# Patient Record
Sex: FEMALE | Race: OTHER | Employment: OTHER | URBAN - METROPOLITAN AREA
[De-identification: names, ages, dates, MRNs, and addresses within clinical notes are randomized per-mention and may not be internally consistent; named-entity substitution may affect disease eponyms.]

---

## 2017-08-17 ENCOUNTER — APPOINTMENT (OUTPATIENT)
Dept: GENERAL RADIOLOGY | Facility: HOSPITAL | Age: 62
End: 2017-08-17
Attending: EMERGENCY MEDICINE
Payer: COMMERCIAL

## 2017-08-17 ENCOUNTER — HOSPITAL ENCOUNTER (OUTPATIENT)
Facility: HOSPITAL | Age: 62
Setting detail: OBSERVATION
Discharge: HOME | End: 2017-08-18
Attending: EMERGENCY MEDICINE | Admitting: HOSPITALIST
Payer: COMMERCIAL

## 2017-08-17 ENCOUNTER — APPOINTMENT (OUTPATIENT)
Dept: CT IMAGING | Facility: HOSPITAL | Age: 62
End: 2017-08-17
Attending: EMERGENCY MEDICINE
Payer: COMMERCIAL

## 2017-08-17 DIAGNOSIS — R42 DIZZINESS: ICD-10-CM

## 2017-08-17 DIAGNOSIS — R20.0 LEFT SIDED NUMBNESS: Primary | ICD-10-CM

## 2017-08-17 DIAGNOSIS — I63.311 CEREBROVASCULAR ACCIDENT (CVA) DUE TO THROMBOSIS OF RIGHT MIDDLE CEREBRAL ARTERY (HCC): ICD-10-CM

## 2017-08-17 LAB
ALBUMIN SERPL BCP-MCNC: 3.5 G/DL (ref 3.5–4.8)
ALBUMIN/GLOB SERPL: 1.1 {RATIO} (ref 1–2)
ALP SERPL-CCNC: 69 U/L (ref 32–100)
ALT SERPL-CCNC: 19 U/L (ref 14–54)
ANION GAP SERPL CALC-SCNC: 6 MMOL/L (ref 0–18)
ANION GAP SERPL CALC-SCNC: 8 MMOL/L (ref 0–18)
AST SERPL-CCNC: 21 U/L (ref 15–41)
BASOPHILS # BLD: 0.1 K/UL (ref 0–0.2)
BASOPHILS NFR BLD: 1 %
BILIRUB SERPL-MCNC: 0.6 MG/DL (ref 0.3–1.2)
BILIRUB UR QL: NEGATIVE
BUN SERPL-MCNC: 13 MG/DL (ref 8–20)
BUN SERPL-MCNC: 14 MG/DL (ref 8–20)
BUN/CREAT SERPL: 23.7 (ref 10–20)
BUN/CREAT SERPL: 25 (ref 10–20)
CALCIUM SERPL-MCNC: 9.4 MG/DL (ref 8.5–10.5)
CALCIUM SERPL-MCNC: 9.5 MG/DL (ref 8.5–10.5)
CHLORIDE SERPL-SCNC: 103 MMOL/L (ref 95–110)
CHLORIDE SERPL-SCNC: 106 MMOL/L (ref 95–110)
CHOLEST SERPL-MCNC: 178 MG/DL (ref 110–200)
CLARITY UR: CLEAR
CO2 SERPL-SCNC: 28 MMOL/L (ref 22–32)
CO2 SERPL-SCNC: 28 MMOL/L (ref 22–32)
COLOR UR: YELLOW
CREAT SERPL-MCNC: 0.52 MG/DL (ref 0.5–1.5)
CREAT SERPL-MCNC: 0.59 MG/DL (ref 0.5–1.5)
EOSINOPHIL # BLD: 0.3 K/UL (ref 0–0.7)
EOSINOPHIL NFR BLD: 5 %
ERYTHROCYTE [DISTWIDTH] IN BLOOD BY AUTOMATED COUNT: 13.4 % (ref 11–15)
ERYTHROCYTE [SEDIMENTATION RATE] IN BLOOD: 27 MM/HR (ref 0–30)
GLOBULIN PLAS-MCNC: 3.2 G/DL (ref 2.5–3.7)
GLUCOSE BLDC GLUCOMTR-MCNC: 159 MG/DL (ref 70–99)
GLUCOSE SERPL-MCNC: 117 MG/DL (ref 70–99)
GLUCOSE SERPL-MCNC: 119 MG/DL (ref 70–99)
GLUCOSE UR-MCNC: NEGATIVE MG/DL
HCT VFR BLD AUTO: 39.7 % (ref 35–48)
HDLC SERPL-MCNC: 56 MG/DL
HGB BLD-MCNC: 13.3 G/DL (ref 12–16)
HGB UR QL STRIP.AUTO: NEGATIVE
INR BLD: 0.9 (ref 0.9–1.2)
KETONES UR-MCNC: NEGATIVE MG/DL
LDLC SERPL CALC-MCNC: 103 MG/DL (ref 0–99)
LEUKOCYTE ESTERASE UR QL STRIP.AUTO: NEGATIVE
LYMPHOCYTES # BLD: 3 K/UL (ref 1–4)
LYMPHOCYTES NFR BLD: 45 %
MCH RBC QN AUTO: 29.6 PG (ref 27–32)
MCHC RBC AUTO-ENTMCNC: 33.5 G/DL (ref 32–37)
MCV RBC AUTO: 88.3 FL (ref 80–100)
MONOCYTES # BLD: 0.7 K/UL (ref 0–1)
MONOCYTES NFR BLD: 10 %
NEUTROPHILS # BLD AUTO: 2.6 K/UL (ref 1.8–7.7)
NEUTROPHILS NFR BLD: 39 %
NITRITE UR QL STRIP.AUTO: NEGATIVE
NONHDLC SERPL-MCNC: 122 MG/DL
OSMOLALITY UR CALC.SUM OF ELEC: 290 MOSM/KG (ref 275–295)
OSMOLALITY UR CALC.SUM OF ELEC: 291 MOSM/KG (ref 275–295)
PH UR: 8 [PH] (ref 5–8)
PLATELET # BLD AUTO: 239 K/UL (ref 140–400)
PMV BLD AUTO: 9.1 FL (ref 7.4–10.3)
POTASSIUM SERPL-SCNC: 3.7 MMOL/L (ref 3.3–5.1)
POTASSIUM SERPL-SCNC: 3.7 MMOL/L (ref 3.3–5.1)
PROT SERPL-MCNC: 6.7 G/DL (ref 5.9–8.4)
PROT UR-MCNC: NEGATIVE MG/DL
PROTHROMBIN TIME: 11.7 SECONDS (ref 11.8–14.5)
RBC # BLD AUTO: 4.5 M/UL (ref 3.7–5.4)
RBC #/AREA URNS AUTO: 1 /HPF
SODIUM SERPL-SCNC: 139 MMOL/L (ref 136–144)
SODIUM SERPL-SCNC: 140 MMOL/L (ref 136–144)
SP GR UR STRIP: 1.01 (ref 1–1.03)
TRIGL SERPL-MCNC: 95 MG/DL (ref 1–149)
UROBILINOGEN UR STRIP-ACNC: <2
VIT C UR-MCNC: NEGATIVE MG/DL
WBC # BLD AUTO: 6.7 K/UL (ref 4–11)
WBC #/AREA URNS AUTO: <1 /HPF

## 2017-08-17 PROCEDURE — 70450 CT HEAD/BRAIN W/O DYE: CPT | Performed by: EMERGENCY MEDICINE

## 2017-08-17 PROCEDURE — 71010 XR CHEST AP PORTABLE  (CPT=71010): CPT | Performed by: EMERGENCY MEDICINE

## 2017-08-17 PROCEDURE — 99255 IP/OBS CONSLTJ NEW/EST HI 80: CPT | Performed by: OTHER

## 2017-08-17 PROCEDURE — 99220 INITIAL OBSERVATION CARE,LEVL III: CPT | Performed by: HOSPITALIST

## 2017-08-17 RX ORDER — POLYETHYLENE GLYCOL 3350 17 G/17G
17 POWDER, FOR SOLUTION ORAL DAILY PRN
Status: DISCONTINUED | OUTPATIENT
Start: 2017-08-17 | End: 2017-08-18

## 2017-08-17 RX ORDER — DEXTROSE MONOHYDRATE 25 G/50ML
50 INJECTION, SOLUTION INTRAVENOUS AS NEEDED
Status: DISCONTINUED | OUTPATIENT
Start: 2017-08-17 | End: 2017-08-18

## 2017-08-17 RX ORDER — ONDANSETRON 2 MG/ML
4 INJECTION INTRAMUSCULAR; INTRAVENOUS EVERY 6 HOURS PRN
Status: DISCONTINUED | OUTPATIENT
Start: 2017-08-17 | End: 2017-08-18

## 2017-08-17 RX ORDER — SODIUM PHOSPHATE, DIBASIC AND SODIUM PHOSPHATE, MONOBASIC 7; 19 G/133ML; G/133ML
1 ENEMA RECTAL ONCE AS NEEDED
Status: DISCONTINUED | OUTPATIENT
Start: 2017-08-17 | End: 2017-08-17

## 2017-08-17 RX ORDER — DOCUSATE SODIUM 100 MG/1
100 CAPSULE, LIQUID FILLED ORAL 2 TIMES DAILY
Status: DISCONTINUED | OUTPATIENT
Start: 2017-08-17 | End: 2017-08-18

## 2017-08-17 RX ORDER — ACETAMINOPHEN 325 MG/1
650 TABLET ORAL EVERY 6 HOURS PRN
Status: DISCONTINUED | OUTPATIENT
Start: 2017-08-17 | End: 2017-08-18

## 2017-08-17 RX ORDER — ASPIRIN 81 MG/1
324 TABLET, CHEWABLE ORAL ONCE
Status: COMPLETED | OUTPATIENT
Start: 2017-08-17 | End: 2017-08-17

## 2017-08-17 RX ORDER — ACETAMINOPHEN 325 MG/1
650 TABLET ORAL EVERY 4 HOURS PRN
Status: DISCONTINUED | OUTPATIENT
Start: 2017-08-17 | End: 2017-08-18

## 2017-08-17 RX ORDER — SODIUM PHOSPHATE, DIBASIC AND SODIUM PHOSPHATE, MONOBASIC 7; 19 G/133ML; G/133ML
1 ENEMA RECTAL ONCE AS NEEDED
Status: DISCONTINUED | OUTPATIENT
Start: 2017-08-17 | End: 2017-08-18

## 2017-08-17 RX ORDER — ASPIRIN 300 MG
300 SUPPOSITORY, RECTAL RECTAL DAILY
Status: DISCONTINUED | OUTPATIENT
Start: 2017-08-17 | End: 2017-08-18

## 2017-08-17 RX ORDER — LOSARTAN POTASSIUM 50 MG/1
100 TABLET ORAL DAILY
COMMUNITY
End: 2021-02-05

## 2017-08-17 RX ORDER — SODIUM CHLORIDE 0.9 % (FLUSH) 0.9 %
3 SYRINGE (ML) INJECTION AS NEEDED
Status: DISCONTINUED | OUTPATIENT
Start: 2017-08-17 | End: 2017-08-18

## 2017-08-17 RX ORDER — POLYETHYLENE GLYCOL 3350 17 G/17G
17 POWDER, FOR SOLUTION ORAL DAILY PRN
Status: DISCONTINUED | OUTPATIENT
Start: 2017-08-17 | End: 2017-08-17

## 2017-08-17 RX ORDER — METOCLOPRAMIDE HYDROCHLORIDE 5 MG/ML
10 INJECTION INTRAMUSCULAR; INTRAVENOUS EVERY 8 HOURS PRN
Status: DISCONTINUED | OUTPATIENT
Start: 2017-08-17 | End: 2017-08-18

## 2017-08-17 RX ORDER — SENNOSIDES 8.6 MG
17.2 TABLET ORAL NIGHTLY
Status: DISCONTINUED | OUTPATIENT
Start: 2017-08-17 | End: 2017-08-18

## 2017-08-17 RX ORDER — DOCUSATE SODIUM 100 MG/1
100 CAPSULE, LIQUID FILLED ORAL 2 TIMES DAILY
Status: DISCONTINUED | OUTPATIENT
Start: 2017-08-17 | End: 2017-08-17

## 2017-08-17 RX ORDER — BISACODYL 10 MG
10 SUPPOSITORY, RECTAL RECTAL
Status: DISCONTINUED | OUTPATIENT
Start: 2017-08-17 | End: 2017-08-18

## 2017-08-17 RX ORDER — ASPIRIN 325 MG
325 TABLET ORAL DAILY
Status: DISCONTINUED | OUTPATIENT
Start: 2017-08-17 | End: 2017-08-18

## 2017-08-17 RX ORDER — BISACODYL 10 MG
10 SUPPOSITORY, RECTAL RECTAL
Status: DISCONTINUED | OUTPATIENT
Start: 2017-08-17 | End: 2017-08-17

## 2017-08-17 RX ORDER — ASPIRIN 81 MG/1
81 TABLET, CHEWABLE ORAL DAILY
Status: DISCONTINUED | OUTPATIENT
Start: 2017-08-18 | End: 2017-08-17

## 2017-08-17 RX ORDER — ACETAMINOPHEN 650 MG/1
650 SUPPOSITORY RECTAL EVERY 4 HOURS PRN
Status: DISCONTINUED | OUTPATIENT
Start: 2017-08-17 | End: 2017-08-18

## 2017-08-17 NOTE — ED INITIAL ASSESSMENT (HPI)
Pt complains of pain on entire left side. Ambulated with steady gait.  +Dizziness for 1 week, worse today. Complains specifically of left neck and head pain.

## 2017-08-17 NOTE — ED PROVIDER NOTES
Patient Seen in: [facility] AND [facility] Emergency Department    History   Patient presents with:  Dizziness (neurologic)    Stated Complaint: entire left side pain, dizzy,Confusion    HPI    Patient presents the emergency department complaining of left-sided side pain, dizzy,Confusion  Other systems are as noted in HPI. Constitutional and vital signs reviewed. All other systems reviewed and negative except as noted above. PSFH elements reviewed from today and agreed except as otherwise stated in HPI. Notable for the following:     Glucose 117 (*)     BUN/CREA Ratio 25.0 (*)     All other components within normal limits   LIPID PANEL - Abnormal; Notable for the following:     LDL Cholesterol 103 (*)     All other components within normal limits   PROTHR DIFFERENTIAL WITH PLATELET    Narrative: The following orders were created for panel order CBC WITH DIFFERENTIAL WITH PLATELET.   Procedure                               Abnormality         Status                     --------- 1. Nonspecific cerebral white matter signal abnormality most likely related to chronic microvascular ischemic changes. No restricted diffusion or abnormal enhancement. 2. No intracranial mass lesion mass effect or midline shift.            Radiology exams

## 2017-08-17 NOTE — CONSULTS
History, examination aided by translation daughter in the emergency room. 1 week history of left face left arm left leg numbness paresthesias weakness, right temporal headaches, low back pain. No radicular features.   Impression– subcortical stroke, rule

## 2017-08-17 NOTE — H&P
[facility]    PATIENT'S NAME: Yajaira Kwong PHYSICIAN: Chu Sloan MD   PATIENT ACCOUNT#:   [id number]    LOCATION:  [place] [address]  MEDICAL RECORD #:   [id number]       YOB: 1955  ADMISSION DATE:       08/17/2 with moist mucous membranes. Normal hard and soft palate. NECK:  Trachea midline. Full range of motion. Supple. No thyromegaly or lymphadenopathy. LUNGS:  Clear to auscultation bilaterally. Normal respiratory effort. No intercostal retractions.

## 2017-08-18 ENCOUNTER — APPOINTMENT (OUTPATIENT)
Dept: MRI IMAGING | Facility: HOSPITAL | Age: 62
End: 2017-08-18
Attending: HOSPITALIST
Payer: COMMERCIAL

## 2017-08-18 ENCOUNTER — APPOINTMENT (OUTPATIENT)
Dept: CV DIAGNOSTICS | Facility: HOSPITAL | Age: 62
End: 2017-08-18
Attending: HOSPITALIST
Payer: COMMERCIAL

## 2017-08-18 VITALS
HEART RATE: 65 BPM | BODY MASS INDEX: 35.27 KG/M2 | DIASTOLIC BLOOD PRESSURE: 56 MMHG | RESPIRATION RATE: 18 BRPM | TEMPERATURE: 99 F | HEIGHT: 61 IN | SYSTOLIC BLOOD PRESSURE: 109 MMHG | WEIGHT: 186.81 LBS | OXYGEN SATURATION: 96 %

## 2017-08-18 LAB
ANION GAP SERPL CALC-SCNC: 6 MMOL/L (ref 0–18)
BASOPHILS # BLD: 0.1 K/UL (ref 0–0.2)
BASOPHILS NFR BLD: 1 %
BUN SERPL-MCNC: 20 MG/DL (ref 8–20)
BUN/CREAT SERPL: 29.9 (ref 10–20)
CALCIUM SERPL-MCNC: 9.3 MG/DL (ref 8.5–10.5)
CHLORIDE SERPL-SCNC: 107 MMOL/L (ref 95–110)
CHOLEST SERPL-MCNC: 180 MG/DL (ref 110–200)
CO2 SERPL-SCNC: 26 MMOL/L (ref 22–32)
CREAT SERPL-MCNC: 0.67 MG/DL (ref 0.5–1.5)
EOSINOPHIL # BLD: 0.4 K/UL (ref 0–0.7)
EOSINOPHIL NFR BLD: 4 %
ERYTHROCYTE [DISTWIDTH] IN BLOOD BY AUTOMATED COUNT: 13.7 % (ref 11–15)
GLUCOSE BLDC GLUCOMTR-MCNC: 113 MG/DL (ref 70–99)
GLUCOSE BLDC GLUCOMTR-MCNC: 90 MG/DL (ref 70–99)
GLUCOSE SERPL-MCNC: 109 MG/DL (ref 70–99)
HBA1C MFR BLD: 6.4 % (ref 4–6)
HCT VFR BLD AUTO: 39.1 % (ref 35–48)
HDLC SERPL-MCNC: 55 MG/DL
HGB BLD-MCNC: 12.9 G/DL (ref 12–16)
LDLC SERPL CALC-MCNC: 99 MG/DL (ref 0–99)
LYMPHOCYTES # BLD: 3.8 K/UL (ref 1–4)
LYMPHOCYTES NFR BLD: 44 %
MCH RBC QN AUTO: 29.4 PG (ref 27–32)
MCHC RBC AUTO-ENTMCNC: 32.9 G/DL (ref 32–37)
MCV RBC AUTO: 89.3 FL (ref 80–100)
MONOCYTES # BLD: 0.7 K/UL (ref 0–1)
MONOCYTES NFR BLD: 8 %
NEUTROPHILS # BLD AUTO: 3.8 K/UL (ref 1.8–7.7)
NEUTROPHILS NFR BLD: 44 %
NONHDLC SERPL-MCNC: 125 MG/DL
OSMOLALITY UR CALC.SUM OF ELEC: 291 MOSM/KG (ref 275–295)
PLATELET # BLD AUTO: 243 K/UL (ref 140–400)
PMV BLD AUTO: 9.3 FL (ref 7.4–10.3)
POTASSIUM SERPL-SCNC: 4.1 MMOL/L (ref 3.3–5.1)
RBC # BLD AUTO: 4.38 M/UL (ref 3.7–5.4)
SODIUM SERPL-SCNC: 139 MMOL/L (ref 136–144)
TRIGL SERPL-MCNC: 130 MG/DL (ref 1–149)
WBC # BLD AUTO: 8.7 K/UL (ref 4–11)

## 2017-08-18 PROCEDURE — 93306 TTE W/DOPPLER COMPLETE: CPT | Performed by: HOSPITALIST

## 2017-08-18 PROCEDURE — 70544 MR ANGIOGRAPHY HEAD W/O DYE: CPT | Performed by: HOSPITALIST

## 2017-08-18 PROCEDURE — 99233 SBSQ HOSP IP/OBS HIGH 50: CPT | Performed by: OTHER

## 2017-08-18 PROCEDURE — 99217 OBSERVATION CARE DISCHARGE: CPT | Performed by: HOSPITALIST

## 2017-08-18 PROCEDURE — 70553 MRI BRAIN STEM W/O & W/DYE: CPT | Performed by: HOSPITALIST

## 2017-08-18 PROCEDURE — 70549 MR ANGIOGRAPH NECK W/O&W/DYE: CPT | Performed by: HOSPITALIST

## 2017-08-18 RX ORDER — HYDROCHLOROTHIAZIDE 12.5 MG/1
12.5 TABLET ORAL DAILY
COMMUNITY
End: 2018-03-08

## 2017-08-18 RX ORDER — ASPIRIN 325 MG
325 TABLET ORAL DAILY
Qty: 30 TABLET | Refills: 0 | Status: SHIPPED | COMMUNITY
Start: 2017-08-18 | End: 2021-02-05

## 2017-08-18 RX ORDER — ATORVASTATIN CALCIUM 10 MG/1
10 TABLET, FILM COATED ORAL EVERY 24 HOURS
Status: DISCONTINUED | OUTPATIENT
Start: 2017-08-18 | End: 2017-08-18

## 2017-08-18 RX ORDER — OMEPRAZOLE 20 MG/1
20 CAPSULE, DELAYED RELEASE ORAL
COMMUNITY
End: 2020-07-08

## 2017-08-18 RX ORDER — ATORVASTATIN CALCIUM 10 MG/1
10 TABLET, FILM COATED ORAL EVERY 24 HOURS
Qty: 30 TABLET | Refills: 0 | Status: SHIPPED | OUTPATIENT
Start: 2017-08-18 | End: 2021-10-29

## 2017-08-18 NOTE — PROGRESS NOTES
[facility]    Progress Note    Mariah Wilson Patient Status:  Inpatient    1955 MRN [id number]   Location [facility] 3W/SW Attending Leopoldo Bruns, MD   Hosp Day # 1 PCP Priscila Jorge MD       Subjective:   Epifanio Diallo packet 17 g 17 g Oral Daily PRN   magnesium hydroxide (MILK OF MAGNESIA) 400 MG/5ML suspension 30 mL 30 mL Oral Daily PRN   bisacodyl (DULCOLAX) rectal suppository 10 mg 10 mg Rectal Daily PRN   FLEET ENEMA (FLEET) 7-19 GM/118ML enema 133 mL 1 enema Rectal Ct Brain Or Head (57516)    Result Date: 8/17/2017  CONCLUSION:  1. No acute intracranial process. 2. There are mild microvascular white matter ischemic changes, likely related to long-standing hypertension and/or diabetes.  3. Atherosclerosis in the

## 2017-08-18 NOTE — CONSULTS
[facility]    PATIENT'S NAME: Jonah Núñez   ATTENDING PHYSICIAN: Gema Davis MD   CONSULTING PHYSICIAN: Brit Aparicio MD   PATIENT ACCOUNT#:   [id number]    LOCATION:  [address] [place] [address] #:   [id number]       DATE OF BIRTH: leg.  Deep tendon reflexes are symmetric without Babinski signs. No carotid bruits. There is a left upper extremity pronator drift. Slight decreased light touch on the left face, left arm, left leg. Temporal arteries are normal to palpation.     Raven Wilde

## 2017-08-18 NOTE — OCCUPATIONAL THERAPY NOTE
OCCUPATIONAL THERAPY EVALUATION - INPATIENT     Room Number: 325/325-A  Evaluation Date: 8/18/2017  Type of Evaluation: Initial  Presenting Problem:  (L face, arm, leg numbness & paresthesia x5 days)    Physician Order: IP Consult to Occupational Therapy Patient is a 59year old female admitted 8/17/2017 for L sided numbness and parasthesia x5 days. Pt reporting pain in L hand up to L elbow area, stating \"cramp. \" Pt has numbness and tingling throughout LUE.  Pt noted with weakness in LUE 3-/5, slightly le Equipment: Standard height toilet          Occupation/Status:  (Works in a [employer] [employer]  Hand Dominance: Right  Drives: Yes  Patient Regularly Uses: Glasses    Stairs in Home: 15 stairs to basement without rail  Use of Assistive Device(s): none    Prior Level 19  Approx Degree of Impairment: 42.8%  Standardized Score (AM-PAC Scale): 40.22  CMS Modifier (G-Code): CK    FUNCTIONAL TRANSFER ASSESSMENT  Supine to Sit : Not tested (Out of bed at this time)  Sit to Stand:  (CGA)    Toilet Transfer: CGA  Shower Transf

## 2017-08-18 NOTE — PHYSICAL THERAPY NOTE
PHYSICAL THERAPY EVALUATION - INPATIENT     Room Number: 325/325-A  Evaluation Date: 8/18/2017  Type of Evaluation: Initial  Physician Order: PT Eval and Treat    Presenting Problem: left sided numbness  Reason for Therapy: Mobility Dysfunction and Dis ischemic changes. No restricted diffusion or abnormal enhancement. 2. No intracranial mass lesion mass effect or midline shift. \"       DISCHARGE RECOMMENDATIONS  PT Discharge Recommendations: Sub-acute rehabilitation    PLAN  PT Treatment Plan: Bed mobili Standing: Fair      AM-PAC '6-Clicks' INPATIENT SHORT FORM - BASIC MOBILITY  How much difficulty does the patient currently have. ..  -   Turning over in bed (including adjusting bedclothes, sheets and blankets)?: A Little   -   Sitting down on and standing Patient to demonstrate independence with home activity/exercise instructions provided to patient in preparation for discharge   Goal #4   Current Status    Goal #5 .    Goal #5   Current Status    Goal #6    Goal #6  Current Status

## 2017-08-18 NOTE — DISCHARGE SUMMARY
[place] HOSPITAL[facility]  DISCHARGE SUMMARY     Carlos Meadows Patient Status:  Inpatient    1955 MRN [id number]   Location [facility] 3W/SW Attending Fannie Xie MD   Hosp Day # 1 PCP Delgado Florez MD     DATE OF ADMISSION: 2017  DA °F (37.1 °C)] 98.7 °F (37.1 °C)  Pulse:  [57-67] 65  Resp:  [16-18] 18  BP: ()/(38-58) 109/56  Gen: A+Ox3. No distress. HEENT: NCAT, neck supple, no carotid bruit. CV: RRR, S1S2, and intact distal pulses. No gallop, rub, murmur.   Pulm: [address] and [address]  Consultants     Provider Role Specialty    Torrance Schaumann, MD Consulting Physician  NEUROLOGY          FOLLOW UP:  Mary Mir MD  [address] [facility] [id number] [phone]    In 2 weeks  Post Discharge [facility] · Number of Possible Diagnoses and/or Management Options: moderate  · Amount and/or Complexity of Data to Be Reviewed: moderate  · Risk of Significant Complications, Morbidity, and/or Mortality: moderate    Overall Risk:   moderate    Patient seen within

## 2017-08-18 NOTE — PLAN OF CARE
Ok to discharge patient home. Discharge instructions explained to patient and daughter. Tele and IV discontinued. Prescription given. Patient sent home with family.

## 2017-08-18 NOTE — DISCHARGE PLANNING
8/18/17  Discharge planning   Met with pt, and dtrs at bedside. Pt is Salvadorean speaking, lives with dtr and was independent and employed prior to admission. Pt declined HHC, plans to return home at discharge and will not be homebound.    Dtrs available to

## 2017-08-18 NOTE — PAYOR COMM NOTE
REF# [id number]    [facility] #[id number]   Admission Info: Inpatient (Adm: 08/17/17)   Hospital Account: [de-identified]   Description: 59year old F Primary Service: Cardiac Telemetry Unit Info: [address] [place] [address] 3-W/SW   History and Physical     H&P signed by [employer] SOCIAL HISTORY:  She smokes a few cigarettes a day. No alcohol use. She lives with her family.   She is usually independent for basic activities of daily living.     REVIEW OF SYSTEMS:  Left facial and left upper and lower extremity paresthesia for the la d:                08/17/2017 17:45:38  t:                08/17/2017 18:00:23  [facility]             [id number]  FB/         Signed by Caren Pandey MD on 8/17/2017  6:25 PM      Consults signed by Taylor Oneal MD at 8/18/2017 12:56 PM     Author: REVIEW OF SYSTEMS:  No prior history of TIA, CVA, loss of consciousness, seizure, coronary artery disease, DVT, PE.   At the present time, she denies visual symptoms, chest pain, shortness of breath, bowel and bladder symptoms, peripheral vascular symptoms, PATIENT'S NAME: Bismark Madrigal PHYSICIAN: Delores Noe MD   CONSULTING PHYSICIAN: Jun Li MD   PATIENT ACCOUNT#:   [id number]    LOCATION:  [address] [place] [address] #:   [id number]       YOB: 1955  [facility] NEUROLOGIC:  Cranial nerves III through VI and IX through XII are normal.  There is subtle left central facial weakness. There is slight weakness in the left arm and left leg. Deep tendon reflexes are symmetric without Babinski signs. No carotid bruits. Carlos Meadows is a(n) 59year old female headache is resolved. She is only complaining of left hand to left shoulder pain. She denies cervical spine pain. She has left lower extremity muscle cramps.   She denies numbness paresthesias in the face arm or FLEET ENEMA (FLEET) 7-19 GM/118ML enema 133 mL 1 enema Rectal Once PRN   ondansetron HCl (ZOFRAN) injection 4 mg 4 mg Intravenous Q6H PRN   Or         Metoclopramide HCl (REGLAN) injection 10 mg 10 mg Intravenous Q8H PRN   aspirin 300 MG rectal suppository CONCLUSION:            1. No acute intracranial process. 2. There are mild microvascular white matter ischemic changes, likely related to long-standing hypertension and/or diabetes.  3. Atherosclerosis in the anterior and posterior circulations.           M

## 2017-08-19 ENCOUNTER — TELEPHONE (OUTPATIENT)
Dept: CARDIOLOGY UNIT | Facility: HOSPITAL | Age: 62
End: 2017-08-19

## 2017-08-22 NOTE — PAYOR COMM NOTE
--------------  DISCHARGE REVIEW    Payor: [employer]  Subscriber #:  [id number]  Authorization Number: [id number]    Admit date: N/A  Admit time:  N/A  Discharge Date: 8/18/2017  6:18 PM     Admitting Physician: Kedar Ovalle MD  Attending did not show an acute stroke. Neurology was on consult. They felt this might of been a TIA. No embolic source seen on imaging of the neck or heart.   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    Refills:  0     MetFORMIN HCl 500 MG Tabs  Commonly known as:  GLUCOPHAGE      Take 500 mg by mouth daily with breakfast.   Refills:  0     omeprazole 20 MG Cpdr  Commonly known as: living. ? Referrals as listed bleow in orders Assisted in scheduling required follow-up with community providers and services. Medication Reconciliation:  I am aware of an inpatient discharge within the last 30 days.   The discharge medication list has

## 2018-03-08 RX ORDER — DICLOFENAC 35 MG/1
CAPSULE ORAL
COMMUNITY
End: 2019-07-22 | Stop reason: ALTCHOICE

## 2018-03-12 NOTE — H&P
[facility] Patient Status:  Hospital Outpatient Surgery    1955 MRN [id number]   Location [place] [address] Attending Ambreen Rogers, *   Hosp Day # 0 PCP Rodrigo Sliva walks with a markedly antalgic gait on the right side. She's tender to palpation about the right knee. There's no warmth or erythema. She has full extension. She resists flexion beyond about 30 degrees. There is a patellofemoral crepitus noted.       Result

## 2018-03-13 ENCOUNTER — HOSPITAL ENCOUNTER (OUTPATIENT)
Facility: HOSPITAL | Age: 63
Setting detail: HOSPITAL OUTPATIENT SURGERY
Discharge: HOME | End: 2018-03-13
Attending: ORTHOPAEDIC SURGERY | Admitting: ORTHOPAEDIC SURGERY
Payer: COMMERCIAL

## 2018-03-13 ENCOUNTER — SURGERY (OUTPATIENT)
Age: 63
End: 2018-03-13

## 2018-03-13 ENCOUNTER — ANESTHESIA (OUTPATIENT)
Dept: SURGERY | Facility: HOSPITAL | Age: 63
End: 2018-03-13
Payer: COMMERCIAL

## 2018-03-13 ENCOUNTER — ANESTHESIA EVENT (OUTPATIENT)
Dept: SURGERY | Facility: HOSPITAL | Age: 63
End: 2018-03-13
Payer: COMMERCIAL

## 2018-03-13 VITALS
WEIGHT: 184 LBS | RESPIRATION RATE: 16 BRPM | TEMPERATURE: 99 F | BODY MASS INDEX: 37.09 KG/M2 | HEIGHT: 59 IN | OXYGEN SATURATION: 97 % | HEART RATE: 64 BPM | SYSTOLIC BLOOD PRESSURE: 136 MMHG | DIASTOLIC BLOOD PRESSURE: 51 MMHG

## 2018-03-13 DIAGNOSIS — S83.241A ACUTE MEDIAL MENISCUS TEAR OF RIGHT KNEE: Primary | ICD-10-CM

## 2018-03-13 DIAGNOSIS — M23.8X1 CHONDRAL DEFECT OF CONDYLE OF RIGHT FEMUR: ICD-10-CM

## 2018-03-13 LAB
GLUCOSE BLDC GLUCOMTR-MCNC: 133 MG/DL (ref 70–99)
GLUCOSE BLDC GLUCOMTR-MCNC: 134 MG/DL (ref 70–99)

## 2018-03-13 PROCEDURE — 82962 GLUCOSE BLOOD TEST: CPT

## 2018-03-13 PROCEDURE — 0SBC4ZZ EXCISION OF RIGHT KNEE JOINT, PERCUTANEOUS ENDOSCOPIC APPROACH: ICD-10-PCS | Performed by: ORTHOPAEDIC SURGERY

## 2018-03-13 RX ORDER — MORPHINE SULFATE 2 MG/ML
2 INJECTION, SOLUTION INTRAMUSCULAR; INTRAVENOUS EVERY 10 MIN PRN
Status: DISCONTINUED | OUTPATIENT
Start: 2018-03-13 | End: 2018-03-13

## 2018-03-13 RX ORDER — HYDROCODONE BITARTRATE AND ACETAMINOPHEN 5; 325 MG/1; MG/1
2 TABLET ORAL AS NEEDED
Status: DISCONTINUED | OUTPATIENT
Start: 2018-03-13 | End: 2018-03-13

## 2018-03-13 RX ORDER — MIDAZOLAM HYDROCHLORIDE 1 MG/ML
INJECTION INTRAMUSCULAR; INTRAVENOUS AS NEEDED
Status: DISCONTINUED | OUTPATIENT
Start: 2018-03-13 | End: 2018-03-13 | Stop reason: SURG

## 2018-03-13 RX ORDER — FAMOTIDINE 20 MG/1
20 TABLET ORAL ONCE
Status: COMPLETED | OUTPATIENT
Start: 2018-03-13 | End: 2018-03-13

## 2018-03-13 RX ORDER — SODIUM CHLORIDE, SODIUM LACTATE, POTASSIUM CHLORIDE, CALCIUM CHLORIDE 600; 310; 30; 20 MG/100ML; MG/100ML; MG/100ML; MG/100ML
INJECTION, SOLUTION INTRAVENOUS CONTINUOUS
Status: DISCONTINUED | OUTPATIENT
Start: 2018-03-13 | End: 2018-03-13

## 2018-03-13 RX ORDER — LIDOCAINE HYDROCHLORIDE 10 MG/ML
INJECTION, SOLUTION EPIDURAL; INFILTRATION; INTRACAUDAL; PERINEURAL AS NEEDED
Status: DISCONTINUED | OUTPATIENT
Start: 2018-03-13 | End: 2018-03-13 | Stop reason: SURG

## 2018-03-13 RX ORDER — HYDROCODONE BITARTRATE AND ACETAMINOPHEN 5; 325 MG/1; MG/1
1 TABLET ORAL AS NEEDED
Status: DISCONTINUED | OUTPATIENT
Start: 2018-03-13 | End: 2018-03-13

## 2018-03-13 RX ORDER — CEFAZOLIN SODIUM/WATER 2 G/20 ML
2 SYRINGE (ML) INTRAVENOUS ONCE
Status: COMPLETED | OUTPATIENT
Start: 2018-03-13 | End: 2018-03-13

## 2018-03-13 RX ORDER — TRAMADOL HYDROCHLORIDE 50 MG/1
50 TABLET ORAL EVERY 6 HOURS PRN
Status: DISCONTINUED | OUTPATIENT
Start: 2018-03-13 | End: 2018-03-13

## 2018-03-13 RX ORDER — TRAMADOL HYDROCHLORIDE 50 MG/1
50 TABLET ORAL EVERY 6 HOURS PRN
Qty: 40 TABLET | Refills: 0 | Status: SHIPPED | OUTPATIENT
Start: 2018-03-13 | End: 2019-07-22 | Stop reason: ALTCHOICE

## 2018-03-13 RX ORDER — ONDANSETRON 2 MG/ML
INJECTION INTRAMUSCULAR; INTRAVENOUS AS NEEDED
Status: DISCONTINUED | OUTPATIENT
Start: 2018-03-13 | End: 2018-03-13 | Stop reason: SURG

## 2018-03-13 RX ORDER — HALOPERIDOL 5 MG/ML
0.25 INJECTION INTRAMUSCULAR ONCE AS NEEDED
Status: DISCONTINUED | OUTPATIENT
Start: 2018-03-13 | End: 2018-03-13

## 2018-03-13 RX ORDER — MORPHINE SULFATE 4 MG/ML
4 INJECTION, SOLUTION INTRAMUSCULAR; INTRAVENOUS EVERY 10 MIN PRN
Status: DISCONTINUED | OUTPATIENT
Start: 2018-03-13 | End: 2018-03-13

## 2018-03-13 RX ORDER — NALOXONE HYDROCHLORIDE 0.4 MG/ML
80 INJECTION, SOLUTION INTRAMUSCULAR; INTRAVENOUS; SUBCUTANEOUS AS NEEDED
Status: DISCONTINUED | OUTPATIENT
Start: 2018-03-13 | End: 2018-03-13

## 2018-03-13 RX ORDER — DEXTROSE MONOHYDRATE 25 G/50ML
50 INJECTION, SOLUTION INTRAVENOUS
Status: DISCONTINUED | OUTPATIENT
Start: 2018-03-13 | End: 2018-03-13

## 2018-03-13 RX ORDER — METOCLOPRAMIDE 10 MG/1
10 TABLET ORAL ONCE
Status: COMPLETED | OUTPATIENT
Start: 2018-03-13 | End: 2018-03-13

## 2018-03-13 RX ORDER — MORPHINE SULFATE 10 MG/ML
6 INJECTION, SOLUTION INTRAMUSCULAR; INTRAVENOUS EVERY 10 MIN PRN
Status: DISCONTINUED | OUTPATIENT
Start: 2018-03-13 | End: 2018-03-13

## 2018-03-13 RX ORDER — ACETAMINOPHEN 500 MG
1000 TABLET ORAL ONCE
Status: COMPLETED | OUTPATIENT
Start: 2018-03-13 | End: 2018-03-13

## 2018-03-13 RX ORDER — ONDANSETRON 2 MG/ML
4 INJECTION INTRAMUSCULAR; INTRAVENOUS ONCE AS NEEDED
Status: COMPLETED | OUTPATIENT
Start: 2018-03-13 | End: 2018-03-13

## 2018-03-13 RX ADMIN — SODIUM CHLORIDE, SODIUM LACTATE, POTASSIUM CHLORIDE, CALCIUM CHLORIDE: 600; 310; 30; 20 INJECTION, SOLUTION INTRAVENOUS at 07:33:00

## 2018-03-13 RX ADMIN — MIDAZOLAM HYDROCHLORIDE 2 MG: 1 INJECTION INTRAMUSCULAR; INTRAVENOUS at 07:33:00

## 2018-03-13 RX ADMIN — ONDANSETRON 4 MG: 2 INJECTION INTRAMUSCULAR; INTRAVENOUS at 08:15:00

## 2018-03-13 RX ADMIN — SODIUM CHLORIDE, SODIUM LACTATE, POTASSIUM CHLORIDE, CALCIUM CHLORIDE: 600; 310; 30; 20 INJECTION, SOLUTION INTRAVENOUS at 08:29:00

## 2018-03-13 RX ADMIN — CEFAZOLIN SODIUM/WATER 2 G: 2 G/20 ML SYRINGE (ML) INTRAVENOUS at 07:44:00

## 2018-03-13 RX ADMIN — LIDOCAINE HYDROCHLORIDE 50 MG: 10 INJECTION, SOLUTION EPIDURAL; INFILTRATION; INTRACAUDAL; PERINEURAL at 07:40:00

## 2018-03-13 NOTE — BRIEF OP NOTE
Pre-Operative Diagnosis: medial meniscus tear right knee     Post-Operative Diagnosis: medial meniscus tear right knee, chondral fx lateral femoral condyle     Procedure Performed:   Procedure(s):  arthroscopy right knee; partial medial meniscectomy; ch

## 2018-03-13 NOTE — OPERATIVE REPORT
[facility]    PATIENT'S NAME: Cary Grossman   ATTENDING PHYSICIAN: Robles Carrera MD   OPERATING PHYSICIAN: Robles Carrera MD   PATIENT ACCOUNT#:   [id number]    LOCATION:  [facility] [address] PACU [address] [place] [address]  MEDICAL RECORD #:   [id number] demonstrated moderate degenerative change in a 1.5 cm area of the distal aspect of the medial femoral condyle with no exposed bone. There was a flap tear of the posterior horn of the medial meniscus just lateral to the root.   The flap was debrided with ba

## 2018-03-13 NOTE — ANESTHESIA PREPROCEDURE EVALUATION
Anesthesia PreOp Note    HPI:     Belem Riserae is a 57year old female who presents for preoperative consultation requested by: Saranya Montalvo MD    Date of Surgery: 3/13/2018    Procedure(s):  KNEE ARTHROSCOPY  Indication: medial meniscus tear Diabetes Type 2   • Other [OTHER] Mother    • Hypertension Other      Family h/o   • Other [OTHER] Other      Family h   • Other [OTHER] Sister      Gallbladder disease       Social History  Social History   Marital status:    Spouse name: Jacklyn Mckoy answered to the best of my ability. The patient desires the anesthetic management as planned.   Deyvi Agarwal  3/13/2018 6:53 AM

## 2018-03-13 NOTE — INTERVAL H&P NOTE
Pre-op Diagnosis: medial meniscus tear right knee    The above referenced H&P was reviewed by Lennox Nissen, MD on 3/13/2018, the patient was examined and no significant changes have occurred in the patient's condition since the H&P was performed.

## 2018-03-13 NOTE — ANESTHESIA POSTPROCEDURE EVALUATION
Patient: Lori Villaseñor    Procedure Summary     Date:  03/13/18 Room / Location:  [address] [place] [address] MAIN OR 11 / [address] [place] [address] MAIN OR    Anesthesia Start:  6286 Anesthesia Stop:  0830    Procedure:  KNEE ARTHROSCOPY (Right Knee) Diagnosis:  (medial meniscus tear right knee)

## 2019-07-22 PROBLEM — E11.9 TYPE 2 DIABETES MELLITUS WITHOUT COMPLICATION, WITHOUT LONG-TERM CURRENT USE OF INSULIN (HCC): Status: ACTIVE | Noted: 2019-07-22

## 2019-07-22 NOTE — PATIENT INSTRUCTIONS
Ataque de pánico  Un ataque de pánico es trena reacción de miedo intenso que surge sin motivo aparente. A menudo, la persona siente miedo de que está a punto de ocurrirle algo terrible o de que va a morir.  El ataque puede durar desde unos minutos a varias ? Cambios importantes en la elba personal, tanto buenos (por ejemplo el nacimiento de un bebé o un ascenso en el [place]) sigifredo malos (pérdida de [place] o pérdida de un ser [place]). ?  Sentir que tiene demasiadas responsabilidades y no puede ocuparse de t · Tiene pensamientos suicidas, un plan suicida y los medios para llevar a cabo dicho plan  · Tiene pensamientos graves acerca de atacar a otra persona  · Tiene dificultades para respirar  · Se siente muy confundido  · Se siente adormecido o tiene dificulta

## 2019-07-23 NOTE — PROGRESS NOTES
CC:  Anxiety (NP presents to clinic for anxiety attacks x 1 wk. )      Hx of CC:  GETTING PANIC ATTACKS X A WEEK--ESPECIALLY WITH DRIVING. NO SPECIFIC TRIGGERS.   MOOD \"SO-SO\"  ACCOMPANIED BY DAUGHTER WHO'S AN RN    PMHX:  DIABETES, OLD CVA  SOCIAL:  WID

## 2020-05-05 ENCOUNTER — PATIENT OUTREACH (OUTPATIENT)
Dept: INTERNAL MEDICINE CLINIC | Facility: CLINIC | Age: 65
End: 2020-05-05

## 2020-05-05 NOTE — PROGRESS NOTES
Mailed outreach letters to patient to remind her that she is due for DM testing and colorectal testing.   FIT test mailed out

## 2020-06-20 ENCOUNTER — HOSPITAL ENCOUNTER (OUTPATIENT)
Age: 65
Discharge: HOME | End: 2020-06-20
Attending: EMERGENCY MEDICINE
Payer: COMMERCIAL

## 2020-06-20 VITALS
RESPIRATION RATE: 16 BRPM | TEMPERATURE: 97 F | HEART RATE: 70 BPM | SYSTOLIC BLOOD PRESSURE: 144 MMHG | DIASTOLIC BLOOD PRESSURE: 68 MMHG | OXYGEN SATURATION: 98 %

## 2020-06-20 DIAGNOSIS — E11.42 DIABETIC POLYNEUROPATHY ASSOCIATED WITH TYPE 2 DIABETES MELLITUS (HCC): Primary | ICD-10-CM

## 2020-06-20 PROCEDURE — 99213 OFFICE O/P EST LOW 20 MIN: CPT

## 2020-06-20 PROCEDURE — 99214 OFFICE O/P EST MOD 30 MIN: CPT

## 2020-06-20 RX ORDER — GABAPENTIN 300 MG/1
300 CAPSULE ORAL 3 TIMES DAILY
Qty: 30 CAPSULE | Refills: 0 | Status: SHIPPED | OUTPATIENT
Start: 2020-06-20 | End: 2021-02-05

## 2020-06-20 NOTE — ED PROVIDER NOTES
Patient Seen in: [facility] In [address] [place] [address]    History   Patient presents with:  Swelling Edema    Stated Complaint: FOOT SWELLING/PAIN     HPI    Patient complains of bilateral foot pain which has numbness and tingling for the past 1 w Hypertension Other         Family h/o   • Other (Other) Other         Family h   • Other (Other) Sister         Gallbladder disease     Family history reviewed with patient/caregiver and is not pertinent to presenting problem.    Social History    Tobacco U diagnosis)    Disposition:  Discharge  6/20/2020  1:08 pm    Follow-up:  Angela Valenzuela MD  [address] [place] [address]  [phone]    Schedule an appointment as soon as possible for a visit in 1 week  If symptoms worsen          Medications

## 2020-07-03 ENCOUNTER — HOSPITAL ENCOUNTER (OUTPATIENT)
Dept: MAMMOGRAPHY | Facility: HOSPITAL | Age: 65
Discharge: HOME | End: 2020-07-03
Attending: INTERNAL MEDICINE
Payer: COMMERCIAL

## 2020-07-03 DIAGNOSIS — Z12.31 ENCOUNTER FOR SCREENING MAMMOGRAM FOR MALIGNANT NEOPLASM OF BREAST: ICD-10-CM

## 2020-07-03 PROCEDURE — 77067 SCR MAMMO BI INCL CAD: CPT | Performed by: INTERNAL MEDICINE

## 2020-07-03 PROCEDURE — 77063 BREAST TOMOSYNTHESIS BI: CPT | Performed by: INTERNAL MEDICINE

## 2020-07-07 NOTE — H&P
[address] [facility] [address] Gastroenterology                                                                                                  Clinic History and Physical     Pa None    Surgical Hx:  - cholecystectomy  - see complete list below   - No known issues with sedation.  - No known history of sleep apnea.     Pertinent Family Hx:  - No family hx of esophageal or gastric cancer  - No family hx of CRC   - No family history o large distal common bile duct filling defect,   with a large round meniscus seen in the distal common bile duct on initial cholangiograms, and multiple smaller filling defects including a second  large mobile defect up in the mid and proximal common hepati through the duodenal wall. A 12-15 mm esophageal dilation balloon was therefore advanced down the working channel of the duodenoscope and used to cannulate the common bile duct by free cannulation.    The balloon was positioned so about 4-5 cm of its dista common hepatic duct, after previous sphincterotomy and stone extraction procedure in 2006. Stones and debris cleared as above using balloon and basket sweeps and ultimately enlargement of the sphincterotomy by balloon dilation as above.      RECOMMENDATION Medication Sig Dispense Refill   • atorvastatin 10 MG Oral Tab      • ibuprofen 600 MG Oral Tab      • losartan 100 MG Oral Tab      • Losartan Potassium-HCTZ 100-12.5 MG Oral Tab      • naproxen 125 MG/5ML Oral Suspension Take by mouth 2 (two) times ajit jaundice   ALLERGIC/IMMUNOLOGIC:  negative for hay fever  ENDOCRINE:  negative for cold intolerance and heat intolerance  MUSCULOSKELETAL:  negative for joint effusion/severe erythema +joint pains   BEHAVIOR/PSYCH:  negative for psychotic behavior    PHYSI high dose PPI as Prilosec helping somewhat and evaluation with EGD and ultrasound of the abdomen.     #constipation  #no prior colonoscopy  #screening colonoscopy    Conservative management for constipation today including increase activity, water and Glenny endoscopy/enteroscopy with possible intervention [i.e. polypectomy, ablation, stent placement, etc.] as indicated.     Colonoscopy consent: I have discussed the risks, benefits, and alternatives to colonoscopy with the patient [who demonstrated understandin

## 2020-07-08 ENCOUNTER — OFFICE VISIT (OUTPATIENT)
Dept: GASTROENTEROLOGY | Facility: CLINIC | Age: 65
End: 2020-07-08
Payer: COMMERCIAL

## 2020-07-08 ENCOUNTER — TELEPHONE (OUTPATIENT)
Dept: GASTROENTEROLOGY | Facility: CLINIC | Age: 65
End: 2020-07-08

## 2020-07-08 VITALS
DIASTOLIC BLOOD PRESSURE: 76 MMHG | HEIGHT: 60 IN | WEIGHT: 184 LBS | HEART RATE: 60 BPM | SYSTOLIC BLOOD PRESSURE: 120 MMHG | BODY MASS INDEX: 36.12 KG/M2

## 2020-07-08 DIAGNOSIS — R10.11 RUQ ABDOMINAL PAIN: ICD-10-CM

## 2020-07-08 DIAGNOSIS — K59.00 CONSTIPATION, UNSPECIFIED CONSTIPATION TYPE: ICD-10-CM

## 2020-07-08 DIAGNOSIS — Z12.11 ENCOUNTER FOR SCREENING COLONOSCOPY: ICD-10-CM

## 2020-07-08 DIAGNOSIS — R12 HEARTBURN: Primary | ICD-10-CM

## 2020-07-08 DIAGNOSIS — Z12.11 SCREEN FOR COLON CANCER: ICD-10-CM

## 2020-07-08 DIAGNOSIS — R10.13 EPIGASTRIC ABDOMINAL PAIN: ICD-10-CM

## 2020-07-08 PROCEDURE — S0285 CNSLT BEFORE SCREEN COLONOSC: HCPCS | Performed by: PHYSICIAN ASSISTANT

## 2020-07-08 RX ORDER — OMEPRAZOLE 40 MG/1
40 CAPSULE, DELAYED RELEASE ORAL DAILY
Qty: 90 CAPSULE | Refills: 0 | Status: SHIPPED | OUTPATIENT
Start: 2020-07-08 | End: 2022-01-18

## 2020-07-08 RX ORDER — NAPROXEN 500 MG/1
TABLET ORAL 2 TIMES DAILY
COMMUNITY
Start: 2020-06-30 | End: 2021-02-05

## 2020-07-08 RX ORDER — LOSARTAN POTASSIUM AND HYDROCHLOROTHIAZIDE 12.5; 1 MG/1; MG/1
TABLET ORAL
COMMUNITY
Start: 2020-04-16 | End: 2021-12-15

## 2020-07-08 RX ORDER — METFORMIN HYDROCHLORIDE 500 MG/1
TABLET, EXTENDED RELEASE ORAL
COMMUNITY
Start: 2020-01-22

## 2020-07-08 RX ORDER — IBUPROFEN 600 MG/1
TABLET ORAL
COMMUNITY
Start: 2020-04-21 | End: 2021-02-05

## 2020-07-08 RX ORDER — ATORVASTATIN CALCIUM 10 MG/1
TABLET, FILM COATED ORAL
COMMUNITY
Start: 2020-05-19 | End: 2021-02-05

## 2020-07-08 RX ORDER — POLYETHYLENE GLYCOL 3350, SODIUM CHLORIDE, SODIUM BICARBONATE, POTASSIUM CHLORIDE 420; 11.2; 5.72; 1.48 G/4L; G/4L; G/4L; G/4L
POWDER, FOR SOLUTION ORAL
Qty: 1 BOTTLE | Refills: 0 | Status: SHIPPED | OUTPATIENT
Start: 2020-07-08 | End: 2021-02-05

## 2020-07-08 RX ORDER — NAPROXEN 125 MG/5ML
SUSPENSION ORAL 2 TIMES DAILY
COMMUNITY
End: 2021-02-05

## 2020-07-08 RX ORDER — LOSARTAN POTASSIUM 100 MG/1
TABLET ORAL
COMMUNITY
Start: 2020-01-23 | End: 2021-02-05

## 2020-07-08 NOTE — PATIENT INSTRUCTIONS
Recommend:  -Schedule colonoscopy and upper endoscopy w/ MAC sedation (Dx: screening colonoscopy, constipation AND heartburn, epigastric abd pain) with Dr. Austin Gutierres @ [facility] or [facility]  -Prep: Trilyte split dose preparation   -Medication Changes:  + HOLD Metformin it leaves marks/indentations in the skin, it is likely too tight)  - NSAIDs (non-steroidal anti-inflammatory medications) such as Ibuprofen, Motrin, Aleve, Aspirin, Naproxen, Meloxicam, et Deb Pavy   - peppermint, chocolate     Try:  - Raising the head of th

## 2020-07-08 NOTE — TELEPHONE ENCOUNTER
Scheduled for:  COLON [address]366; EGD [address]  Provider Name:  Dr Lolis Soni  Date:  8/13/2020  Location:  [facility]  Sedation:  MAC  Time:  9:15AM (pt is aware that [facility] will call the day before to confirm arrival time)    Prep:  Trilyte  Meds/Allergies Reconciled?:  Physi

## 2020-07-27 ENCOUNTER — HOSPITAL ENCOUNTER (OUTPATIENT)
Dept: ULTRASOUND IMAGING | Facility: HOSPITAL | Age: 65
Discharge: HOME | End: 2020-07-27
Payer: COMMERCIAL

## 2020-07-27 ENCOUNTER — LAB ENCOUNTER (OUTPATIENT)
Dept: LAB | Facility: HOSPITAL | Age: 65
End: 2020-07-27
Payer: COMMERCIAL

## 2020-07-27 DIAGNOSIS — E11.9 DM (DIABETES MELLITUS) (HCC): ICD-10-CM

## 2020-07-27 DIAGNOSIS — R10.11 RUQ ABDOMINAL PAIN: ICD-10-CM

## 2020-07-27 DIAGNOSIS — M79.671 RIGHT FOOT PAIN: ICD-10-CM

## 2020-07-27 DIAGNOSIS — R10.13 EPIGASTRIC ABDOMINAL PAIN: ICD-10-CM

## 2020-07-27 DIAGNOSIS — R12 HEARTBURN: ICD-10-CM

## 2020-07-27 DIAGNOSIS — K59.00 CONSTIPATION, UNSPECIFIED CONSTIPATION TYPE: ICD-10-CM

## 2020-07-27 DIAGNOSIS — I73.9 PVD (PERIPHERAL VASCULAR DISEASE) WITH CLAUDICATION (HCC): ICD-10-CM

## 2020-07-27 DIAGNOSIS — M79.672 LEFT FOOT PAIN: ICD-10-CM

## 2020-07-27 DIAGNOSIS — E11.40 DIABETIC NEUROPATHY (HCC): ICD-10-CM

## 2020-07-27 LAB
ALBUMIN SERPL-MCNC: 3.3 G/DL (ref 3.4–5)
ALBUMIN/GLOB SERPL: 0.8 {RATIO} (ref 1–2)
ALP LIVER SERPL-CCNC: 100 U/L (ref 55–142)
ALT SERPL-CCNC: 19 U/L (ref 13–56)
ANION GAP SERPL CALC-SCNC: 1 MMOL/L (ref 0–18)
AST SERPL-CCNC: 15 U/L (ref 15–37)
BASOPHILS # BLD AUTO: 0.05 X10(3) UL (ref 0–0.2)
BASOPHILS NFR BLD AUTO: 0.7 %
BILIRUB SERPL-MCNC: 0.4 MG/DL (ref 0.1–2)
BUN BLD-MCNC: 23 MG/DL (ref 7–18)
BUN/CREAT SERPL: 33.8 (ref 10–20)
CALCIUM BLD-MCNC: 8.9 MG/DL (ref 8.5–10.1)
CHLORIDE SERPL-SCNC: 109 MMOL/L (ref 98–112)
CO2 SERPL-SCNC: 32 MMOL/L (ref 21–32)
CREAT BLD-MCNC: 0.68 MG/DL (ref 0.55–1.02)
DEPRECATED RDW RBC AUTO: 42.9 FL (ref 35.1–46.3)
EOSINOPHIL # BLD AUTO: 0.2 X10(3) UL (ref 0–0.7)
EOSINOPHIL NFR BLD AUTO: 2.8 %
ERYTHROCYTE [DISTWIDTH] IN BLOOD BY AUTOMATED COUNT: 12.9 % (ref 11–15)
GLOBULIN PLAS-MCNC: 4 G/DL (ref 2.8–4.4)
GLUCOSE BLD-MCNC: 121 MG/DL (ref 70–99)
HCT VFR BLD AUTO: 35.7 % (ref 35–48)
HGB BLD-MCNC: 11.4 G/DL (ref 12–16)
IMM GRANULOCYTES # BLD AUTO: 0.02 X10(3) UL (ref 0–1)
IMM GRANULOCYTES NFR BLD: 0.3 %
LYMPHOCYTES # BLD AUTO: 2.5 X10(3) UL (ref 1–4)
LYMPHOCYTES NFR BLD AUTO: 35.2 %
M PROTEIN MFR SERPL ELPH: 7.3 G/DL (ref 6.4–8.2)
MCH RBC QN AUTO: 29.3 PG (ref 26–34)
MCHC RBC AUTO-ENTMCNC: 31.9 G/DL (ref 31–37)
MCV RBC AUTO: 91.8 FL (ref 80–100)
MONOCYTES # BLD AUTO: 0.58 X10(3) UL (ref 0.1–1)
MONOCYTES NFR BLD AUTO: 8.2 %
NEUTROPHILS # BLD AUTO: 3.75 X10 (3) UL (ref 1.5–7.7)
NEUTROPHILS # BLD AUTO: 3.75 X10(3) UL (ref 1.5–7.7)
NEUTROPHILS NFR BLD AUTO: 52.8 %
OSMOLALITY SERPL CALC.SUM OF ELEC: 299 MOSM/KG (ref 275–295)
PATIENT FASTING Y/N/NP: NO
PLATELET # BLD AUTO: 208 10(3)UL (ref 150–450)
POTASSIUM SERPL-SCNC: 3.7 MMOL/L (ref 3.5–5.1)
RBC # BLD AUTO: 3.89 X10(6)UL (ref 3.8–5.3)
SODIUM SERPL-SCNC: 142 MMOL/L (ref 136–145)
TSI SER-ACNC: 0.7 MIU/ML (ref 0.36–3.74)
WBC # BLD AUTO: 7.1 X10(3) UL (ref 4–11)

## 2020-07-27 PROCEDURE — 36415 COLL VENOUS BLD VENIPUNCTURE: CPT

## 2020-07-27 PROCEDURE — 80053 COMPREHEN METABOLIC PANEL: CPT

## 2020-07-27 PROCEDURE — 93970 EXTREMITY STUDY: CPT | Performed by: PODIATRIST

## 2020-07-27 PROCEDURE — 93970 EXTREMITY STUDY: CPT

## 2020-07-27 PROCEDURE — 84443 ASSAY THYROID STIM HORMONE: CPT

## 2020-07-27 PROCEDURE — 85025 COMPLETE CBC W/AUTO DIFF WBC: CPT

## 2020-08-11 ENCOUNTER — LAB REQUISITION (OUTPATIENT)
Dept: LAB | Facility: HOSPITAL | Age: 65
End: 2020-08-11
Payer: COMMERCIAL

## 2020-08-11 ENCOUNTER — PATIENT MESSAGE (OUTPATIENT)
Dept: GASTROENTEROLOGY | Facility: CLINIC | Age: 65
End: 2020-08-11

## 2020-08-11 DIAGNOSIS — Z20.828 CONTACT WITH AND (SUSPECTED) EXPOSURE TO OTHER VIRAL COMMUNICABLE DISEASES: ICD-10-CM

## 2020-08-12 ENCOUNTER — PATIENT MESSAGE (OUTPATIENT)
Dept: GASTROENTEROLOGY | Facility: CLINIC | Age: 65
End: 2020-08-12

## 2020-08-12 LAB — SARS-COV-2 RNA RESP QL NAA+PROBE: NOT DETECTED

## 2020-08-12 NOTE — TELEPHONE ENCOUNTER
This patient is scheduled for 8/13/2020    I sent a referral to [facility] Prior Auth department, please contact this patients  insurance to check for a PA.  Thank you    You may close this TE when done :)

## 2020-08-12 NOTE — TELEPHONE ENCOUNTER
From: Elsy [place]  To: Newton Moser PA-C  Sent: 8/11/2020 5:18 PM CDT  Subject: Other    Good Evening Jessy,    I'm writing this message on behalf of my mother since she can't write in [place].  She has scheduled her Endoscopy & Colonoscopy per your olivia

## 2020-08-12 NOTE — TELEPHONE ENCOUNTER
From: Prachi Pillai  To: Shirin Lazaro PA-C  Sent: 8/12/2020 10:31 AM CDT  Subject: Other    Hello,    I've received the letter for my job but it is dated starting today 8/12.  My mother did her COVID testing yesterday 8/11 so she has been home since yester

## 2020-08-12 NOTE — TELEPHONE ENCOUNTER
Called [facility] at [phone] and spoke with Mitzy. Case # [id number]. Spoke with Hector Ayala in Nurse Review who approved the case.   Auth # [id number] is valid from 8/13/20 to 11/13/20

## 2020-08-13 ENCOUNTER — LAB REQUISITION (OUTPATIENT)
Dept: LAB | Facility: HOSPITAL | Age: 65
End: 2020-08-13
Payer: COMMERCIAL

## 2020-08-13 ENCOUNTER — SURGERY CENTER DOCUMENTATION (OUTPATIENT)
Dept: SURGERY | Age: 65
End: 2020-08-13

## 2020-08-13 DIAGNOSIS — Z12.11 ENCOUNTER FOR SCREENING FOR MALIGNANT NEOPLASM OF COLON: ICD-10-CM

## 2020-08-13 PROCEDURE — 88312 SPECIAL STAINS GROUP 1: CPT | Performed by: INTERNAL MEDICINE

## 2020-08-13 PROCEDURE — 88305 TISSUE EXAM BY PATHOLOGIST: CPT | Performed by: INTERNAL MEDICINE

## 2020-08-13 NOTE — PROCEDURES
[place] [facility]      EGD AND COLONOSCOPY PROCEDURE REPORTS:    DATE OF PROCEDURE:  8/13/2020    PCP: Raine Gerardo DO     PREOPERATIVE DIAGNOSIS:  GERD symptoms, epigastric abdominal pain, dyspepsia, screening colonoscopy exam     POSTOPER ascending colon. Multiple trips made up and down the ascending colon and out of the cecum. Cecum was confirmed by landmarks, including appendiceal orifice, cecal trifold, ileocecal valve. Retroflexion was performed in the rectum.     The quality of the p

## 2020-08-24 ENCOUNTER — VIRTUAL PHONE E/M (OUTPATIENT)
Dept: INTERNAL MEDICINE CLINIC | Facility: CLINIC | Age: 65
End: 2020-08-24
Payer: COMMERCIAL

## 2020-08-24 DIAGNOSIS — G43.709 CHRONIC MIGRAINE WITHOUT AURA WITHOUT STATUS MIGRAINOSUS, NOT INTRACTABLE: Primary | ICD-10-CM

## 2020-08-24 DIAGNOSIS — A08.4 GASTROENTERITIS AND COLITIS, VIRAL: ICD-10-CM

## 2020-08-24 PROCEDURE — 99213 OFFICE O/P EST LOW 20 MIN: CPT | Performed by: FAMILY MEDICINE

## 2020-08-24 RX ORDER — BUTALBITAL, ASPIRIN, AND CAFFEINE 50; 325; 40 MG/1; MG/1; MG/1
1 CAPSULE ORAL EVERY 6 HOURS PRN
Qty: 20 CAPSULE | Refills: 1 | Status: SHIPPED | OUTPATIENT
Start: 2020-08-24 | End: 2021-02-05

## 2020-08-24 NOTE — PROGRESS NOTES
Virtual Telephone Check-In    Cristhian Ramirez verbally {consents to/declines (Can be done by front staff):3423} a Virtual/Telephone Check-In visit on 08/24/20.   Patient has been referred to the [facility] website at www.[facility].[email or web address]/consents to review the yearly

## 2020-08-25 NOTE — PROGRESS NOTES
Virtual Telephone Check-In    Dahlia Varela verbally consents to a Virtual/Telephone Check-In visit on 08/24/20. Patient has been referred to the [facility] website at [email or web address]/[email or web address] to review the yearly Consent to Treat document.     Patient underst

## 2020-08-27 ENCOUNTER — TELEPHONE (OUTPATIENT)
Dept: INTERNAL MEDICINE CLINIC | Facility: CLINIC | Age: 65
End: 2020-08-27

## 2020-08-27 DIAGNOSIS — G43.709 CHRONIC MIGRAINE WITHOUT AURA WITHOUT STATUS MIGRAINOSUS, NOT INTRACTABLE: Primary | ICD-10-CM

## 2020-08-27 RX ORDER — ATENOLOL 50 MG/1
50 TABLET ORAL DAILY
Qty: 90 TABLET | Refills: 0 | Status: SHIPPED | OUTPATIENT
Start: 2020-08-27 | End: 2020-10-05

## 2020-08-27 NOTE — TELEPHONE ENCOUNTER
Spoke with patient, migraines better but not fully resolved. Residual nausea could be migranous in nature    Sent rx for atenolol 50 mg q am for migraine prevention.

## 2020-08-27 NOTE — TELEPHONE ENCOUNTER
Patient wants to know if medication that was prescribed to her its causing her to have nauseas/vomiting, please call back.

## 2020-08-28 ENCOUNTER — TELEPHONE (OUTPATIENT)
Dept: GASTROENTEROLOGY | Facility: CLINIC | Age: 65
End: 2020-08-28

## 2020-08-28 NOTE — TELEPHONE ENCOUNTER
Results letter mailed out to patient per   Recall for Colon in 7 yrs ,8/13/2027  Per   Colon done 8/13/2020   Updated  Kirill Sanders MD  P [facility] Gi Clinical Staff             GI RNs - 1.  Please print and mail this letter to bria

## 2020-09-19 ENCOUNTER — HOSPITAL ENCOUNTER (OUTPATIENT)
Dept: ULTRASOUND IMAGING | Facility: HOSPITAL | Age: 65
Discharge: HOME | End: 2020-09-19
Attending: PHYSICIAN ASSISTANT
Payer: COMMERCIAL

## 2020-09-19 ENCOUNTER — PATIENT MESSAGE (OUTPATIENT)
Dept: GASTROENTEROLOGY | Facility: CLINIC | Age: 65
End: 2020-09-19

## 2020-09-19 DIAGNOSIS — R10.11 RUQ ABDOMINAL PAIN: ICD-10-CM

## 2020-09-19 DIAGNOSIS — R10.13 EPIGASTRIC ABDOMINAL PAIN: ICD-10-CM

## 2020-09-19 DIAGNOSIS — R12 HEARTBURN: ICD-10-CM

## 2020-09-19 PROCEDURE — 76705 ECHO EXAM OF ABDOMEN: CPT | Performed by: PHYSICIAN ASSISTANT

## 2020-09-19 NOTE — TELEPHONE ENCOUNTER
GI RNs and schedulers–    Ultrasound today (Saturday) shows choledocholithiasis in a patient with history of severe previous choledocholithiasis. Likely chronic problem.     \"BILE DUCTS:    Dilated common bile duct measuring up to 1.59 cm with evidence of

## 2020-09-21 ENCOUNTER — TELEPHONE (OUTPATIENT)
Dept: GASTROENTEROLOGY | Facility: CLINIC | Age: 65
End: 2020-09-21

## 2020-09-21 DIAGNOSIS — R10.9 ABDOMINAL PAIN, UNSPECIFIED ABDOMINAL LOCATION: Primary | ICD-10-CM

## 2020-09-21 DIAGNOSIS — K80.50 CHOLEDOCHOLITHIASIS: ICD-10-CM

## 2020-09-21 NOTE — TELEPHONE ENCOUNTER
Patient requested me to call Onelia  - [phone] to review results. I called her daughter at her request and left a generic VM. Late entry result note/TE from 9/19 - I was called by the radiologist Lo Juarez on Saturday regarding abnormal US result.  I advised

## 2020-09-21 NOTE — TELEPHONE ENCOUNTER
This was sent as a mychart message (low priority) rather than high priority phone encounter. GI schedulers do not see Mychart messages. Forwarded high priority to GI schedulers for tomorrow. Please see Dr Remigio Dejesus Pl orders below .  I left message on voicemail t She just had EGD and colonoscopy examinations with me 8/13/2020  Previous ERCP by myself 6/20/2012, extreme choledocholithiasis and breathless verbose dictation.     ====     Schedule ERCP (stomach endoscopy) exam at [facility]     BMI Readings from Last 1 Encoun

## 2020-09-22 NOTE — TELEPHONE ENCOUNTER
Dr. Julio Plata - unable to get pt in this week due to Covid testing & location for ERCP to be done. Can I add this pt on 9/29 at 12:30 pm?     Please advise & I will call the pt's daughter to get her scheduled. Routed HP! Thank you!

## 2020-09-22 NOTE — TELEPHONE ENCOUNTER
I spoke with patient daughter Angelica Carpio [phone]    Scheduled for:  ERCP w/fluroscopy [phone]  Provider Name:  Dr. Heather Ye  Date:  9/29/20  Location:    [facility]  Sedation:  MAC  Time:  2567 (pt is aware to arrive at 1130)   Prep:   Nothing to eat after midnight

## 2020-09-22 NOTE — TELEPHONE ENCOUNTER
Yes please for scheduling on 9/29. Please advise Ms. Pritchett that if the pain becomes severe or FEVER she should come into the ED for admission. She has choledocholithiasis, probably multiple large gallstones in her bile duct.

## 2020-09-23 ENCOUNTER — TELEPHONE (OUTPATIENT)
Dept: INTERNAL MEDICINE CLINIC | Facility: CLINIC | Age: 65
End: 2020-09-23

## 2020-09-23 NOTE — TELEPHONE ENCOUNTER
I spoke to the pt's daughter Marilyn Liu and she states she would bring her mother to the ER if her symptoms worsened but she is actually doing ok.  She voices understanding

## 2020-09-23 NOTE — TELEPHONE ENCOUNTER
Patient's daughter called, as her mother is having a total knee replacement on 10/19. The surgeon ordered all of her pre op tests. Which is having done this week.     She's looking for a day and time to come into see Dr. Sy Goodman in the upcoming weeks for h

## 2020-09-24 NOTE — TELEPHONE ENCOUNTER
Called the patient's daughter back. She'll call the office back after she's able to look at their calendar for the upcoming pre op appointment her mother needs.

## 2020-09-27 ENCOUNTER — APPOINTMENT (OUTPATIENT)
Dept: LAB | Facility: HOSPITAL | Age: 65
End: 2020-09-27
Attending: INTERNAL MEDICINE
Payer: COMMERCIAL

## 2020-09-27 DIAGNOSIS — Z01.818 PRE-OP TESTING: ICD-10-CM

## 2020-09-29 ENCOUNTER — APPOINTMENT (OUTPATIENT)
Dept: GENERAL RADIOLOGY | Facility: HOSPITAL | Age: 65
End: 2020-09-29
Attending: INTERNAL MEDICINE
Payer: COMMERCIAL

## 2020-09-29 ENCOUNTER — ANESTHESIA (OUTPATIENT)
Dept: ENDOSCOPY | Facility: HOSPITAL | Age: 65
End: 2020-09-29
Payer: COMMERCIAL

## 2020-09-29 ENCOUNTER — ANESTHESIA EVENT (OUTPATIENT)
Dept: ENDOSCOPY | Facility: HOSPITAL | Age: 65
End: 2020-09-29
Payer: COMMERCIAL

## 2020-09-29 ENCOUNTER — HOSPITAL ENCOUNTER (OUTPATIENT)
Facility: HOSPITAL | Age: 65
Setting detail: HOSPITAL OUTPATIENT SURGERY
Discharge: HOME | End: 2020-09-29
Attending: INTERNAL MEDICINE | Admitting: INTERNAL MEDICINE
Payer: COMMERCIAL

## 2020-09-29 VITALS
BODY MASS INDEX: 35.34 KG/M2 | SYSTOLIC BLOOD PRESSURE: 147 MMHG | HEART RATE: 54 BPM | RESPIRATION RATE: 14 BRPM | OXYGEN SATURATION: 98 % | WEIGHT: 180 LBS | TEMPERATURE: 98 F | DIASTOLIC BLOOD PRESSURE: 48 MMHG | HEIGHT: 60 IN

## 2020-09-29 DIAGNOSIS — Z01.818 PRE-OP TESTING: Primary | ICD-10-CM

## 2020-09-29 DIAGNOSIS — R10.9 ABDOMINAL PAIN, UNSPECIFIED ABDOMINAL LOCATION: ICD-10-CM

## 2020-09-29 DIAGNOSIS — K80.50 CHOLEDOCHOLITHIASIS: ICD-10-CM

## 2020-09-29 PROCEDURE — 43264 ERCP REMOVE DUCT CALCULI: CPT | Performed by: INTERNAL MEDICINE

## 2020-09-29 PROCEDURE — 0FC98ZZ EXTIRPATION OF MATTER FROM COMMON BILE DUCT, VIA NATURAL OR ARTIFICIAL OPENING ENDOSCOPIC: ICD-10-PCS | Performed by: INTERNAL MEDICINE

## 2020-09-29 PROCEDURE — 74328 X-RAY BILE DUCT ENDOSCOPY: CPT | Performed by: INTERNAL MEDICINE

## 2020-09-29 RX ORDER — PROCHLORPERAZINE EDISYLATE 5 MG/ML
5 INJECTION INTRAMUSCULAR; INTRAVENOUS ONCE AS NEEDED
Status: DISCONTINUED | OUTPATIENT
Start: 2020-09-29 | End: 2020-09-29 | Stop reason: HOSPADM

## 2020-09-29 RX ORDER — HALOPERIDOL 5 MG/ML
0.25 INJECTION INTRAMUSCULAR ONCE AS NEEDED
Status: DISCONTINUED | OUTPATIENT
Start: 2020-09-29 | End: 2020-09-29 | Stop reason: HOSPADM

## 2020-09-29 RX ORDER — METOPROLOL TARTRATE 5 MG/5ML
2.5 INJECTION INTRAVENOUS ONCE
Status: DISCONTINUED | OUTPATIENT
Start: 2020-09-29 | End: 2020-09-29 | Stop reason: HOSPADM

## 2020-09-29 RX ORDER — DEXTROSE MONOHYDRATE 25 G/50ML
50 INJECTION, SOLUTION INTRAVENOUS
Status: DISCONTINUED | OUTPATIENT
Start: 2020-09-29 | End: 2020-09-29 | Stop reason: HOSPADM

## 2020-09-29 RX ORDER — SODIUM CHLORIDE, SODIUM LACTATE, POTASSIUM CHLORIDE, CALCIUM CHLORIDE 600; 310; 30; 20 MG/100ML; MG/100ML; MG/100ML; MG/100ML
INJECTION, SOLUTION INTRAVENOUS CONTINUOUS
Status: DISCONTINUED | OUTPATIENT
Start: 2020-09-29 | End: 2020-09-29 | Stop reason: HOSPADM

## 2020-09-29 RX ORDER — NALOXONE HYDROCHLORIDE 0.4 MG/ML
80 INJECTION, SOLUTION INTRAMUSCULAR; INTRAVENOUS; SUBCUTANEOUS AS NEEDED
Status: DISCONTINUED | OUTPATIENT
Start: 2020-09-29 | End: 2020-09-29 | Stop reason: HOSPADM

## 2020-09-29 RX ORDER — ONDANSETRON 2 MG/ML
4 INJECTION INTRAMUSCULAR; INTRAVENOUS ONCE AS NEEDED
Status: COMPLETED | OUTPATIENT
Start: 2020-09-29 | End: 2020-09-29

## 2020-09-29 RX ORDER — LIDOCAINE HYDROCHLORIDE 10 MG/ML
INJECTION, SOLUTION EPIDURAL; INFILTRATION; INTRACAUDAL; PERINEURAL AS NEEDED
Status: DISCONTINUED | OUTPATIENT
Start: 2020-09-29 | End: 2020-09-29 | Stop reason: SURG

## 2020-09-29 RX ORDER — SODIUM CHLORIDE, SODIUM LACTATE, POTASSIUM CHLORIDE, CALCIUM CHLORIDE 600; 310; 30; 20 MG/100ML; MG/100ML; MG/100ML; MG/100ML
INJECTION, SOLUTION INTRAVENOUS CONTINUOUS
Status: DISCONTINUED | OUTPATIENT
Start: 2020-09-29 | End: 2020-09-29

## 2020-09-29 RX ORDER — ONDANSETRON 2 MG/ML
INJECTION INTRAMUSCULAR; INTRAVENOUS AS NEEDED
Status: DISCONTINUED | OUTPATIENT
Start: 2020-09-29 | End: 2020-09-29 | Stop reason: SURG

## 2020-09-29 RX ADMIN — ONDANSETRON 4 MG: 2 INJECTION INTRAMUSCULAR; INTRAVENOUS at 13:53:00

## 2020-09-29 RX ADMIN — LIDOCAINE HYDROCHLORIDE 50 MG: 10 INJECTION, SOLUTION EPIDURAL; INFILTRATION; INTRACAUDAL; PERINEURAL at 13:31:00

## 2020-09-29 NOTE — H&P
History & Physical Examination    Patient Name: Celso Haney  MRN: [id number]  CSN: [id number]  YOB: 1955    Diagnosis: Epigastric abdominal pain, choledocholithiasis    Present Illness: Ayana 78-year-old woman with history hypertension dy tablet (10 mg total) by mouth daily. , Disp: 30 tablet, Rfl: 0, 9/28/2020    •  atenolol 50 MG Oral Tab, Take 1 tablet (50 mg total) by mouth daily. , Disp: 90 tablet, Rfl: 0    •  Butalbital-APAP-Caffeine -40 MG Oral Cap, Take 1 capsule by mouth every 300 [facility] MAIN OR   • TONSILLECTOMY  1992     Family History   Problem Relation Age of Onset   • Prostate Cancer Father    • Diabetes Mother         Diabetes Type 2   • Other (Other) Mother    • Hypertension Other         Family h/o   • Other (Other) Other

## 2020-09-29 NOTE — ANESTHESIA POSTPROCEDURE EVALUATION
Patient: Prachi Pillai    Procedure Summary     Date: 09/29/20 Room / Location: [address] [place] [address] ENDOSCOPY 01 / [address] [place] [address] ENDOSCOPY    Anesthesia Start: 2517 Anesthesia Stop: 1333    Procedure: ENDOSCOPIC RETROGRADE CHOLANGIOPANCREATOGRAPHY (ERCP) (N/A ) Diagnosis:       A

## 2020-09-29 NOTE — ANESTHESIA PROCEDURE NOTES
Airway  Date/Time: 9/29/2020 1:33 PM  Urgency: elective    Airway not difficult    General Information and Staff    Patient location during procedure: endo  Resident/CRNA: Amairani Charles CRNA  Performed: CRNA     Indications and Patient Condition  Ind

## 2020-09-29 NOTE — ANESTHESIA PREPROCEDURE EVALUATION
Anesthesia PreOp Note    HPI:     Darcy Juarez is a 77year old female who presents for preoperative consultation requested by: Horacio Lora MD    Date of Surgery: 9/29/2020    Procedure(s):  ENDOSCOPIC RETROGRADE CHOLANGIOPANCREATOGRAPHY ( 9/28/2020    •  aspirin 325 MG Oral Tab, Take 1 tablet (325 mg total) by mouth daily. , Disp: 30 tablet, Rfl: 0, 9/27/2020    •  atorvastatin 10 MG Oral Tab, Take 1 tablet (10 mg total) by mouth daily. , Disp: 30 tablet, Rfl: 0, 9/28/2020    •  atenolol 50 M Father    • Diabetes Mother         Diabetes Type 2   • Other (Other) Mother    • Hypertension Other         Family h/o   • Other (Other) Other         Family h   • Other (Other) Sister         Gallbladder disease     Social History    Socioeconomic Histor Concern: Not Asked        Special Diet: Not Asked        Back Care: Not Asked        Exercise: Not Asked        Bike Helmet: Not Asked        Seat Belt: Not Asked        Self-Exams: Not Asked    Social History Narrative      Not on file      Available pre- management. All of the patient's questions were answered to the best of my ability. The patient desires the anesthetic management as planned.   Jeremias Ross  9/29/2020 12:32 PM

## 2020-09-29 NOTE — OPERATIVE REPORT
ERCP PROCEDURE REPORT    DATE OF PROCEDURE:  9/29/2020    PCP: Antoine Elliott DO     PREOPERATIVE DIAGNOSIS: Epigastric abdominal pain, choledocholithiasis     POSTOPERATIVE DIAGNOSIS: Choledocholithiasis, abnormal dilated bile duct     SURGEON:  Jared Johnson mobile irregular filling defect at the very proximal end of common hepatic duct where it narrowed into the intrahepatic convergence. Cholangiograms saved.   · The extrahepatic biliary tree was dilated to approximately 18 mm diameter distal CHD proximal CBD stones and sludge during that exam.

## 2020-10-02 DIAGNOSIS — G43.709 CHRONIC MIGRAINE WITHOUT AURA WITHOUT STATUS MIGRAINOSUS, NOT INTRACTABLE: ICD-10-CM

## 2020-10-05 ENCOUNTER — OFFICE VISIT (OUTPATIENT)
Dept: INTERNAL MEDICINE CLINIC | Facility: CLINIC | Age: 65
End: 2020-10-05
Payer: COMMERCIAL

## 2020-10-05 VITALS
DIASTOLIC BLOOD PRESSURE: 74 MMHG | HEART RATE: 82 BPM | OXYGEN SATURATION: 99 % | HEIGHT: 60 IN | WEIGHT: 180 LBS | SYSTOLIC BLOOD PRESSURE: 120 MMHG | BODY MASS INDEX: 35.34 KG/M2 | RESPIRATION RATE: 17 BRPM

## 2020-10-05 DIAGNOSIS — M17.11 PRIMARY OSTEOARTHRITIS OF RIGHT KNEE: ICD-10-CM

## 2020-10-05 DIAGNOSIS — G43.109 MIGRAINE WITH AURA AND WITHOUT STATUS MIGRAINOSUS, NOT INTRACTABLE: ICD-10-CM

## 2020-10-05 DIAGNOSIS — Z01.818 PREOP EXAMINATION: Primary | ICD-10-CM

## 2020-10-05 DIAGNOSIS — Z23 NEED FOR INFLUENZA VACCINATION: ICD-10-CM

## 2020-10-05 DIAGNOSIS — R51.9 FREQUENT HEADACHES: ICD-10-CM

## 2020-10-05 PROCEDURE — 3074F SYST BP LT 130 MM HG: CPT | Performed by: FAMILY MEDICINE

## 2020-10-05 PROCEDURE — 90662 IIV NO PRSV INCREASED AG IM: CPT | Performed by: FAMILY MEDICINE

## 2020-10-05 PROCEDURE — 3008F BODY MASS INDEX DOCD: CPT | Performed by: FAMILY MEDICINE

## 2020-10-05 PROCEDURE — 99213 OFFICE O/P EST LOW 20 MIN: CPT | Performed by: FAMILY MEDICINE

## 2020-10-05 PROCEDURE — 3078F DIAST BP <80 MM HG: CPT | Performed by: FAMILY MEDICINE

## 2020-10-05 PROCEDURE — 90471 IMMUNIZATION ADMIN: CPT | Performed by: FAMILY MEDICINE

## 2020-10-05 RX ORDER — ATENOLOL 50 MG/1
TABLET ORAL
Qty: 90 TABLET | Refills: 0 | Status: SHIPPED | OUTPATIENT
Start: 2020-10-05 | End: 2021-02-05

## 2020-10-06 NOTE — PROGRESS NOTES
Laura Chowdhury is a 77year old female. Patient presents with:  Pre-Op Exam: Pt here for pre-op-->RIGHT TOTAL KNEE ARTHROPLASTY 10/19/20 w/ Dr. Starr Escoto. HPI:   PREOP EXAM      PRE-OP Physical  What is the full name of procedure/ surgery?   Right tka  D ibuprofen 600 MG Oral Tab      • losartan 100 MG Oral Tab      • Losartan Potassium-HCTZ 100-12.5 MG Oral Tab      • naproxen 125 MG/5ML Oral Suspension Take by mouth 2 (two) times daily. • naproxen 500 MG Oral Tab Take by mouth 2 (two) times daily. Readings from Last 6 Encounters:  10/05/20 : 180 lb (81.6 kg)  09/29/20 : 180 lb (81.6 kg)  07/08/20 : 184 lb (83.5 kg)  07/22/19 : 183 lb (83 kg)  03/08/18 : 184 lb (83.5 kg)  08/18/17 : 186 lb 12.8 oz (84.7 kg)      REVIEW OF SYSTEMS:   GENERAL HEALTH: f

## 2021-01-25 ENCOUNTER — LAB ENCOUNTER (OUTPATIENT)
Dept: LAB | Facility: HOSPITAL | Age: 66
End: 2021-01-25
Attending: NEUROLOGICAL SURGERY
Payer: OTHER MISCELLANEOUS

## 2021-01-25 ENCOUNTER — HOSPITAL ENCOUNTER (OUTPATIENT)
Dept: GENERAL RADIOLOGY | Facility: HOSPITAL | Age: 66
Discharge: HOME | End: 2021-01-25
Attending: NEUROLOGICAL SURGERY
Payer: OTHER MISCELLANEOUS

## 2021-01-25 DIAGNOSIS — Z01.811 PRE-OP CHEST EXAM: ICD-10-CM

## 2021-01-25 DIAGNOSIS — Z01.810 PRE-OPERATIVE CARDIOVASCULAR EXAMINATION: Primary | ICD-10-CM

## 2021-01-25 DIAGNOSIS — Z01.812 PRE-OPERATIVE LABORATORY EXAMINATION: Primary | ICD-10-CM

## 2021-01-25 LAB
ANION GAP SERPL CALC-SCNC: 1 MMOL/L (ref 0–18)
BACTERIA UR QL AUTO: NEGATIVE /HPF
BASOPHILS # BLD AUTO: 0.06 X10(3) UL (ref 0–0.2)
BASOPHILS NFR BLD AUTO: 0.9 %
BILIRUB UR QL: NEGATIVE
BUN BLD-MCNC: 17 MG/DL (ref 7–18)
BUN/CREAT SERPL: 25.4 (ref 10–20)
CALCIUM BLD-MCNC: 9.4 MG/DL (ref 8.5–10.1)
CHLORIDE SERPL-SCNC: 109 MMOL/L (ref 98–112)
CLARITY UR: CLEAR
CO2 SERPL-SCNC: 31 MMOL/L (ref 21–32)
COLOR UR: YELLOW
CREAT BLD-MCNC: 0.67 MG/DL
DEPRECATED RDW RBC AUTO: 41.3 FL (ref 35.1–46.3)
EOSINOPHIL # BLD AUTO: 0.26 X10(3) UL (ref 0–0.7)
EOSINOPHIL NFR BLD AUTO: 3.8 %
ERYTHROCYTE [DISTWIDTH] IN BLOOD BY AUTOMATED COUNT: 12.7 % (ref 11–15)
GLUCOSE BLD-MCNC: 128 MG/DL (ref 70–99)
GLUCOSE UR-MCNC: NEGATIVE MG/DL
HCT VFR BLD AUTO: 37.9 %
HGB BLD-MCNC: 12.2 G/DL
IMM GRANULOCYTES # BLD AUTO: 0.02 X10(3) UL (ref 0–1)
IMM GRANULOCYTES NFR BLD: 0.3 %
INR BLD: 0.92 (ref 0.9–1.2)
KETONES UR-MCNC: NEGATIVE MG/DL
LEUKOCYTE ESTERASE UR QL STRIP.AUTO: NEGATIVE
LYMPHOCYTES # BLD AUTO: 2.33 X10(3) UL (ref 1–4)
LYMPHOCYTES NFR BLD AUTO: 34.1 %
MCH RBC QN AUTO: 28.8 PG (ref 26–34)
MCHC RBC AUTO-ENTMCNC: 32.2 G/DL (ref 31–37)
MCV RBC AUTO: 89.4 FL
MONOCYTES # BLD AUTO: 0.53 X10(3) UL (ref 0.1–1)
MONOCYTES NFR BLD AUTO: 7.7 %
MRSA DNA SPEC QL NAA+PROBE: NEGATIVE
NEUTROPHILS # BLD AUTO: 3.64 X10 (3) UL (ref 1.5–7.7)
NEUTROPHILS # BLD AUTO: 3.64 X10(3) UL (ref 1.5–7.7)
NEUTROPHILS NFR BLD AUTO: 53.2 %
NITRITE UR QL STRIP.AUTO: NEGATIVE
OSMOLALITY SERPL CALC.SUM OF ELEC: 295 MOSM/KG (ref 275–295)
PATIENT FASTING Y/N/NP: YES
PH UR: 6 [PH] (ref 5–8)
PLATELET # BLD AUTO: 241 10(3)UL (ref 150–450)
POTASSIUM SERPL-SCNC: 4 MMOL/L (ref 3.5–5.1)
PROT UR-MCNC: NEGATIVE MG/DL
PROTHROMBIN TIME: 12.2 SECONDS (ref 11.8–14.5)
RBC # BLD AUTO: 4.24 X10(6)UL
RBC #/AREA URNS AUTO: 6 /HPF
SODIUM SERPL-SCNC: 141 MMOL/L (ref 136–145)
SP GR UR STRIP: 1.02 (ref 1–1.03)
UROBILINOGEN UR STRIP-ACNC: <2
WBC # BLD AUTO: 6.8 X10(3) UL (ref 4–11)
WBC #/AREA URNS AUTO: 1 /HPF

## 2021-01-25 PROCEDURE — 85025 COMPLETE CBC W/AUTO DIFF WBC: CPT

## 2021-01-25 PROCEDURE — 93005 ELECTROCARDIOGRAM TRACING: CPT

## 2021-01-25 PROCEDURE — 81001 URINALYSIS AUTO W/SCOPE: CPT

## 2021-01-25 PROCEDURE — 36415 COLL VENOUS BLD VENIPUNCTURE: CPT

## 2021-01-25 PROCEDURE — 87641 MR-STAPH DNA AMP PROBE: CPT

## 2021-01-25 PROCEDURE — 93010 ELECTROCARDIOGRAM REPORT: CPT | Performed by: NEUROLOGICAL SURGERY

## 2021-01-25 PROCEDURE — 80048 BASIC METABOLIC PNL TOTAL CA: CPT

## 2021-01-25 PROCEDURE — 87086 URINE CULTURE/COLONY COUNT: CPT

## 2021-01-25 PROCEDURE — 85610 PROTHROMBIN TIME: CPT

## 2021-01-25 PROCEDURE — 71046 X-RAY EXAM CHEST 2 VIEWS: CPT | Performed by: NEUROLOGICAL SURGERY

## 2021-01-29 DIAGNOSIS — Z23 NEED FOR VACCINATION: ICD-10-CM

## 2021-02-05 RX ORDER — ASPIRIN 81 MG/1
81 TABLET ORAL DAILY
Status: ON HOLD | COMMUNITY
End: 2021-02-12

## 2021-02-05 RX ORDER — IBUPROFEN 800 MG/1
800 TABLET ORAL EVERY 6 HOURS PRN
Status: ON HOLD | COMMUNITY
End: 2021-02-12

## 2021-02-07 ENCOUNTER — LAB ENCOUNTER (OUTPATIENT)
Dept: LAB | Facility: HOSPITAL | Age: 66
End: 2021-02-07
Attending: NEUROLOGICAL SURGERY
Payer: OTHER MISCELLANEOUS

## 2021-02-07 DIAGNOSIS — Z01.818 PREOP TESTING: ICD-10-CM

## 2021-02-08 LAB — SARS-COV-2 RNA RESP QL NAA+PROBE: NOT DETECTED

## 2021-02-09 ENCOUNTER — ANESTHESIA (OUTPATIENT)
Dept: SURGERY | Facility: HOSPITAL | Age: 66
DRG: 473 | End: 2021-02-09
Payer: OTHER MISCELLANEOUS

## 2021-02-09 ENCOUNTER — HOSPITAL ENCOUNTER (INPATIENT)
Facility: HOSPITAL | Age: 66
LOS: 3 days | Discharge: HOME | DRG: 473 | End: 2021-02-12
Attending: NEUROLOGICAL SURGERY | Admitting: NEUROLOGICAL SURGERY
Payer: OTHER MISCELLANEOUS

## 2021-02-09 ENCOUNTER — ANESTHESIA EVENT (OUTPATIENT)
Dept: SURGERY | Facility: HOSPITAL | Age: 66
DRG: 473 | End: 2021-02-09
Payer: OTHER MISCELLANEOUS

## 2021-02-09 ENCOUNTER — APPOINTMENT (OUTPATIENT)
Dept: GENERAL RADIOLOGY | Facility: HOSPITAL | Age: 66
DRG: 473 | End: 2021-02-09
Attending: NEUROLOGICAL SURGERY
Payer: OTHER MISCELLANEOUS

## 2021-02-09 DIAGNOSIS — Z01.818 PREOP TESTING: Primary | ICD-10-CM

## 2021-02-09 PROBLEM — M48.02 CERVICAL SPINAL STENOSIS: Status: ACTIVE | Noted: 2021-02-09

## 2021-02-09 PROBLEM — E78.5 HYPERLIPIDEMIA: Chronic | Status: ACTIVE | Noted: 2021-02-09

## 2021-02-09 PROBLEM — M47.22 CERVICAL SPONDYLOSIS WITH RADICULOPATHY: Status: ACTIVE | Noted: 2021-02-09

## 2021-02-09 PROBLEM — I10 ESSENTIAL HYPERTENSION: Chronic | Status: ACTIVE | Noted: 2021-02-09

## 2021-02-09 LAB
GLUCOSE BLDC GLUCOMTR-MCNC: 109 MG/DL (ref 70–99)
GLUCOSE BLDC GLUCOMTR-MCNC: 138 MG/DL (ref 70–99)
GLUCOSE BLDC GLUCOMTR-MCNC: 152 MG/DL (ref 70–99)

## 2021-02-09 PROCEDURE — 0RB30ZZ EXCISION OF CERVICAL VERTEBRAL DISC, OPEN APPROACH: ICD-10-PCS | Performed by: NEUROLOGICAL SURGERY

## 2021-02-09 PROCEDURE — 4A11X4G MONITORING OF PERIPHERAL NERVOUS ELECTRICAL ACTIVITY, INTRAOPERATIVE, EXTERNAL APPROACH: ICD-10-PCS | Performed by: NEUROLOGICAL SURGERY

## 2021-02-09 PROCEDURE — 01N10ZZ RELEASE CERVICAL NERVE, OPEN APPROACH: ICD-10-PCS | Performed by: NEUROLOGICAL SURGERY

## 2021-02-09 PROCEDURE — 76000 FLUOROSCOPY <1 HR PHYS/QHP: CPT | Performed by: NEUROLOGICAL SURGERY

## 2021-02-09 PROCEDURE — 99232 SBSQ HOSP IP/OBS MODERATE 35: CPT | Performed by: HOSPITALIST

## 2021-02-09 PROCEDURE — 0RG20A0 FUSION OF 2 OR MORE CERVICAL VERTEBRAL JOINTS WITH INTERBODY FUSION DEVICE, ANTERIOR APPROACH, ANTERIOR COLUMN, OPEN APPROACH: ICD-10-PCS | Performed by: NEUROLOGICAL SURGERY

## 2021-02-09 DEVICE — OSTEOCEL PRO SMALL: Type: IMPLANTABLE DEVICE | Site: SPINE CERVICAL | Status: FUNCTIONAL

## 2021-02-09 DEVICE — PLATE 2 LEVEL 38MM 7740238: Type: IMPLANTABLE DEVICE | Status: FUNCTIONAL

## 2021-02-09 DEVICE — IMPLANTABLE DEVICE: Type: IMPLANTABLE DEVICE | Status: FUNCTIONAL

## 2021-02-09 RX ORDER — MORPHINE SULFATE 10 MG/ML
6 INJECTION, SOLUTION INTRAMUSCULAR; INTRAVENOUS EVERY 10 MIN PRN
Status: DISCONTINUED | OUTPATIENT
Start: 2021-02-09 | End: 2021-02-09 | Stop reason: HOSPADM

## 2021-02-09 RX ORDER — BISACODYL 10 MG
10 SUPPOSITORY, RECTAL RECTAL
Status: DISCONTINUED | OUTPATIENT
Start: 2021-02-09 | End: 2021-02-12

## 2021-02-09 RX ORDER — HYDROCODONE BITARTRATE AND ACETAMINOPHEN 5; 325 MG/1; MG/1
1 TABLET ORAL AS NEEDED
Status: DISCONTINUED | OUTPATIENT
Start: 2021-02-09 | End: 2021-02-09 | Stop reason: HOSPADM

## 2021-02-09 RX ORDER — ONDANSETRON 2 MG/ML
4 INJECTION INTRAMUSCULAR; INTRAVENOUS EVERY 4 HOURS PRN
Status: DISPENSED | OUTPATIENT
Start: 2021-02-09 | End: 2021-02-10

## 2021-02-09 RX ORDER — HYDROMORPHONE HYDROCHLORIDE 1 MG/ML
0.2 INJECTION, SOLUTION INTRAMUSCULAR; INTRAVENOUS; SUBCUTANEOUS EVERY 2 HOUR PRN
Status: DISCONTINUED | OUTPATIENT
Start: 2021-02-09 | End: 2021-02-11

## 2021-02-09 RX ORDER — DIPHENHYDRAMINE HYDROCHLORIDE 50 MG/ML
25 INJECTION INTRAMUSCULAR; INTRAVENOUS EVERY 4 HOURS PRN
Status: DISCONTINUED | OUTPATIENT
Start: 2021-02-09 | End: 2021-02-12

## 2021-02-09 RX ORDER — ONDANSETRON 2 MG/ML
INJECTION INTRAMUSCULAR; INTRAVENOUS AS NEEDED
Status: DISCONTINUED | OUTPATIENT
Start: 2021-02-09 | End: 2021-02-09 | Stop reason: SURG

## 2021-02-09 RX ORDER — DOCUSATE SODIUM 100 MG/1
100 CAPSULE, LIQUID FILLED ORAL 2 TIMES DAILY
Status: DISCONTINUED | OUTPATIENT
Start: 2021-02-09 | End: 2021-02-12

## 2021-02-09 RX ORDER — POLYETHYLENE GLYCOL 3350 17 G/17G
17 POWDER, FOR SOLUTION ORAL DAILY PRN
Status: DISCONTINUED | OUTPATIENT
Start: 2021-02-09 | End: 2021-02-12

## 2021-02-09 RX ORDER — ONDANSETRON 2 MG/ML
4 INJECTION INTRAMUSCULAR; INTRAVENOUS ONCE AS NEEDED
Status: DISCONTINUED | OUTPATIENT
Start: 2021-02-09 | End: 2021-02-09 | Stop reason: HOSPADM

## 2021-02-09 RX ORDER — HYDROMORPHONE HYDROCHLORIDE 1 MG/ML
0.4 INJECTION, SOLUTION INTRAMUSCULAR; INTRAVENOUS; SUBCUTANEOUS EVERY 2 HOUR PRN
Status: DISCONTINUED | OUTPATIENT
Start: 2021-02-09 | End: 2021-02-11

## 2021-02-09 RX ORDER — HYDROMORPHONE HYDROCHLORIDE 1 MG/ML
0.8 INJECTION, SOLUTION INTRAMUSCULAR; INTRAVENOUS; SUBCUTANEOUS EVERY 2 HOUR PRN
Status: DISCONTINUED | OUTPATIENT
Start: 2021-02-09 | End: 2021-02-11

## 2021-02-09 RX ORDER — SODIUM PHOSPHATE, DIBASIC AND SODIUM PHOSPHATE, MONOBASIC 7; 19 G/133ML; G/133ML
1 ENEMA RECTAL ONCE AS NEEDED
Status: DISCONTINUED | OUTPATIENT
Start: 2021-02-09 | End: 2021-02-12

## 2021-02-09 RX ORDER — ACETAMINOPHEN 500 MG
1000 TABLET ORAL ONCE
Status: COMPLETED | OUTPATIENT
Start: 2021-02-09 | End: 2021-02-09

## 2021-02-09 RX ORDER — DIPHENHYDRAMINE HCL 25 MG
25 CAPSULE ORAL EVERY 4 HOURS PRN
Status: DISCONTINUED | OUTPATIENT
Start: 2021-02-09 | End: 2021-02-12

## 2021-02-09 RX ORDER — ROCURONIUM BROMIDE 10 MG/ML
INJECTION, SOLUTION INTRAVENOUS AS NEEDED
Status: DISCONTINUED | OUTPATIENT
Start: 2021-02-09 | End: 2021-02-09 | Stop reason: SURG

## 2021-02-09 RX ORDER — LIDOCAINE HYDROCHLORIDE 10 MG/ML
INJECTION, SOLUTION EPIDURAL; INFILTRATION; INTRACAUDAL; PERINEURAL AS NEEDED
Status: DISCONTINUED | OUTPATIENT
Start: 2021-02-09 | End: 2021-02-09 | Stop reason: SURG

## 2021-02-09 RX ORDER — HYDROCODONE BITARTRATE AND ACETAMINOPHEN 5; 325 MG/1; MG/1
2 TABLET ORAL AS NEEDED
Status: DISCONTINUED | OUTPATIENT
Start: 2021-02-09 | End: 2021-02-09 | Stop reason: HOSPADM

## 2021-02-09 RX ORDER — HYDROCODONE BITARTRATE AND ACETAMINOPHEN 10; 325 MG/1; MG/1
1 TABLET ORAL EVERY 4 HOURS PRN
Status: DISCONTINUED | OUTPATIENT
Start: 2021-02-09 | End: 2021-02-11

## 2021-02-09 RX ORDER — MIDAZOLAM HYDROCHLORIDE 1 MG/ML
INJECTION INTRAMUSCULAR; INTRAVENOUS AS NEEDED
Status: DISCONTINUED | OUTPATIENT
Start: 2021-02-09 | End: 2021-02-09 | Stop reason: SURG

## 2021-02-09 RX ORDER — PROCHLORPERAZINE EDISYLATE 5 MG/ML
10 INJECTION INTRAMUSCULAR; INTRAVENOUS EVERY 6 HOURS PRN
Status: ACTIVE | OUTPATIENT
Start: 2021-02-09 | End: 2021-02-11

## 2021-02-09 RX ORDER — DEXTROSE MONOHYDRATE 25 G/50ML
50 INJECTION, SOLUTION INTRAVENOUS
Status: DISCONTINUED | OUTPATIENT
Start: 2021-02-09 | End: 2021-02-09 | Stop reason: HOSPADM

## 2021-02-09 RX ORDER — PHENYLEPHRINE HCL 10 MG/ML
VIAL (ML) INJECTION AS NEEDED
Status: DISCONTINUED | OUTPATIENT
Start: 2021-02-09 | End: 2021-02-09 | Stop reason: SURG

## 2021-02-09 RX ORDER — ACETAMINOPHEN 325 MG/1
650 TABLET ORAL EVERY 4 HOURS PRN
Status: DISCONTINUED | OUTPATIENT
Start: 2021-02-09 | End: 2021-02-12

## 2021-02-09 RX ORDER — METOCLOPRAMIDE 10 MG/1
10 TABLET ORAL ONCE
Status: COMPLETED | OUTPATIENT
Start: 2021-02-09 | End: 2021-02-09

## 2021-02-09 RX ORDER — SODIUM CHLORIDE, SODIUM LACTATE, POTASSIUM CHLORIDE, CALCIUM CHLORIDE 600; 310; 30; 20 MG/100ML; MG/100ML; MG/100ML; MG/100ML
INJECTION, SOLUTION INTRAVENOUS CONTINUOUS
Status: DISCONTINUED | OUTPATIENT
Start: 2021-02-09 | End: 2021-02-12

## 2021-02-09 RX ORDER — GLYCOPYRROLATE 0.2 MG/ML
INJECTION, SOLUTION INTRAMUSCULAR; INTRAVENOUS AS NEEDED
Status: DISCONTINUED | OUTPATIENT
Start: 2021-02-09 | End: 2021-02-09 | Stop reason: SURG

## 2021-02-09 RX ORDER — CEFAZOLIN SODIUM/WATER 2 G/20 ML
SYRINGE (ML) INTRAVENOUS AS NEEDED
Status: DISCONTINUED | OUTPATIENT
Start: 2021-02-09 | End: 2021-02-09 | Stop reason: SURG

## 2021-02-09 RX ORDER — MORPHINE SULFATE 4 MG/ML
4 INJECTION, SOLUTION INTRAMUSCULAR; INTRAVENOUS EVERY 10 MIN PRN
Status: DISCONTINUED | OUTPATIENT
Start: 2021-02-09 | End: 2021-02-09 | Stop reason: HOSPADM

## 2021-02-09 RX ORDER — SODIUM CHLORIDE, SODIUM LACTATE, POTASSIUM CHLORIDE, CALCIUM CHLORIDE 600; 310; 30; 20 MG/100ML; MG/100ML; MG/100ML; MG/100ML
INJECTION, SOLUTION INTRAVENOUS CONTINUOUS
Status: DISCONTINUED | OUTPATIENT
Start: 2021-02-09 | End: 2021-02-09 | Stop reason: HOSPADM

## 2021-02-09 RX ORDER — SODIUM CHLORIDE 9 MG/ML
INJECTION, SOLUTION INTRAVENOUS CONTINUOUS PRN
Status: DISCONTINUED | OUTPATIENT
Start: 2021-02-09 | End: 2021-02-09 | Stop reason: SURG

## 2021-02-09 RX ORDER — SENNOSIDES 8.6 MG
17.2 TABLET ORAL NIGHTLY
Status: DISCONTINUED | OUTPATIENT
Start: 2021-02-09 | End: 2021-02-12

## 2021-02-09 RX ORDER — DEXTROSE MONOHYDRATE 25 G/50ML
50 INJECTION, SOLUTION INTRAVENOUS
Status: DISCONTINUED | OUTPATIENT
Start: 2021-02-09 | End: 2021-02-12

## 2021-02-09 RX ORDER — FAMOTIDINE 20 MG/1
20 TABLET ORAL ONCE
Status: COMPLETED | OUTPATIENT
Start: 2021-02-09 | End: 2021-02-09

## 2021-02-09 RX ORDER — HYDROMORPHONE HYDROCHLORIDE 1 MG/ML
0.2 INJECTION, SOLUTION INTRAMUSCULAR; INTRAVENOUS; SUBCUTANEOUS EVERY 5 MIN PRN
Status: DISCONTINUED | OUTPATIENT
Start: 2021-02-09 | End: 2021-02-09 | Stop reason: HOSPADM

## 2021-02-09 RX ORDER — HYDROMORPHONE HYDROCHLORIDE 1 MG/ML
0.4 INJECTION, SOLUTION INTRAMUSCULAR; INTRAVENOUS; SUBCUTANEOUS EVERY 5 MIN PRN
Status: DISCONTINUED | OUTPATIENT
Start: 2021-02-09 | End: 2021-02-09 | Stop reason: HOSPADM

## 2021-02-09 RX ORDER — HYDROMORPHONE HYDROCHLORIDE 1 MG/ML
0.6 INJECTION, SOLUTION INTRAMUSCULAR; INTRAVENOUS; SUBCUTANEOUS EVERY 5 MIN PRN
Status: DISCONTINUED | OUTPATIENT
Start: 2021-02-09 | End: 2021-02-09 | Stop reason: HOSPADM

## 2021-02-09 RX ORDER — MORPHINE SULFATE 4 MG/ML
2 INJECTION, SOLUTION INTRAMUSCULAR; INTRAVENOUS EVERY 10 MIN PRN
Status: DISCONTINUED | OUTPATIENT
Start: 2021-02-09 | End: 2021-02-09 | Stop reason: HOSPADM

## 2021-02-09 RX ORDER — PROCHLORPERAZINE EDISYLATE 5 MG/ML
5 INJECTION INTRAMUSCULAR; INTRAVENOUS ONCE AS NEEDED
Status: DISCONTINUED | OUTPATIENT
Start: 2021-02-09 | End: 2021-02-09 | Stop reason: HOSPADM

## 2021-02-09 RX ORDER — NALOXONE HYDROCHLORIDE 0.4 MG/ML
80 INJECTION, SOLUTION INTRAMUSCULAR; INTRAVENOUS; SUBCUTANEOUS AS NEEDED
Status: DISCONTINUED | OUTPATIENT
Start: 2021-02-09 | End: 2021-02-09 | Stop reason: HOSPADM

## 2021-02-09 RX ORDER — TIZANIDINE 4 MG/1
4 TABLET ORAL 3 TIMES DAILY
Status: DISCONTINUED | OUTPATIENT
Start: 2021-02-09 | End: 2021-02-12

## 2021-02-09 RX ORDER — CEFAZOLIN SODIUM/WATER 2 G/20 ML
2 SYRINGE (ML) INTRAVENOUS EVERY 8 HOURS
Status: COMPLETED | OUTPATIENT
Start: 2021-02-09 | End: 2021-02-10

## 2021-02-09 RX ORDER — HYDROCODONE BITARTRATE AND ACETAMINOPHEN 10; 325 MG/1; MG/1
2 TABLET ORAL EVERY 4 HOURS PRN
Status: DISCONTINUED | OUTPATIENT
Start: 2021-02-09 | End: 2021-02-11

## 2021-02-09 RX ADMIN — PHENYLEPHRINE HCL 100 MCG: 10 MG/ML VIAL (ML) INJECTION at 13:59:00

## 2021-02-09 RX ADMIN — SODIUM CHLORIDE: 9 INJECTION, SOLUTION INTRAVENOUS at 13:46:00

## 2021-02-09 RX ADMIN — ROCURONIUM BROMIDE 5 MG: 10 INJECTION, SOLUTION INTRAVENOUS at 13:38:00

## 2021-02-09 RX ADMIN — LIDOCAINE HYDROCHLORIDE 50 MG: 10 INJECTION, SOLUTION EPIDURAL; INFILTRATION; INTRACAUDAL; PERINEURAL at 13:38:00

## 2021-02-09 RX ADMIN — SODIUM CHLORIDE, SODIUM LACTATE, POTASSIUM CHLORIDE, CALCIUM CHLORIDE: 600; 310; 30; 20 INJECTION, SOLUTION INTRAVENOUS at 16:13:00

## 2021-02-09 RX ADMIN — SODIUM CHLORIDE, SODIUM LACTATE, POTASSIUM CHLORIDE, CALCIUM CHLORIDE: 600; 310; 30; 20 INJECTION, SOLUTION INTRAVENOUS at 13:32:00

## 2021-02-09 RX ADMIN — MIDAZOLAM HYDROCHLORIDE 2 MG: 1 INJECTION INTRAMUSCULAR; INTRAVENOUS at 13:32:00

## 2021-02-09 RX ADMIN — GLYCOPYRROLATE 0.1 MG: 0.2 INJECTION, SOLUTION INTRAMUSCULAR; INTRAVENOUS at 13:38:00

## 2021-02-09 RX ADMIN — CEFAZOLIN SODIUM/WATER 2 G: 2 G/20 ML SYRINGE (ML) INTRAVENOUS at 13:52:00

## 2021-02-09 RX ADMIN — ONDANSETRON 4 MG: 2 INJECTION INTRAMUSCULAR; INTRAVENOUS at 13:52:00

## 2021-02-09 RX ADMIN — PHENYLEPHRINE HCL 100 MCG: 10 MG/ML VIAL (ML) INJECTION at 14:10:00

## 2021-02-09 NOTE — ANESTHESIA PROCEDURE NOTES
Airway  Date/Time: 2/9/2021 1:40 PM  Urgency: Elective    Airway not difficult    General Information and Staff    Patient location during procedure: OR  Anesthesiologist: Court Gutierrez MD  Resident/CRNA: Maya Boone CRNA  Performed: MICHELLE     I

## 2021-02-09 NOTE — OPERATIVE REPORT
OPERATIVE REPORT      PATIENT NAME: Trista Route    DATE OF OPERATION: 02/09/2021      PREOPERATIVE DIAGNOSIS:    1. Disc disorder C5-6 with radiculopathy. 2.  Disc disorder C6-7 with radiculopathy.   3.  Cervical spond to its fibers. Using both sharp and blunt dissection, the trachea and esophagus were dissected medially and the carotid sheath structures dissected laterally to arrive at the prevertebral space.   The C5-6 disc space was marked and this was confirmed with Foraminotomies were performed bilaterally. The thecal sac and neural elements were completely decompressed. A right-angle nerve root probe was used to confirm a decompression palpating superiorly, inferiorly, and to the left and right.   Another series of 38 -mm plate was found to fit the construct nicely. This was placed over the vertebral column and gently contoured to fit the spine. An awl was then centered over the C5 vertebra and fluoroscopy confirmed good entry point.   The C-arm fluoroscopy was then irrigated and closed with an interrupted inverted 4-0 and 5-0 Vicryl suture. The skin was closed with Dermabond. The drain was covered with a Bio-patch and covered with a Tegaderm. There were no complications. All counts were reported correct.   The

## 2021-02-09 NOTE — ANESTHESIA POSTPROCEDURE EVALUATION
Patient: Yaz Chew    Procedure Summary     Date: 02/09/21 Room / Location: [facility] OR [address] [place] [address] OR    Anesthesia Start: 5857 Anesthesia Stop:     Procedure: ANTERIOR CERVICAL FUSION BG & INST 2 LEVEL (N/A Neck) Diagnosis: (other spondylosis wit

## 2021-02-09 NOTE — PROGRESS NOTES
[facility]    Progress Note    Ady Guan Patient Status:  Inpatient    1955 MRN [id number]   Location [address] UNIT Attending Pepito Grady MD   Hosp Day # 0 PCP DO Dominic Schuster Osteocel Pro.  3.  Anterior cervical instrumentation, C5, C6, C7 with a mini-Helix plate. PAIN CONTROL, MONITOR WOUND AND DRAIN, DVT PROPHYLAXIS, PT/POT.        Type 2 diabetes mellitus without complication, without long-term current use of insulin ([address]

## 2021-02-09 NOTE — BRIEF OP NOTE
Pre-Operative Diagnosis: other spondylosis with radiculopathy, cervical region     Post-Operative Diagnosis: other spondylosis with radiculopathy, cervical region      Procedure Performed:   Procedure(s):  anterior cervical decompression and fusion C5-6 an

## 2021-02-09 NOTE — ANESTHESIA PREPROCEDURE EVALUATION
Anesthesia PreOp Note    HPI:     Carlos Meadows is a 77year old female who presents for preoperative consultation requested by: Alivia Guzman MD    Date of Surgery: 2/9/2021    Procedure(s):  ANTERIOR CERVICAL FUSION BG & INST 2 LEVEL  Indication: ot TS PO ONCE D, Disp: , Rfl:     •  Omeprazole 40 MG Oral Capsule Delayed Release, Take 1 capsule (40 mg total) by mouth daily.  Take 1 capsule by mouth daily before breakfast., Disp: 90 capsule, Rfl: 0    •  atorvastatin 10 MG Oral Tab, Take 1 tablet (10 mg organizations: Not on file        Relationship status: Not on file      Intimate partner violence        Fear of current or ex partner: Not on file        Emotionally abused: Not on file        Physically abused: Not on file        Forced sexual activity: Pulmonary - negative ROS    breath sounds clear to auscultation  Cardiovascular   Exercise tolerance: poor  (+) hypertension well controlled,     ECG reviewed  Rhythm: regular  Rate: normal  ROS comment: Nl stress test     Neuro/Psych    (+)  CVA,

## 2021-02-10 ENCOUNTER — APPOINTMENT (OUTPATIENT)
Dept: CT IMAGING | Facility: HOSPITAL | Age: 66
DRG: 473 | End: 2021-02-10
Attending: HOSPITALIST
Payer: OTHER MISCELLANEOUS

## 2021-02-10 LAB
ANION GAP SERPL CALC-SCNC: 2 MMOL/L (ref 0–18)
BUN BLD-MCNC: 13 MG/DL (ref 7–18)
BUN/CREAT SERPL: 26.5 (ref 10–20)
CALCIUM BLD-MCNC: 8.7 MG/DL (ref 8.5–10.1)
CHLORIDE SERPL-SCNC: 112 MMOL/L (ref 98–112)
CO2 SERPL-SCNC: 29 MMOL/L (ref 21–32)
CREAT BLD-MCNC: 0.49 MG/DL
EST. AVERAGE GLUCOSE BLD GHB EST-MCNC: 151 MG/DL (ref 68–126)
GLUCOSE BLD-MCNC: 129 MG/DL (ref 70–99)
GLUCOSE BLDC GLUCOMTR-MCNC: 126 MG/DL (ref 70–99)
GLUCOSE BLDC GLUCOMTR-MCNC: 133 MG/DL (ref 70–99)
GLUCOSE BLDC GLUCOMTR-MCNC: 143 MG/DL (ref 70–99)
GLUCOSE BLDC GLUCOMTR-MCNC: 156 MG/DL (ref 70–99)
GLUCOSE BLDC GLUCOMTR-MCNC: 156 MG/DL (ref 70–99)
GLUCOSE BLDC GLUCOMTR-MCNC: 161 MG/DL (ref 70–99)
HBA1C MFR BLD HPLC: 6.9 % (ref ?–5.7)
HCT VFR BLD AUTO: 34.2 %
HGB BLD-MCNC: 10.7 G/DL
OSMOLALITY SERPL CALC.SUM OF ELEC: 298 MOSM/KG (ref 275–295)
POTASSIUM SERPL-SCNC: 3.7 MMOL/L (ref 3.5–5.1)
PROCALCITONIN SERPL-MCNC: <0.02 NG/ML (ref ?–0.16)
SODIUM SERPL-SCNC: 143 MMOL/L (ref 136–145)

## 2021-02-10 PROCEDURE — 70450 CT HEAD/BRAIN W/O DYE: CPT | Performed by: HOSPITALIST

## 2021-02-10 PROCEDURE — 3008F BODY MASS INDEX DOCD: CPT | Performed by: HOSPITALIST

## 2021-02-10 PROCEDURE — 3078F DIAST BP <80 MM HG: CPT | Performed by: HOSPITALIST

## 2021-02-10 PROCEDURE — 3077F SYST BP >= 140 MM HG: CPT | Performed by: HOSPITALIST

## 2021-02-10 PROCEDURE — 99233 SBSQ HOSP IP/OBS HIGH 50: CPT | Performed by: HOSPITALIST

## 2021-02-10 RX ORDER — PANTOPRAZOLE SODIUM 40 MG/1
40 TABLET, DELAYED RELEASE ORAL
Status: DISCONTINUED | OUTPATIENT
Start: 2021-02-10 | End: 2021-02-12

## 2021-02-10 RX ORDER — ATORVASTATIN CALCIUM 10 MG/1
10 TABLET, FILM COATED ORAL DAILY
Status: DISCONTINUED | OUTPATIENT
Start: 2021-02-10 | End: 2021-02-12

## 2021-02-10 RX ORDER — POTASSIUM CHLORIDE 20 MEQ/1
40 TABLET, EXTENDED RELEASE ORAL ONCE
Status: COMPLETED | OUTPATIENT
Start: 2021-02-10 | End: 2021-02-10

## 2021-02-10 RX ORDER — NALOXONE HYDROCHLORIDE 0.4 MG/ML
INJECTION, SOLUTION INTRAMUSCULAR; INTRAVENOUS; SUBCUTANEOUS
Status: COMPLETED
Start: 2021-02-10 | End: 2021-02-10

## 2021-02-10 NOTE — OCCUPATIONAL THERAPY NOTE
OCCUPATIONAL THERAPY EVALUATION - INPATIENT      Room Number: 204/204-A  Evaluation Date: 2/10/2021  Type of Evaluation: Initial       Physician Order: IP Consult to Occupational Therapy  Reason for Therapy: ADL/IADL Dysfunction and Discharge Planning    O care/supervision  OT Device Recommendations: TBD    PLAN  OT Treatment Plan: ADL training; Compensatory technique education       OCCUPATIONAL THERAPY MEDICAL/SOCIAL HISTORY     Problem List   Principal Problem:    Cervical spinal stenosis  Active Problems: Bearing Restriction: None                PAIN ASSESSMENT  Rating: (Not quantified- LLE \"burning:)  Location: LLE  Management Techniques:  Activity promotion    ACTIVITY TOLERANCE  Pulse: 76        BP: 157/60  BP Location: Right arm  BP Method: Automatic  P Goals  on: 2021  Frequency:3-5x week    Mookie Gene Aper, OTR/L   [address]

## 2021-02-10 NOTE — PROGRESS NOTES
[facility]    Progress Note    Carlos Meadows Patient Status:  Inpatient    1955 MRN [id number]   Location [facility] 2W/SW Attending Lois Macario MD   Hosp Day # 1 PCP Stacy Willett DO       Subjective:   Carlos Meadows i Fleet Enema, ondansetron HCl, Prochlorperazine Edisylate, diphenhydrAMINE **OR** diphenhydrAMINE HCl, acetaminophen **OR** HYDROcodone-acetaminophen **OR** HYDROcodone-acetaminophen, HYDROmorphone HCl **OR** HYDROmorphone HCl **OR** HYDROmorphone HCl, kylee  Anterior cervical instrumentation, C5, C6, C7 with a mini-Helix plate.   PAIN CONTROL, MONITOR WOUND AND DRAIN, DVT PROPHYLAXIS, PT/POT.      Type 2 diabetes mellitus without complication, without long-term current use of insulin (HCC)  CONT HOME MEDS, MON

## 2021-02-10 NOTE — PROGRESS NOTES
Skin assessment with second RN, Karrie Paula. Skin intact. Incision to Lower Neck with dermabond C/D/I with MAGALY drain to L chest intact and draining to bulb suction.        Jessica [id number]

## 2021-02-10 NOTE — PLAN OF CARE
Received pt from CT, Upon assessment, pt is A/Ox4, drowsy but easily aroused, delayed response to verbal but d/t drowsiness. CT result came back negative, Dr Akash Umanzor made aware. Marcel draining to bulb suction with minimal output.  PRN dilaudid given x neck pain w Progressing  Goal: Maintains or returns to baseline bowel function  Description: INTERVENTIONS:  - Assess bowel function  - Maintain adequate hydration with IV or PO as ordered and tolerated  - Evaluate effectiveness of GI medications  - Encourage mobiliza Problem: PAIN - ADULT  Goal: Verbalizes/displays adequate comfort level or patient's stated pain goal  Description: INTERVENTIONS:  - Encourage pt to monitor pain and request assistance  - Assess pain using appropriate pain scale  - Administer analgesics appropriate  - Identify discharge learning needs (meds, wound care, etc)  - Arrange for interpreters to assist at discharge as needed  - Consider post-discharge preferences of patient/family/discharge partner  - Complete POLST form as appropriate  - Assess

## 2021-02-10 NOTE — CM/SW NOTE
Received MDO for PHQ4    Met with patient at the bedside to discuss Home Eval and PHQ4. Pt reports she lives alone and was independent with all ADLs/IADLs prior to admission.   Plan is for her daughter to stay with her for about a week once she is discha

## 2021-02-10 NOTE — PLAN OF CARE
RRT    *See RRT Documentation Record*    Reason the RRT was called: Patient had mental status change,unable to speak ,gagging, and staring in space at 2.05am..   Assessment of patient leading up to RRT: Neuro  Interventions/Testing: suction,Narcan ,CT scan

## 2021-02-10 NOTE — DOWNTIME EVENT NOTE
The EMR was down for 2 hours on 2/10/2021. Sharad Barreto was responsible for completing the paper charting during this time period.      The following information was re-entered into the system by Fawad BYRNES: Flowsheet data    The following informati

## 2021-02-10 NOTE — PROGRESS NOTES
Ms. Anuj Peacock is resting comfortably but feels uncomfortable because of aching and soreness in the neck, primarily on the left side. She had an episode of staring yesterday and had a normal head CT.   This has resolved, and may have been related to receiving the surgery and findings, and MsSamuel Becky Trinh had a couple questions that were answered to her satisfaction. She understands what we discussed.

## 2021-02-10 NOTE — SIGNIFICANT EVENT
ICU nocturnist.    RRT called to room 436. Patient lying in bed minimally responsive, eyes open however unable to answer questions appropriately.   As per RN patient had been alert oriented talking however rather abruptly started to experience staring ep

## 2021-02-10 NOTE — PLAN OF CARE
Lower Neck incision c/d/i without complications. Pain med PRN. Scheduled Xanaflex. MAGALY drain emptied q8h. Patient aware of transfer to the floor, report given to [place] [facility] [place]. PT recommending 24 hour supervision. Daughter at bedside, updated.      Problem: Patient Evaluate effectiveness of GI medications  - Encourage mobilization and activity  - Obtain nutritional consult as needed  - Establish a toileting routine/schedule  - Consider collaborating with pharmacy to review patient's medication profile  Outcome: Progr pain using appropriate pain scale  - Administer analgesics based on type and severity of pain and evaluate response  - Implement non-pharmacological measures as appropriate and evaluate response  - Consider cultural and social influences on pain and pain m partner  - Complete POLST form as appropriate  - Assess patient's ability to be responsible for managing their own health  - Refer to Case Management Department for coordinating discharge planning if the patient needs post-hospital services based on physic

## 2021-02-10 NOTE — PLAN OF CARE
Patient is drowsy but arousable and oriented X3. On two liters of O2. Patient is voiding via the purewick. Patient is rolling side to side for activity. MAGALY drain in place and draining. Dressing CDI. Dysphagia swallow passed. Accu checks AC and HS.  Diluadid Short Term Goal: rest and less pain. Interventions:   - Promote rest  -manage pain.    - See additional Care Plan goals for specific interventions  Outcome: Progressing     Problem: GASTROINTESTINAL - ADULT  Goal: Minimal or absence of nausea and vomitin without S/S of infection  Description: INTERVENTIONS:  - Assess and document risk factors for pressure ulcer development  - Assess and document skin integrity  - Assess and document dressing/incision, wound bed, drain sites and surrounding tissue  - Implem on patient safety including physical limitations  - Instruct pt to call for assistance with activity based on assessment  - Modify environment to reduce risk of injury  - Provide assistive devices as appropriate  - Consider OT/PT consult to assist with str

## 2021-02-10 NOTE — PHYSICAL THERAPY NOTE
PHYSICAL THERAPY EVALUATION - INPATIENT     Room Number: 204/204-A  Evaluation Date: 2/10/2021  Type of Evaluation: Initial        Presenting Problem: (cervical spine)  Reason for Therapy: Mobility Dysfunction and Discharge Planning    PHYSICAL THERAPY AS Diagnosis Date   • Back problem    • Diabetes ([facility]    • Esophageal reflux    • Gastritis    • High blood pressure    • High cholesterol    • Osteoarthritis    • Psoriasis    • Stroke ([facility]     \"small stroke\" many years ago   • Visual impairment     re the bed?: A Little   How much help from another person does the patient currently need. ..   -   Moving to and from a bed to a chair (including a wheelchair)?: A Little   -   Need to walk in hospital room?: A Little   -   Climbing 3-5 steps with a railing?:

## 2021-02-11 LAB
ANION GAP SERPL CALC-SCNC: 2 MMOL/L (ref 0–18)
BUN BLD-MCNC: 11 MG/DL (ref 7–18)
BUN/CREAT SERPL: 18.3 (ref 10–20)
CALCIUM BLD-MCNC: 9.1 MG/DL (ref 8.5–10.1)
CHLORIDE SERPL-SCNC: 107 MMOL/L (ref 98–112)
CO2 SERPL-SCNC: 32 MMOL/L (ref 21–32)
CREAT BLD-MCNC: 0.6 MG/DL
GLUCOSE BLD-MCNC: 144 MG/DL (ref 70–99)
GLUCOSE BLDC GLUCOMTR-MCNC: 132 MG/DL (ref 70–99)
GLUCOSE BLDC GLUCOMTR-MCNC: 179 MG/DL (ref 70–99)
GLUCOSE BLDC GLUCOMTR-MCNC: 183 MG/DL (ref 70–99)
HCT VFR BLD AUTO: 32.1 %
HGB BLD-MCNC: 10.3 G/DL
OSMOLALITY SERPL CALC.SUM OF ELEC: 294 MOSM/KG (ref 275–295)
POTASSIUM SERPL-SCNC: 3.8 MMOL/L (ref 3.5–5.1)
POTASSIUM SERPL-SCNC: 3.8 MMOL/L (ref 3.5–5.1)
SODIUM SERPL-SCNC: 141 MMOL/L (ref 136–145)

## 2021-02-11 PROCEDURE — 99232 SBSQ HOSP IP/OBS MODERATE 35: CPT | Performed by: HOSPITALIST

## 2021-02-11 RX ORDER — TRAMADOL HYDROCHLORIDE 50 MG/1
50 TABLET ORAL EVERY 6 HOURS PRN
Status: DISCONTINUED | OUTPATIENT
Start: 2021-02-11 | End: 2021-02-12

## 2021-02-11 NOTE — PROGRESS NOTES
[facility]    Progress Note    Brad Prado Patient Status:  Inpatient    1955 MRN [id number]   Location [facility] 2W/SW Attending Dusty Saucedo MD   [facility] Day # 2 PCP Barb Parkinson DO       Subjective:   Brad Prado i Inpatient Meds:      traMADol HCl, PEG 3350, magnesium hydroxide, bisacodyl, Fleet Enema, Prochlorperazine Edisylate, diphenhydrAMINE **OR** diphenhydrAMINE HCl, acetaminophen **OR** [DISCONTINUED] HYDROcodone-acetaminophen **OR** [DISCONTINUED] HYDROcodon roots.  2.  Anterior cervical arthrodesis, C5-6 and C6-7 with titanium lordotic cages and Osteocel Pro.  3.  Anterior cervical instrumentation, C5, C6, C7 with a mini-Helix plate.   · Stop Dilaudid and Norco.  Will use low-dose tramadol and alternate with T

## 2021-02-11 NOTE — PROGRESS NOTES
S:  Patient complaining of pain and soreness in posterior neck, controlled with medication. Also notes some difficulty swallowing at times secondary to throat pain.       O:   02/11/21  0321   BP: 142/67   Pulse: 70   Resp: 18   Temp: 98.4 °F (36.9 °C)

## 2021-02-11 NOTE — PHYSICAL THERAPY NOTE
PHYSICAL THERAPY TREATMENT NOTE - INPATIENT     Room Number: 423/423-A       Presenting Problem: (cervical spine)    Problem List  Principal Problem:    Cervical spinal stenosis  Active Problems:    Type 2 diabetes mellitus without complication, without lo Static Sitting: Good  Dynamic Sitting: Fair +           Static Standing: Fair -  Dynamic Standing: Fair -    ACTIVITY TOLERANCE           BP: 97/ Status CGA with the RW   Goal #3 Patient is able to ambulate 150 feet with assist device: walker - rolling at assistance level: modified independent   Goal #3   Current Status 300' with the RW CGA   Goal #4 Patient will negotiate 3 stairs/one curb w/ aneta

## 2021-02-11 NOTE — OCCUPATIONAL THERAPY NOTE
OCCUPATIONAL THERAPY TREATMENT NOTE - INPATIENT    Room Number: 423/423-A               Problem List  Principal Problem:    Cervical spinal stenosis  Active Problems:    Type 2 diabetes mellitus without complication, without long-term current use of insuli rate  Location: (neck)  Management Techniques: Relaxation;Repositioning     ACTIVITY TOLERANCE                         O2 SATURATIONS                ACTIVITIES OF DAILY LIVING ASSESSMENT  AM-PAC ‘6-Clicks’ Inpatient Daily Activity Short Form  How much help Patient will complete self care task at sink level with SBA  Comment: goal met    Patient will independently recall spine precautions and incorporate independently into daily activities   Comment: pt able to perform dressing with cues for spine precautions

## 2021-02-11 NOTE — PLAN OF CARE
ARIC IS A/O X4, V/S STABLE, ON RA. PAIN MANAGED WITH PRN TYLENOL, SCHED ZANAFLEX, AND LOZENGES. BEDFAST THIS EVENING. MAGALY DRAIN IN PLACE, MINIMAL OUTPUT THIS EVENING. EVENING ACCUCHECK 133, NO COVERAGE. [address] [facility].  DISCHARGE PLAN TO BE vomiting  Description: INTERVENTIONS:  - Maintain adequate hydration with IV or PO as ordered and tolerated  - Nasogastric tube to low intermittent suction as ordered  - Evaluate effectiveness of ordered antiemetic medications  - Provide nonpharmacologic c tissue  - Implement wound care per orders  - Initiate isolation precautions as appropriate  - Initiate Pressure Ulcer prevention bundle as indicated  Outcome: Progressing  Goal: Oral mucous membranes remain intact  Description: INTERVENTIONS  - Assess oral assist with strengthening/mobility  - Encourage toileting schedule  Outcome: Progressing     Problem: DISCHARGE PLANNING  Goal: Discharge to home or other facility with appropriate resources  Description: INTERVENTIONS:  - Identify barriers to discharge w/

## 2021-02-12 VITALS
RESPIRATION RATE: 18 BRPM | TEMPERATURE: 98 F | SYSTOLIC BLOOD PRESSURE: 124 MMHG | WEIGHT: 184 LBS | BODY MASS INDEX: 36.12 KG/M2 | HEART RATE: 75 BPM | OXYGEN SATURATION: 98 % | DIASTOLIC BLOOD PRESSURE: 58 MMHG | HEIGHT: 60 IN

## 2021-02-12 LAB
GLUCOSE BLDC GLUCOMTR-MCNC: 119 MG/DL (ref 70–99)
GLUCOSE BLDC GLUCOMTR-MCNC: 132 MG/DL (ref 70–99)

## 2021-02-12 PROCEDURE — 99239 HOSP IP/OBS DSCHRG MGMT >30: CPT | Performed by: HOSPITALIST

## 2021-02-12 RX ORDER — ONDANSETRON 4 MG/1
4 TABLET, ORALLY DISINTEGRATING ORAL EVERY 8 HOURS PRN
Qty: 20 TABLET | Refills: 0 | Status: SHIPPED | OUTPATIENT
Start: 2021-02-12 | End: 2021-07-16 | Stop reason: ALTCHOICE

## 2021-02-12 RX ORDER — TRAMADOL HYDROCHLORIDE 50 MG/1
50 TABLET ORAL EVERY 6 HOURS PRN
Qty: 42 TABLET | Refills: 0 | Status: SHIPPED | OUTPATIENT
Start: 2021-02-12 | End: 2021-07-16 | Stop reason: ALTCHOICE

## 2021-02-12 NOTE — PHYSICAL THERAPY NOTE
PHYSICAL THERAPY TREATMENT NOTE - INPATIENT     Room Number: 423/423-A       Presenting Problem: (cervical spine)    Problem List  Principal Problem:    Cervical spinal stenosis  Active Problems:    Type 2 diabetes mellitus without complication, without lo Bearing Restriction: None                PAIN ASSESSMENT   Rating: (did not rate)  Location: neck/back of head  Management Techniques: Activity promotion; Body mechanics; Relaxation;Repositioning    BALANCE Sit to/from Stand at assistance level: minimum assistance with walker - rolling     Goal #2  Current Status SBA with the RW   Goal #3 Patient is able to ambulate 150 feet with assist device: walker - rolling at assistance level: modified independent   Goal

## 2021-02-12 NOTE — PLAN OF CARE
A/ox4. Hypotensive post therapy today, rec'd 500 ml NS bolus, vitals stable post bolus. Pain meds Ultram, tylenol & lozenges.   Problem: Patient Centered Care  Goal: Patient preferences are identified and integrated in the patient's plan of care  Descriptio comfort measures as appropriate  - Advance diet as tolerated, if ordered  - Obtain nutritional consult as needed  - Evaluate fluid balance  Outcome: Progressing  Goal: Maintains or returns to baseline bowel function  Description: INTERVENTIONS:  - Assess b severity of pain and evaluate response  - Implement non-pharmacological measures as appropriate and evaluate response  - Consider cultural and social influences on pain and pain management  - Manage/alleviate anxiety  - Utilize distraction and/or relaxatio ability or social support system  Outcome: Progressing

## 2021-02-12 NOTE — DISCHARGE SUMMARY
[facility] Discharge Summary   Patient ID:  Belem Pradhan  [id number]  77year old  9/5/1955    Admit date: 2/9/2021  Discharge date: 2/12/2021  Primary Care Physician: Lauryn Melissa DO   Attending Physician: Conrado Thurston MD   Consults:   Pretty Quinteros findings.    Dictated by (CST): Ai Domínguez MD on 2/10/2021 at 6:33 AM     Finalized by (CST): Ai Domínguez MD on 2/10/2021 at 6:35 AM          Xr Fluoroscopy C-arm Time <1 Hour  (cpt=76000)    Result Date: 2/9/2021  CONCLUSION: Intraoperative exam. Please MD  Hospitalist  2/12/2021

## 2021-02-12 NOTE — PLAN OF CARE
Patient axo4, Macedonian speaking. Sitting on couch, denies pain, feels better today. Patient RA, s1 s2. Patient is voiding. Patient is passing gas. Patient has remained free from falls throughout stay. Hourly rounding maintained.   Pt's bed in [facility] for specific interventions  Outcome: Progressing     Problem: GASTROINTESTINAL - ADULT  Goal: Minimal or absence of nausea and vomiting  Description: INTERVENTIONS:  - Maintain adequate hydration with IV or PO as ordered and tolerated  - Nasogastric tube t development  - Assess and document skin integrity  - Assess and document dressing/incision, wound bed, drain sites and surrounding tissue  - Implement wound care per orders  - Initiate isolation precautions as appropriate  - Initiate Pressure Ulcer prevent assessment  - Modify environment to reduce risk of injury  - Provide assistive devices as appropriate  - Consider OT/PT consult to assist with strengthening/mobility  - Encourage toileting schedule  Outcome: Progressing     Problem: [address]

## 2021-02-12 NOTE — PLAN OF CARE
Problem: Patient Centered Care  Goal: Patient preferences are identified and integrated in the patient's plan of care  Description: Interventions:  - What would you like us to know as we care for you?  PRIMARILY Turkish SPEAKING  - Provide timely, complet tolerated, if ordered  - Obtain nutritional consult as needed  - Evaluate fluid balance  Outcome: Progressing  Goal: Maintains or returns to baseline bowel function  Description: INTERVENTIONS:  - Assess bowel function  - Maintain adequate hydration with I oral hygiene regimen  - Implement oral medicated treatments as ordered  Outcome: Progressing     Problem: PAIN - ADULT  Goal: Verbalizes/displays adequate comfort level or patient's stated pain goal  Description: INTERVENTIONS:  - Encourage pt to monitor p partner in discharge planning  - Arrange for needed discharge resources and transportation as appropriate  - Identify discharge learning needs (meds, wound care, etc)  - Arrange for interpreters to assist at discharge as needed  - Consider post-discharge p

## 2021-02-12 NOTE — PROGRESS NOTES
Ms. Wolf Shen is awake and alert and is sitting up in the chair. She complains of incisional pain. She has no pain in her arms. She has been ambulating to the bathroom.          02/12/21  0428   BP: 134/60   Pulse: 77   Resp: 18   Temp: 98.7 °F (37.1 °C)

## 2021-02-15 ENCOUNTER — PATIENT OUTREACH (OUTPATIENT)
Dept: CASE MANAGEMENT | Age: 66
End: 2021-02-15

## 2021-02-16 NOTE — PROGRESS NOTES
Using the  service, attempt to reach pt for TCM.  ID #: [id number]. Left message on mailbox for pt to call NCM back for TCM. [facility] contact information included in message.

## 2021-02-19 NOTE — PROGRESS NOTES
NCM attempted to contact the patient for HFU. Phone rang a few times and appeared to be answered. NCM attempted to introduce self and phone disconnected. NCM will try again another time.

## 2021-03-30 ENCOUNTER — HOSPITAL ENCOUNTER (OUTPATIENT)
Dept: GENERAL RADIOLOGY | Facility: HOSPITAL | Age: 66
Discharge: HOME | End: 2021-03-30
Attending: NEUROLOGICAL SURGERY
Payer: OTHER MISCELLANEOUS

## 2021-03-30 DIAGNOSIS — M47.22 OTHER SPONDYLOSIS WITH RADICULOPATHY, CERVICAL REGION: ICD-10-CM

## 2021-03-30 PROCEDURE — 72040 X-RAY EXAM NECK SPINE 2-3 VW: CPT | Performed by: NEUROLOGICAL SURGERY

## 2021-06-06 ENCOUNTER — APPOINTMENT (OUTPATIENT)
Dept: CT IMAGING | Facility: HOSPITAL | Age: 66
End: 2021-06-06
Attending: EMERGENCY MEDICINE
Payer: MEDICARE

## 2021-06-06 ENCOUNTER — HOSPITAL ENCOUNTER (EMERGENCY)
Facility: HOSPITAL | Age: 66
Discharge: HOME | End: 2021-06-06
Attending: EMERGENCY MEDICINE
Payer: MEDICARE

## 2021-06-06 VITALS
SYSTOLIC BLOOD PRESSURE: 139 MMHG | TEMPERATURE: 98 F | DIASTOLIC BLOOD PRESSURE: 56 MMHG | WEIGHT: 184 LBS | HEART RATE: 63 BPM | HEIGHT: 60 IN | RESPIRATION RATE: 18 BRPM | OXYGEN SATURATION: 97 % | BODY MASS INDEX: 36.12 KG/M2

## 2021-06-06 DIAGNOSIS — S32.010A COMPRESSION FRACTURE OF L1 VERTEBRA, INITIAL ENCOUNTER (HCC): Primary | ICD-10-CM

## 2021-06-06 PROCEDURE — 85025 COMPLETE CBC W/AUTO DIFF WBC: CPT | Performed by: EMERGENCY MEDICINE

## 2021-06-06 PROCEDURE — 99284 EMERGENCY DEPT VISIT MOD MDM: CPT

## 2021-06-06 PROCEDURE — 74177 CT ABD & PELVIS W/CONTRAST: CPT | Performed by: EMERGENCY MEDICINE

## 2021-06-06 PROCEDURE — 96374 THER/PROPH/DIAG INJ IV PUSH: CPT

## 2021-06-06 PROCEDURE — 83690 ASSAY OF LIPASE: CPT | Performed by: EMERGENCY MEDICINE

## 2021-06-06 PROCEDURE — 81001 URINALYSIS AUTO W/SCOPE: CPT | Performed by: EMERGENCY MEDICINE

## 2021-06-06 PROCEDURE — 80048 BASIC METABOLIC PNL TOTAL CA: CPT | Performed by: EMERGENCY MEDICINE

## 2021-06-06 PROCEDURE — 80076 HEPATIC FUNCTION PANEL: CPT | Performed by: EMERGENCY MEDICINE

## 2021-06-06 RX ORDER — KETOROLAC TROMETHAMINE 15 MG/ML
15 INJECTION, SOLUTION INTRAMUSCULAR; INTRAVENOUS ONCE
Status: COMPLETED | OUTPATIENT
Start: 2021-06-06 | End: 2021-06-06

## 2021-06-06 RX ORDER — TRAMADOL HYDROCHLORIDE 50 MG/1
TABLET ORAL EVERY 4 HOURS PRN
Qty: 20 TABLET | Refills: 0 | Status: SHIPPED | OUTPATIENT
Start: 2021-06-06 | End: 2021-07-16 | Stop reason: ALTCHOICE

## 2021-06-06 NOTE — ED INITIAL ASSESSMENT (HPI)
Patient arrives with her daughter who reports lower back pain for about a week. They assumed she hurt her back while doing physical therapy for her right knee and status post neck fusion.    She then started having constipation --daughter gave her mag citra

## 2021-06-06 NOTE — ED PROVIDER NOTES
Patient Seen in: [facility] AND [facility] Emergency Department      History   Patient presents with:  Pain  Constipation  Vomiting    Stated Complaint: lower back pain, vomiting, constipation    HPI/Subjective:   HPI    64-year-old female with history of hypert [06/06/21 0948]   /82   Pulse 69   Resp 18   Temp 98 °F (36.7 °C)   Temp src Oral   SpO2 98 %   O2 Device None (Room air)       Current:/82   Pulse 69   Temp 98 °F (36.7 °C) (Oral)   Resp 18   Ht 152.4 cm (5')   Wt 83.5 kg   SpO2 98%   BMI 35. 9 Notable for the following components:    HGB 11.2 (*)     All other components within normal limits   CBC WITH DIFFERENTIAL WITH PLATELET    Narrative: The following orders were created for panel order CBC With Differential With Platelet.   Procedure ( mg total) by mouth every 4 (four) hours as needed for Pain. Qty: 20 tablet Refills: 0    !! - Potential duplicate medications found. Please discuss with provider.

## 2021-06-07 NOTE — CM/SW NOTE
Per request of Dr. Enoc Langston pt needs sooner appointment with Beninese speaking MD.  CM contacted office and informed by  of Dr. Wagner Pettit is retired. In addition, Dr. Del Rio Kate Dr. Abhijit Dupree are on vacation.   However, pt will be contacted to give ap

## 2021-06-08 ENCOUNTER — HOSPITAL ENCOUNTER (OUTPATIENT)
Dept: GENERAL RADIOLOGY | Facility: HOSPITAL | Age: 66
Discharge: HOME | End: 2021-06-08
Attending: PHYSICIAN ASSISTANT
Payer: OTHER MISCELLANEOUS

## 2021-06-08 DIAGNOSIS — M47.22 OTHER SPONDYLOSIS WITH RADICULOPATHY, CERVICAL REGION: ICD-10-CM

## 2021-06-08 PROCEDURE — 72040 X-RAY EXAM NECK SPINE 2-3 VW: CPT | Performed by: PHYSICIAN ASSISTANT

## 2021-06-09 ENCOUNTER — OFFICE VISIT (OUTPATIENT)
Dept: INTERNAL MEDICINE CLINIC | Facility: CLINIC | Age: 66
End: 2021-06-09
Payer: MEDICARE

## 2021-06-09 VITALS
DIASTOLIC BLOOD PRESSURE: 88 MMHG | HEART RATE: 79 BPM | OXYGEN SATURATION: 97 % | WEIGHT: 189 LBS | SYSTOLIC BLOOD PRESSURE: 130 MMHG | HEIGHT: 60 IN | BODY MASS INDEX: 37.11 KG/M2

## 2021-06-09 DIAGNOSIS — D64.9 ANEMIA, UNSPECIFIED TYPE: ICD-10-CM

## 2021-06-09 DIAGNOSIS — E11.9 TYPE 2 DIABETES MELLITUS WITHOUT COMPLICATION, WITHOUT LONG-TERM CURRENT USE OF INSULIN (HCC): ICD-10-CM

## 2021-06-09 DIAGNOSIS — D17.71 ANGIOMYOLIPOMA OF KIDNEY: ICD-10-CM

## 2021-06-09 DIAGNOSIS — M43.9 COMPRESSION DEFORMITY OF VERTEBRA: Primary | ICD-10-CM

## 2021-06-09 PROCEDURE — 82728 ASSAY OF FERRITIN: CPT | Performed by: FAMILY MEDICINE

## 2021-06-09 PROCEDURE — 83540 ASSAY OF IRON: CPT | Performed by: FAMILY MEDICINE

## 2021-06-09 PROCEDURE — 99214 OFFICE O/P EST MOD 30 MIN: CPT | Performed by: FAMILY MEDICINE

## 2021-06-09 PROCEDURE — 84466 ASSAY OF TRANSFERRIN: CPT | Performed by: FAMILY MEDICINE

## 2021-06-09 PROCEDURE — 1111F DSCHRG MED/CURRENT MED MERGE: CPT | Performed by: FAMILY MEDICINE

## 2021-06-09 PROCEDURE — 83036 HEMOGLOBIN GLYCOSYLATED A1C: CPT | Performed by: FAMILY MEDICINE

## 2021-06-09 RX ORDER — TIZANIDINE 4 MG/1
TABLET ORAL
COMMUNITY
Start: 2021-05-03 | End: 2021-07-16 | Stop reason: ALTCHOICE

## 2021-06-13 NOTE — PROGRESS NOTES
PATIENT IDENTIFICATION  Name: Bea Rosario  MRN: [id number]    Diagnoses:   Compression deformity of vertebra  (primary encounter diagnosis)  Angiomyolipoma of kidney  Anemia, unspecified type  Type 2 diabetes mellitus without complication, without long Tab TAKE 1 TABLET BY MOUTH THREE TIMES DAILY AS NEEDED FOR MUSCLE RELAXANT     • traMADol HCl 50 MG Oral Tab Take 1-2 tablets ( mg total) by mouth every 4 (four) hours as needed for Pain.  20 tablet 0   • ondansetron (ZOFRAN ODT) 4 MG Oral Tablet Disp precautions and endorsed understanding.      Mario Penaloza MD      Encounter Times  PreCharting: 10 minutes    Reviewing/Obtaining: 15 minutes      Medical Exam:   minutes    Plan:   minutes      Notes: 3 minutes    Counseling/Education: 5 minutes      Referr

## 2021-07-09 ENCOUNTER — OFFICE VISIT (OUTPATIENT)
Dept: INTERNAL MEDICINE CLINIC | Facility: CLINIC | Age: 66
End: 2021-07-09
Payer: MEDICARE

## 2021-07-09 VITALS
WEIGHT: 189 LBS | SYSTOLIC BLOOD PRESSURE: 120 MMHG | HEART RATE: 64 BPM | RESPIRATION RATE: 17 BRPM | OXYGEN SATURATION: 99 % | HEIGHT: 60 IN | DIASTOLIC BLOOD PRESSURE: 86 MMHG | BODY MASS INDEX: 37.11 KG/M2

## 2021-07-09 DIAGNOSIS — M54.12 CERVICAL RADICULOPATHY: Primary | ICD-10-CM

## 2021-07-09 PROCEDURE — 99213 OFFICE O/P EST LOW 20 MIN: CPT | Performed by: FAMILY MEDICINE

## 2021-07-09 RX ORDER — IBUPROFEN 800 MG/1
800 TABLET ORAL EVERY 8 HOURS PRN
Qty: 40 TABLET | Refills: 1 | Status: SHIPPED | OUTPATIENT
Start: 2021-07-09

## 2021-07-09 NOTE — PROGRESS NOTES
PATIENT IDENTIFICATION  Name: Oliverio Blanton  MRN: [id number]    Diagnoses:   Cervical radiculopathy  (primary encounter diagnosis)    SUBJECTIVE:  Oliverio Blanton is a 77year old female who presents for f/u for arm pain.       Still having significant ri Outpatient Medications   Medication Sig Dispense Refill   • ibuprofen 800 MG Oral Tab Take 1 tablet (800 mg total) by mouth every 8 (eight) hours as needed for Pain.  40 tablet 1   • tiZANidine HCl 4 MG Oral Tab TAKE 1 TABLET BY MOUTH THREE TIMES DAILY AS N

## 2021-07-16 ENCOUNTER — OFFICE VISIT (OUTPATIENT)
Dept: HEMATOLOGY/ONCOLOGY | Facility: HOSPITAL | Age: 66
End: 2021-07-16
Attending: INTERNAL MEDICINE
Payer: MEDICARE

## 2021-07-16 VITALS
RESPIRATION RATE: 18 BRPM | HEIGHT: 59 IN | DIASTOLIC BLOOD PRESSURE: 51 MMHG | OXYGEN SATURATION: 99 % | HEART RATE: 64 BPM | SYSTOLIC BLOOD PRESSURE: 120 MMHG | BODY MASS INDEX: 39.11 KG/M2 | WEIGHT: 194 LBS | TEMPERATURE: 97 F

## 2021-07-16 DIAGNOSIS — D64.9 NORMOCYTIC ANEMIA: Primary | ICD-10-CM

## 2021-07-16 DIAGNOSIS — D64.9 NORMOCYTIC ANEMIA: ICD-10-CM

## 2021-07-16 DIAGNOSIS — D50.8 OTHER IRON DEFICIENCY ANEMIA: ICD-10-CM

## 2021-07-16 DIAGNOSIS — E53.8 FOLIC ACID DEFICIENCY: ICD-10-CM

## 2021-07-16 LAB
BASOPHILS # BLD AUTO: 0.05 X10(3) UL (ref 0–0.2)
BASOPHILS NFR BLD AUTO: 0.6 %
DEPRECATED HBV CORE AB SER IA-ACNC: 32.6 NG/ML
DEPRECATED RDW RBC AUTO: 44.3 FL (ref 35.1–46.3)
EOSINOPHIL # BLD AUTO: 0.28 X10(3) UL (ref 0–0.7)
EOSINOPHIL NFR BLD AUTO: 3.6 %
ERYTHROCYTE [DISTWIDTH] IN BLOOD BY AUTOMATED COUNT: 13.4 % (ref 11–15)
FOLATE SERPL-MCNC: 7.1 NG/ML (ref 8.7–?)
HAPTOGLOB SERPL-MCNC: 197 MG/DL (ref 30–200)
HCT VFR BLD AUTO: 35.1 %
HGB BLD-MCNC: 11.2 G/DL
HGB RETIC QN AUTO: 31.9 PG (ref 28.2–36.6)
IMM GRANULOCYTES # BLD AUTO: 0.03 X10(3) UL (ref 0–1)
IMM GRANULOCYTES NFR BLD: 0.4 %
IMM RETICS NFR: 0.07 RATIO (ref 0.1–0.3)
IRON SATURATION: 15 %
IRON SERPL-MCNC: 53 UG/DL
LDH SERPL L TO P-CCNC: 204 U/L
LYMPHOCYTES # BLD AUTO: 2.49 X10(3) UL (ref 1–4)
LYMPHOCYTES NFR BLD AUTO: 32 %
MCH RBC QN AUTO: 28.6 PG (ref 26–34)
MCHC RBC AUTO-ENTMCNC: 31.9 G/DL (ref 31–37)
MCV RBC AUTO: 89.8 FL
MONOCYTES # BLD AUTO: 0.71 X10(3) UL (ref 0.1–1)
MONOCYTES NFR BLD AUTO: 9.1 %
NEUTROPHILS # BLD AUTO: 4.23 X10 (3) UL (ref 1.5–7.7)
NEUTROPHILS # BLD AUTO: 4.23 X10(3) UL (ref 1.5–7.7)
NEUTROPHILS NFR BLD AUTO: 54.3 %
PLATELET # BLD AUTO: 227 10(3)UL (ref 150–450)
RBC # BLD AUTO: 3.91 X10(6)UL
RETICS # AUTO: 50.4 X10(3) UL (ref 22.5–147.5)
RETICS/RBC NFR AUTO: 1.3 %
TOTAL IRON BINDING CAPACITY: 349 UG/DL (ref 240–450)
TRANSFERRIN SERPL-MCNC: 234 MG/DL (ref 200–360)
VIT B12 SERPL-MCNC: 420 PG/ML (ref 193–986)
WBC # BLD AUTO: 7.8 X10(3) UL (ref 4–11)

## 2021-07-16 PROCEDURE — 99204 OFFICE O/P NEW MOD 45 MIN: CPT | Performed by: INTERNAL MEDICINE

## 2021-07-16 RX ORDER — FOLIC ACID 1 MG/1
1 TABLET ORAL DAILY
Qty: 30 TABLET | Refills: 1 | Status: SHIPPED | OUTPATIENT
Start: 2021-07-16 | End: 2021-07-19

## 2021-07-16 RX ORDER — FERROUS SULFATE 325(65) MG
325 TABLET ORAL
Qty: 30 TABLET | Refills: 2 | Status: SHIPPED | OUTPATIENT
Start: 2021-07-16 | End: 2021-07-16 | Stop reason: CLARIF

## 2021-07-16 NOTE — PROGRESS NOTES
Hematology Consultation Note    Patient Name: Denisse Hartmann   YOB: 1955   Medical Record Number: [id number]   CSN: [id number]   Consulting Physician: Afshin Roman MD  Referring Physician(s):  Babak Lawrence MD  Date of Consultation: 7/16/2021 Social History Narrative      \"Dulia\" is windowed but has a long term boyfriend for the last 4 yrs. Mother of 2 adult children with 1 granddaughter. She formerly worked from [employer] in [place], [place]. Patient lives alone in [place], [place]. Signs:   /51 (BP Location: Left arm, Patient Position: Sitting, Cuff Size: large)   Pulse 64   Temp 97 °F (36.1 °C) (Temporal)   Resp 18   Ht 1.499 m (4' 11\")   Wt 88 kg (194 lb)   SpO2 99%   BMI 39.18 kg/m²     Physical Examination:    General: Joie  There is enlargement of the common bile duct at 16 mm.  These findings are similar to prior imaging. SPLEEN: No enlargement or focal lesion.     STOMACH: Small hiatal hernia.  No gastric obstruction.  Duodenum is unremarkable.    PANCREAS: No lesion, flu throughout the large bowel without small bowel dilatation. This can be seen as a normal variant or with constipation.    3.  Post cholecystectomy with chronic intra and extrahepatic pneumobilia and biliary ductal dilatation.  This is likely a chronic findin consider  --not other causes of anemia noted, the pt RTC in 2 months for evaluation  --pt ALSO need folic acid replacement, so 1 mg/daily sent to her pharmacy  --as she has psoriasis as an autoimmune condition, she may have an anemia of chronic inflammatio

## 2021-07-17 ENCOUNTER — TELEPHONE (OUTPATIENT)
Dept: GASTROENTEROLOGY | Facility: CLINIC | Age: 66
End: 2021-07-17

## 2021-07-19 RX ORDER — FOLIC ACID 1 MG/1
TABLET ORAL
Qty: 90 TABLET | Refills: 0 | Status: SHIPPED | OUTPATIENT
Start: 2021-07-19 | End: 2021-12-27

## 2021-07-28 ENCOUNTER — HOSPITAL ENCOUNTER (OUTPATIENT)
Dept: GENERAL RADIOLOGY | Age: 66
Discharge: HOME | End: 2021-07-28
Attending: PHYSICAL MEDICINE & REHABILITATION
Payer: MEDICARE

## 2021-07-28 ENCOUNTER — OFFICE VISIT (OUTPATIENT)
Dept: NEUROLOGY | Facility: CLINIC | Age: 66
End: 2021-07-28
Payer: MEDICARE

## 2021-07-28 VITALS
DIASTOLIC BLOOD PRESSURE: 54 MMHG | OXYGEN SATURATION: 97 % | BODY MASS INDEX: 39.11 KG/M2 | HEIGHT: 59 IN | HEART RATE: 72 BPM | SYSTOLIC BLOOD PRESSURE: 126 MMHG | WEIGHT: 194 LBS

## 2021-07-28 DIAGNOSIS — K29.60 NSAID INDUCED GASTRITIS: ICD-10-CM

## 2021-07-28 DIAGNOSIS — M48.061 LUMBAR FORAMINAL STENOSIS: ICD-10-CM

## 2021-07-28 DIAGNOSIS — M51.37 DDD (DEGENERATIVE DISC DISEASE), LUMBOSACRAL: ICD-10-CM

## 2021-07-28 DIAGNOSIS — M47.816 FACET SYNDROME, LUMBAR: ICD-10-CM

## 2021-07-28 DIAGNOSIS — S76.019A STRAIN OF GLUTEUS MEDIUS, UNSPECIFIED LATERALITY, INITIAL ENCOUNTER: ICD-10-CM

## 2021-07-28 DIAGNOSIS — M17.11 PRIMARY OSTEOARTHRITIS OF RIGHT KNEE: ICD-10-CM

## 2021-07-28 DIAGNOSIS — E78.5 HYPERLIPIDEMIA, UNSPECIFIED HYPERLIPIDEMIA TYPE: ICD-10-CM

## 2021-07-28 DIAGNOSIS — M43.22 CERVICAL VERTEBRAL FUSION: ICD-10-CM

## 2021-07-28 DIAGNOSIS — E11.9 TYPE 2 DIABETES MELLITUS WITHOUT COMPLICATION, WITHOUT LONG-TERM CURRENT USE OF INSULIN (HCC): ICD-10-CM

## 2021-07-28 DIAGNOSIS — M51.26 BULGE OF LUMBAR DISC WITHOUT MYELOPATHY: ICD-10-CM

## 2021-07-28 DIAGNOSIS — E66.01 CLASS 2 SEVERE OBESITY DUE TO EXCESS CALORIES WITH SERIOUS COMORBIDITY AND BODY MASS INDEX (BMI) OF 39.0 TO 39.9 IN ADULT (HCC): ICD-10-CM

## 2021-07-28 DIAGNOSIS — M47.816 LUMBAR SPONDYLOSIS: ICD-10-CM

## 2021-07-28 DIAGNOSIS — I10 ESSENTIAL HYPERTENSION: Chronic | ICD-10-CM

## 2021-07-28 DIAGNOSIS — M54.59 LUMBAR TRIGGER POINT SYNDROME: Primary | ICD-10-CM

## 2021-07-28 DIAGNOSIS — M54.59 MECHANICAL LOW BACK PAIN: ICD-10-CM

## 2021-07-28 DIAGNOSIS — M51.16 LUMBAR DISC HERNIATION WITH RADICULOPATHY: ICD-10-CM

## 2021-07-28 DIAGNOSIS — T39.395A NSAID INDUCED GASTRITIS: ICD-10-CM

## 2021-07-28 DIAGNOSIS — M54.59 LUMBAR TRIGGER POINT SYNDROME: ICD-10-CM

## 2021-07-28 PROBLEM — M51.369 BULGE OF LUMBAR DISC WITHOUT MYELOPATHY: Status: ACTIVE | Noted: 2021-07-28

## 2021-07-28 PROBLEM — M51.36 BULGE OF LUMBAR DISC WITHOUT MYELOPATHY: Status: ACTIVE | Noted: 2021-07-28

## 2021-07-28 PROBLEM — M51.379 DDD (DEGENERATIVE DISC DISEASE), LUMBOSACRAL: Status: ACTIVE | Noted: 2021-07-28

## 2021-07-28 PROBLEM — E66.812 CLASS 2 SEVERE OBESITY DUE TO EXCESS CALORIES WITH SERIOUS COMORBIDITY AND BODY MASS INDEX (BMI) OF 39.0 TO 39.9 IN ADULT (HCC): Status: ACTIVE | Noted: 2021-07-28

## 2021-07-28 PROCEDURE — 73521 X-RAY EXAM HIPS BI 2 VIEWS: CPT | Performed by: PHYSICAL MEDICINE & REHABILITATION

## 2021-07-28 PROCEDURE — 72110 X-RAY EXAM L-2 SPINE 4/>VWS: CPT | Performed by: PHYSICAL MEDICINE & REHABILITATION

## 2021-07-28 PROCEDURE — 99204 OFFICE O/P NEW MOD 45 MIN: CPT | Performed by: PHYSICAL MEDICINE & REHABILITATION

## 2021-07-28 RX ORDER — NAPROXEN 500 MG/1
500 TABLET ORAL 2 TIMES DAILY WITH MEALS
Qty: 60 TABLET | Refills: 0 | Status: SHIPPED | OUTPATIENT
Start: 2021-07-28 | End: 2021-09-08

## 2021-07-28 NOTE — H&P
[address] H&P    Requesting Physician:  Leslie Bautista MD    Chief Complaint (Reason for Visit):  Patient presents with:  Low Back Pain: New right handed patient comes in for bilateral low back pain salena not work.     PAST MEDICAL HISTORY:   Past Medical History:   Diagnosis Date   • Chronic low back pain    • Diabetes ([facility] [address]    • Esophageal reflux    • Gastritis    • High blood pressure    • High cholesterol    • Osteoarthritis    • Psoriasis    • Stroke (H HCl  MG Oral Tablet 24 Hr TK 2 TS PO ONCE D     • atorvastatin 10 MG Oral Tab Take 1 tablet (10 mg total) by mouth daily. 30 tablet 0   • Omeprazole 40 MG Oral Capsule Delayed Release Take 1 capsule (40 mg total) by mouth daily.  Take 1 capsule by twyla joints, bilateral greater trochanter IT band  ROM: Full active range of motion of the lumbar spine  Motor: 5/5 in all myotomes of the BILATERAL lower extremities except 4 out of 5 with left-sided plantarflexion (S1)  Sensation: Intact to light touch in all 1.50 - 7.70 x10 (3) uL Final   • Neutrophil Absolute 07/16/2021 4.23  1.50 - 7.70 x10(3) uL Final   • Lymphocyte Absolute 07/16/2021 2.49  1.00 - 4.00 x10(3) uL Final   • Monocyte Absolute 07/16/2021 0.71  0.10 - 1.00 x10(3) uL Final   • Eosinophil Absolut 295 mOsm/kg Final   • GFR, Non- 06/06/2021 97  >=60 Final   • GFR, -American 06/06/2021 112  >=60 Final   • Urine Color 06/06/2021 Yellow  Yellow Final   • Clarity Urine 06/06/2021 Clear  Clear Final   • Spec Gravity 06/06/2021 1.014 0.00 - 1.00 x10(3) uL Final   • Neutrophil % 06/06/2021 54.8  % Final   • Lymphocyte % 06/06/2021 32.1  % Final   • Monocyte % 06/06/2021 9.7  % Final   • Eosinophil % 06/06/2021 2.7  % Final   • Basophil % 06/06/2021 0.4  % Final   • Immature Granulocyte Glucose  02/10/2021 143* 70 - 99 mg/dL Final   • POC Glucose  02/10/2021 126* 70 - 99 mg/dL Final   • POC Glucose  02/10/2021 161* 70 - 99 mg/dL Final   • POC Glucose  02/10/2021 156* 70 - 99 mg/dL Final   • POC Glucose  02/10/2021 133* 70 - 99 mg/dL Final [facility] (OTH=[id number]), 9/19/2020, 8:55 AM.       INDICATIONS: Lower back pain, vomiting, constipation       TECHNIQUE: CT images of the abdomen and pelvis were obtained with non-ionic intravenous contrast material.  Automated ex fat containing umbilical hernia.    URINARY BLADDER: No visible focal wall thickening, lesion or calculus.     PELVIC NODES: No enlarged mass or adenopathy.     PELVIC ORGANS: Anteverted uterus.  Nodular calcification in the ventral aspect of the uterus com of the lumbar spine and pelvis. I am also recommending she begin formal physical therapy and start Naprosyn twice a day for the next 2 weeks and then as needed. I have reviewed her most recent creatinine which is within normal limits.   I will follow up w

## 2021-07-28 NOTE — PATIENT INSTRUCTIONS
1) Get Xr of the lumbar spine and pelvis today on your way out  2) Take Naprosyn 500 mg 1 tablet twice per day with food for the next two weeks and then as needed but no more than 2 tablets per day.  Do not take with any other NSAIDS (Ibuprofen, Advil, Alev

## 2021-09-02 ENCOUNTER — TELEPHONE (OUTPATIENT)
Dept: INTERNAL MEDICINE CLINIC | Facility: CLINIC | Age: 66
End: 2021-09-02

## 2021-09-02 NOTE — TELEPHONE ENCOUNTER
Received request for Medical Records from:  [place] [employer] [place] [employer] [place] to be mailed to:  [facility] [place], [place]  [address] [facility] [address] [facility] [address]  Fax [phone]  Sent to [employer]

## 2021-09-08 ENCOUNTER — OFFICE VISIT (OUTPATIENT)
Dept: PHYSICAL MEDICINE AND REHAB | Facility: CLINIC | Age: 66
End: 2021-09-08
Payer: MEDICARE

## 2021-09-08 ENCOUNTER — HOSPITAL ENCOUNTER (OUTPATIENT)
Dept: GENERAL RADIOLOGY | Age: 66
Discharge: HOME | End: 2021-09-08
Attending: PHYSICAL MEDICINE & REHABILITATION
Payer: MEDICARE

## 2021-09-08 VITALS
BODY MASS INDEX: 37.79 KG/M2 | WEIGHT: 180 LBS | HEIGHT: 58 IN | DIASTOLIC BLOOD PRESSURE: 72 MMHG | HEART RATE: 70 BPM | SYSTOLIC BLOOD PRESSURE: 142 MMHG | OXYGEN SATURATION: 97 %

## 2021-09-08 DIAGNOSIS — M54.59 MECHANICAL LOW BACK PAIN: Primary | ICD-10-CM

## 2021-09-08 DIAGNOSIS — M54.59 LUMBAR TRIGGER POINT SYNDROME: ICD-10-CM

## 2021-09-08 DIAGNOSIS — M47.816 FACET SYNDROME, LUMBAR: ICD-10-CM

## 2021-09-08 DIAGNOSIS — M51.26 BULGE OF LUMBAR DISC WITHOUT MYELOPATHY: ICD-10-CM

## 2021-09-08 DIAGNOSIS — M48.061 LUMBAR FORAMINAL STENOSIS: ICD-10-CM

## 2021-09-08 DIAGNOSIS — M54.59 MECHANICAL LOW BACK PAIN: ICD-10-CM

## 2021-09-08 DIAGNOSIS — M43.16 SPONDYLOLISTHESIS OF LUMBAR REGION: ICD-10-CM

## 2021-09-08 DIAGNOSIS — M47.816 LUMBAR SPONDYLOSIS: ICD-10-CM

## 2021-09-08 PROCEDURE — 99214 OFFICE O/P EST MOD 30 MIN: CPT | Performed by: PHYSICAL MEDICINE & REHABILITATION

## 2021-09-08 PROCEDURE — 73630 X-RAY EXAM OF FOOT: CPT | Performed by: PHYSICAL MEDICINE & REHABILITATION

## 2021-09-08 RX ORDER — GABAPENTIN 100 MG/1
100 CAPSULE ORAL 3 TIMES DAILY
Qty: 90 CAPSULE | Refills: 0 | Status: SHIPPED | OUTPATIENT
Start: 2021-09-08

## 2021-09-08 NOTE — PROGRESS NOTES
[address]  Progress Note    CHIEF COMPLAINT:  Patient presents with:  Low Back Pain: LOV: 7/28/21 Patient f/u on bilateral low back pain that radiates to thoracic back pain also that radiates to both arms w tobacco: Never Used      Tobacco comment: 1-2 CIGARETTES PER DAY    Vaping Use      Vaping Use: Never used    Substance and Sexual Activity      Alcohol use: No        Alcohol/week: 0.0 standard drinks        Comment: socially      Drug use: Never      Sex positives and negatives noted in the HPI. PHYSICAL EXAM:   /72   Pulse 70   Ht 58\"   Wt 180 lb (81.6 kg)   SpO2 97%   BMI 37.62 kg/m²     Body mass index is 37.62 kg/m².       General: No immediate distress  Head: Normocephalic/ Atraumatic  Ey Haptoglobin 07/16/2021 197.0  30.0 - 200.0 mg/dL Final   • Retic% 07/16/2021 1.3  0.5 - 2.5 % Final   • Retic Absolute 07/16/2021 50.4  22.5 - 147.5 x10(3) uL Final   • Retic IRF 07/16/2021 0.069* 0.100 - 0.300 Ratio Final   • Reticulocyte Hemoglobin Equiv 238  200 - 360 mg/dL Final   • Total Iron Binding Capacity 06/09/2021 355  240 - 450 ug/dL Final   • % Saturation 06/09/2021 13* 15 - 50 % Final   • Ferritin 06/09/2021 32.9  18.0 - 340.0 ng/mL Final   • HgbA1C 06/09/2021 7.1* <5.7 % Final   • Estimated Av 06/06/2021 3-5* 0 - 2 /HPF Final   • Bacteria Urine 06/06/2021 None Seen  None Seen /HPF Final   • Squamous Epi.  Cells 06/06/2021 Few* None Seen /HPF Final   • WBC 06/06/2021 7.3  4.0 - 11.0 x10(3) uL Final   • RBC 06/06/2021 4.04  3.80 - 5.30 x10(6)uL Fin (CPT=72110)  Narrative: PROCEDURE: XR LUMBAR SPINE AP LAT FLEX EXT EM (CPT=72120)     COMPARISON: [facility], [facility], XR CHEST PA + LAT CHEST (CPT=71046), 1/25/2021, 10:33 AM.  [facility], CT ABDOMEN PELVIS IV CONTRAST NO O findings. RTC in 6 weeks or sooner  Discharge Instructions were provided as documented in AVS summary. The patient was in agreement with the assessment and plan. All questions were answered. There were no barriers to learning.        Mechanical low

## 2021-09-08 NOTE — PATIENT INSTRUCTIONS
1) Get XR of the left foot since you have pain over the top and big toe  2) Start gabapentin 100 mg three times per day. If limited improvement, then would increase to 200 mg three times per day.   3) My office will call you once the MRI is approved by your

## 2021-09-15 ENCOUNTER — TELEPHONE (OUTPATIENT)
Dept: NEUROLOGY | Facility: CLINIC | Age: 66
End: 2021-09-15

## 2021-09-15 NOTE — TELEPHONE ENCOUNTER
Patient came in to drop CD including MRI - L Spine and also to inform that pain is increasing and she also states she is very constipated. She will like ca call back to provide any advise. show

## 2021-09-17 NOTE — TELEPHONE ENCOUNTER
Disc copied in to PACS- No report in envelope. Disc mailed back to address on file.      [facility]

## 2021-09-17 NOTE — TELEPHONE ENCOUNTER
S/w patient states that she's constipated with Gabapentin, been taking a lot of laxatives. States she's unable to walk and pain is worsening. Patient has been r/s to an earlier date.

## 2021-09-20 ENCOUNTER — OFFICE VISIT (OUTPATIENT)
Dept: PHYSICAL MEDICINE AND REHAB | Facility: CLINIC | Age: 66
End: 2021-09-20
Payer: MEDICARE

## 2021-09-20 VITALS
BODY MASS INDEX: 37.79 KG/M2 | HEIGHT: 58 IN | WEIGHT: 180 LBS | DIASTOLIC BLOOD PRESSURE: 70 MMHG | SYSTOLIC BLOOD PRESSURE: 120 MMHG

## 2021-09-20 DIAGNOSIS — M47.816 FACET SYNDROME, LUMBAR: ICD-10-CM

## 2021-09-20 DIAGNOSIS — M54.59 MECHANICAL LOW BACK PAIN: ICD-10-CM

## 2021-09-20 DIAGNOSIS — R20.0 BILATERAL HAND NUMBNESS: ICD-10-CM

## 2021-09-20 DIAGNOSIS — M48.061 LUMBAR FORAMINAL STENOSIS: ICD-10-CM

## 2021-09-20 DIAGNOSIS — M79.10 MYALGIA: ICD-10-CM

## 2021-09-20 DIAGNOSIS — M54.59 LUMBAR TRIGGER POINT SYNDROME: ICD-10-CM

## 2021-09-20 DIAGNOSIS — M51.37 DDD (DEGENERATIVE DISC DISEASE), LUMBOSACRAL: ICD-10-CM

## 2021-09-20 DIAGNOSIS — E78.5 HYPERLIPIDEMIA, UNSPECIFIED HYPERLIPIDEMIA TYPE: ICD-10-CM

## 2021-09-20 DIAGNOSIS — I10 ESSENTIAL HYPERTENSION: ICD-10-CM

## 2021-09-20 DIAGNOSIS — R29.898 HAND WEAKNESS: ICD-10-CM

## 2021-09-20 DIAGNOSIS — M43.16 SPONDYLOLISTHESIS OF LUMBAR REGION: Primary | ICD-10-CM

## 2021-09-20 DIAGNOSIS — M99.9 BIOMECHANICAL LESION: ICD-10-CM

## 2021-09-20 DIAGNOSIS — M47.816 LUMBAR SPONDYLOSIS: ICD-10-CM

## 2021-09-20 DIAGNOSIS — M48.062 SPINAL STENOSIS OF LUMBAR REGION WITH NEUROGENIC CLAUDICATION: ICD-10-CM

## 2021-09-20 DIAGNOSIS — M51.26 BULGE OF LUMBAR DISC WITHOUT MYELOPATHY: ICD-10-CM

## 2021-09-20 DIAGNOSIS — Z98.1 S/P CERVICAL SPINAL FUSION: ICD-10-CM

## 2021-09-20 PROCEDURE — 99214 OFFICE O/P EST MOD 30 MIN: CPT | Performed by: PHYSICAL MEDICINE & REHABILITATION

## 2021-09-20 RX ORDER — LIDOCAINE HYDROCHLORIDE 20 MG/ML
1 SOLUTION ORAL; TOPICAL
COMMUNITY
Start: 2021-07-16

## 2021-09-20 NOTE — PROGRESS NOTES
[address]  Progress Note    CHIEF COMPLAINT:  Patient presents with:  Back Pain: pt is here for low back pain radiates down left leg with toe numbness.    states has not started PT or has not had MRI done ye 0.25        Years: 47.00        Pack years: 11.75        Types: Cigarettes      Smokeless tobacco: Never Used      Tobacco comment: 1-2 CIGARETTES PER DAY    Vaping Use      Vaping Use: Never used    Substance and Sexual Activity      Alcohol use:  No Gastrointestinal: Negative. Genitourinary: Negative. Musculoskeletal: As per HPI  Skin: Negative. Neurological: As per HPI  Endo/Heme/Allergies: Negative. Psychiatric/Behavioral: Negative. All other systems reviewed and are negative.  Per symptoms     Gait:  Antalgic favoring the left side    Data  Office Visit on 07/16/2021   Component Date Value Ref Range Status   • Folate (Folic Acid) 80/56/3139 7.1* >=8.7 ng/mL Final   • Vitamin B12 07/16/2021 420  193 - 986 pg/mL Final   • LDH 07/16/20 07/16/2021 9.1  % Final   • Eosinophil % 07/16/2021 3.6  % Final   • Basophil % 07/16/2021 0.6  % Final   • Immature Granulocyte % 07/16/2021 0.4  % Final   Office Visit on 06/09/2021   Component Date Value Ref Range Status   • Iron 06/09/2021 45* 50 - 170 • Protein Urine 06/06/2021 Negative  Negative mg/dL Final   • Urobilinogen Urine 06/06/2021 <2.0  <2.0 Final   • Nitrite Urine 06/06/2021 Negative  Negative Final   • Leukocyte Esterase Urine 06/06/2021 Negative  Negative Final   • WBC Urine 06/06/2021 1 Total Protein 06/06/2021 7.2  6.4 - 8.2 g/dL Final   • Albumin 06/06/2021 3.2* 3.4 - 5.0 g/dL Final   • Lipase 06/06/2021 58* 73 - 393 U/L Final   ]      Radiology Imaging:  I reviewed with the patient her X-ray of the lumbar spine from 7/29/2021  XR LUMBA narrowing. ASSESSMENT AND PLAN:  The patient is a pleasant 38-year-old female who presents for follow-up of her left-sided low back pain with radiation down the left leg as well as numbness and tingling in the left hand.   I am recommending an MRI of the

## 2021-09-29 ENCOUNTER — OFFICE VISIT (OUTPATIENT)
Dept: PHYSICAL THERAPY | Age: 66
End: 2021-09-29
Attending: PHYSICAL MEDICINE & REHABILITATION
Payer: MEDICARE

## 2021-09-29 DIAGNOSIS — M47.816 LUMBAR SPONDYLOSIS: ICD-10-CM

## 2021-09-29 DIAGNOSIS — M51.16 LUMBAR DISC HERNIATION WITH RADICULOPATHY: ICD-10-CM

## 2021-09-29 DIAGNOSIS — M47.816 FACET SYNDROME, LUMBAR: ICD-10-CM

## 2021-09-29 DIAGNOSIS — M43.22 CERVICAL VERTEBRAL FUSION: ICD-10-CM

## 2021-09-29 DIAGNOSIS — I10 ESSENTIAL HYPERTENSION: Chronic | ICD-10-CM

## 2021-09-29 DIAGNOSIS — K29.60 NSAID INDUCED GASTRITIS: ICD-10-CM

## 2021-09-29 DIAGNOSIS — M51.37 DDD (DEGENERATIVE DISC DISEASE), LUMBOSACRAL: ICD-10-CM

## 2021-09-29 DIAGNOSIS — M48.061 LUMBAR FORAMINAL STENOSIS: ICD-10-CM

## 2021-09-29 DIAGNOSIS — E78.5 HYPERLIPIDEMIA, UNSPECIFIED HYPERLIPIDEMIA TYPE: Chronic | ICD-10-CM

## 2021-09-29 DIAGNOSIS — E11.9 TYPE 2 DIABETES MELLITUS WITHOUT COMPLICATION, WITHOUT LONG-TERM CURRENT USE OF INSULIN (HCC): ICD-10-CM

## 2021-09-29 DIAGNOSIS — M17.11 PRIMARY OSTEOARTHRITIS OF RIGHT KNEE: ICD-10-CM

## 2021-09-29 DIAGNOSIS — M51.26 BULGE OF LUMBAR DISC WITHOUT MYELOPATHY: ICD-10-CM

## 2021-09-29 DIAGNOSIS — S76.019A STRAIN OF GLUTEUS MEDIUS, UNSPECIFIED LATERALITY, INITIAL ENCOUNTER: ICD-10-CM

## 2021-09-29 DIAGNOSIS — E66.01 CLASS 2 SEVERE OBESITY DUE TO EXCESS CALORIES WITH SERIOUS COMORBIDITY AND BODY MASS INDEX (BMI) OF 39.0 TO 39.9 IN ADULT (HCC): ICD-10-CM

## 2021-09-29 DIAGNOSIS — T39.395A NSAID INDUCED GASTRITIS: ICD-10-CM

## 2021-09-29 DIAGNOSIS — M54.59 MECHANICAL LOW BACK PAIN: ICD-10-CM

## 2021-09-29 DIAGNOSIS — M54.59 LUMBAR TRIGGER POINT SYNDROME: ICD-10-CM

## 2021-09-29 PROCEDURE — 97110 THERAPEUTIC EXERCISES: CPT

## 2021-09-29 PROCEDURE — 97162 PT EVAL MOD COMPLEX 30 MIN: CPT

## 2021-09-29 NOTE — PROGRESS NOTES
SPINE EVALUATION:   Referring Physician: Dr. Barkley Spine  Diagnosis: Low back pain     Date of Service: 9/29/2021     PATIENT García Ellison is a 74year old female who presents to therapy today with complaints of low back pain and left leg pain th dermatome, myotome weakness at L4, positive neural provocation testing, and functional strength deficits at hip/knee. Functional deficits include but are not limited to standing, walking, sitting, sleeping through the night.   Signs and symptoms are consis P1    Charges: PT Eval Moderate Complexity, x1 therex      Total Timed Treatment: 10 min     Total Treatment Time: 50 min     Based on clinical rationale and outcome measures, this evaluation involved Moderate Complexity decision making due to 3+ personal signed by therapist: Yassine Walker, PT    [de-identified] certification required: Yes  I certify the need for these services furnished under this plan of treatment and while under my care.     X___________________________________________________ Date_______

## 2021-10-04 DIAGNOSIS — D64.9 NORMOCYTIC ANEMIA: Primary | ICD-10-CM

## 2021-10-04 DIAGNOSIS — E53.8 FOLIC ACID DEFICIENCY: ICD-10-CM

## 2021-10-05 ENCOUNTER — TELEPHONE (OUTPATIENT)
Dept: HEMATOLOGY/ONCOLOGY | Facility: HOSPITAL | Age: 66
End: 2021-10-05

## 2021-10-05 NOTE — TELEPHONE ENCOUNTER
Left message regarding labs that need to be drawn before tomorrow's appointment. Informed patient that she can get these drawn at the [facility] [employer] [facility] or any [facility]. Informed if she had any question to call us back.

## 2021-10-06 ENCOUNTER — APPOINTMENT (OUTPATIENT)
Dept: HEMATOLOGY/ONCOLOGY | Facility: HOSPITAL | Age: 66
End: 2021-10-06
Attending: INTERNAL MEDICINE
Payer: MEDICARE

## 2021-10-06 ENCOUNTER — OFFICE VISIT (OUTPATIENT)
Dept: PHYSICAL THERAPY | Age: 66
End: 2021-10-06
Attending: PHYSICAL MEDICINE & REHABILITATION
Payer: MEDICARE

## 2021-10-06 VITALS
SYSTOLIC BLOOD PRESSURE: 130 MMHG | OXYGEN SATURATION: 98 % | HEART RATE: 65 BPM | BODY MASS INDEX: 38.71 KG/M2 | TEMPERATURE: 99 F | WEIGHT: 192 LBS | HEIGHT: 59 IN | DIASTOLIC BLOOD PRESSURE: 41 MMHG | RESPIRATION RATE: 18 BRPM

## 2021-10-06 DIAGNOSIS — E78.5 HYPERLIPIDEMIA, UNSPECIFIED HYPERLIPIDEMIA TYPE: Chronic | ICD-10-CM

## 2021-10-06 DIAGNOSIS — M17.11 PRIMARY OSTEOARTHRITIS OF RIGHT KNEE: ICD-10-CM

## 2021-10-06 DIAGNOSIS — S76.019A STRAIN OF GLUTEUS MEDIUS, UNSPECIFIED LATERALITY, INITIAL ENCOUNTER: ICD-10-CM

## 2021-10-06 DIAGNOSIS — M51.37 DDD (DEGENERATIVE DISC DISEASE), LUMBOSACRAL: ICD-10-CM

## 2021-10-06 DIAGNOSIS — M51.26 BULGE OF LUMBAR DISC WITHOUT MYELOPATHY: ICD-10-CM

## 2021-10-06 DIAGNOSIS — I10 ESSENTIAL HYPERTENSION: Chronic | ICD-10-CM

## 2021-10-06 DIAGNOSIS — M48.061 LUMBAR FORAMINAL STENOSIS: ICD-10-CM

## 2021-10-06 DIAGNOSIS — T39.395A NSAID INDUCED GASTRITIS: ICD-10-CM

## 2021-10-06 DIAGNOSIS — M43.22 CERVICAL VERTEBRAL FUSION: ICD-10-CM

## 2021-10-06 DIAGNOSIS — M51.16 LUMBAR DISC HERNIATION WITH RADICULOPATHY: ICD-10-CM

## 2021-10-06 DIAGNOSIS — E53.8 FOLIC ACID DEFICIENCY: ICD-10-CM

## 2021-10-06 DIAGNOSIS — M54.59 MECHANICAL LOW BACK PAIN: ICD-10-CM

## 2021-10-06 DIAGNOSIS — M47.816 LUMBAR SPONDYLOSIS: ICD-10-CM

## 2021-10-06 DIAGNOSIS — M54.59 LUMBAR TRIGGER POINT SYNDROME: ICD-10-CM

## 2021-10-06 DIAGNOSIS — M47.816 FACET SYNDROME, LUMBAR: ICD-10-CM

## 2021-10-06 DIAGNOSIS — K29.60 NSAID INDUCED GASTRITIS: ICD-10-CM

## 2021-10-06 DIAGNOSIS — D64.9 NORMOCYTIC ANEMIA: ICD-10-CM

## 2021-10-06 DIAGNOSIS — E66.01 CLASS 2 SEVERE OBESITY DUE TO EXCESS CALORIES WITH SERIOUS COMORBIDITY AND BODY MASS INDEX (BMI) OF 39.0 TO 39.9 IN ADULT (HCC): ICD-10-CM

## 2021-10-06 DIAGNOSIS — E11.9 TYPE 2 DIABETES MELLITUS WITHOUT COMPLICATION, WITHOUT LONG-TERM CURRENT USE OF INSULIN (HCC): ICD-10-CM

## 2021-10-06 PROCEDURE — 97110 THERAPEUTIC EXERCISES: CPT

## 2021-10-06 PROCEDURE — 97140 MANUAL THERAPY 1/> REGIONS: CPT

## 2021-10-06 PROCEDURE — 99214 OFFICE O/P EST MOD 30 MIN: CPT | Performed by: INTERNAL MEDICINE

## 2021-10-06 NOTE — PROGRESS NOTES
Dx: Low back pain with leg paresthesias         Insurance (Authorized # of Visits):  Medicare (10)           Authorizing Physician: Dr. Kosta Briscoe  Next MD visit: none scheduled  Fall Risk: standard         Precautions: n/a             Subjective: Kind of feels TX#: 3/ Date:                 TX#: 4/ Date:                 TX#: 5/ Date:    Tx#: 6/   Manual therapy  L5/S1 Grade III+ L UPA  STM to QL  STM to glute min/max  -no pain with R SB  x25 min total       Therex:  Standing lumbar ext review, pt performing co

## 2021-10-06 NOTE — PROGRESS NOTES
Hematology Progress Note    Patient Name: Darcy Juarez   YOB: 1955   Medical Record Number: [id number]   CSN: [id number]  Consulting Physician: David Morales MD  Referring Physician(s):  Mario Penaloza MD  Date of Visit: 10/6/2021     Chief Co Cancer Father    • Diabetes Mother         Diabetes Type 2   • Other (Other) Mother    • Hypertension Other         Family h/o   • Other (Other) Other         Family h   • Other (Other) Sister         Gallbladder disease   • Bleeding Disorders Neg    • DVT redness. Respiratory: Negative for cough, hemoptysis, chest pain, or dyspnea on exertion. Gastrointestinal: Negative for dysphagia, odynophagia, reflux symptoms, nausea, vomiting, change in bowel habits, diarrhea, constipation and abdominal pain.   Integu Lab Results   Component Value Date/Time     (H) 06/06/2021 10:49 AM    BUN 15 06/06/2021 10:49 AM    CREATSERUM 0.55 06/06/2021 10:49 AM    GFRNAA 97 06/06/2021 10:49 AM    CA 9.0 06/06/2021 10:49 AM    ALB 3.2 (L) 06/06/2021 10:49 AM    NA 14     PELVIC NODES: No enlarged mass or adenopathy.     PELVIC ORGANS: Anteverted uterus.  Nodular calcification in the ventral aspect of the uterus compatible with fibroid.  Ovaries are normal in size.  No adnexal mass.    BONES:   Mild degenerative endplate HLD, osteoarthritis as well as history of stroke presents for normocytic anemia with findings consistent with folic acid deficiency    Plan:    1.) Normocytic anemia    --previously labs showed iron deficiency anemia  --Patient is not a strict vegetarian, [id number]

## 2021-10-07 ENCOUNTER — TELEPHONE (OUTPATIENT)
Dept: HEMATOLOGY/ONCOLOGY | Facility: HOSPITAL | Age: 66
End: 2021-10-07

## 2021-10-07 NOTE — TELEPHONE ENCOUNTER
LVMTCB if the patient has any questions. Patient informed that her Folate level was 6.6 and Dr. Tor Gonsalves is recommending the patient to continue taking the Folic Acid.

## 2021-10-13 ENCOUNTER — OFFICE VISIT (OUTPATIENT)
Dept: PHYSICAL THERAPY | Age: 66
End: 2021-10-13
Attending: PHYSICAL MEDICINE & REHABILITATION
Payer: MEDICARE

## 2021-10-13 DIAGNOSIS — E66.01 CLASS 2 SEVERE OBESITY DUE TO EXCESS CALORIES WITH SERIOUS COMORBIDITY AND BODY MASS INDEX (BMI) OF 39.0 TO 39.9 IN ADULT (HCC): ICD-10-CM

## 2021-10-13 DIAGNOSIS — E78.5 HYPERLIPIDEMIA, UNSPECIFIED HYPERLIPIDEMIA TYPE: Chronic | ICD-10-CM

## 2021-10-13 DIAGNOSIS — M54.59 MECHANICAL LOW BACK PAIN: ICD-10-CM

## 2021-10-13 DIAGNOSIS — E11.9 TYPE 2 DIABETES MELLITUS WITHOUT COMPLICATION, WITHOUT LONG-TERM CURRENT USE OF INSULIN (HCC): ICD-10-CM

## 2021-10-13 DIAGNOSIS — K29.60 NSAID INDUCED GASTRITIS: ICD-10-CM

## 2021-10-13 DIAGNOSIS — M51.37 DDD (DEGENERATIVE DISC DISEASE), LUMBOSACRAL: ICD-10-CM

## 2021-10-13 DIAGNOSIS — M47.816 FACET SYNDROME, LUMBAR: ICD-10-CM

## 2021-10-13 DIAGNOSIS — M48.061 LUMBAR FORAMINAL STENOSIS: ICD-10-CM

## 2021-10-13 DIAGNOSIS — M47.816 LUMBAR SPONDYLOSIS: ICD-10-CM

## 2021-10-13 DIAGNOSIS — I10 ESSENTIAL HYPERTENSION: Chronic | ICD-10-CM

## 2021-10-13 DIAGNOSIS — M17.11 PRIMARY OSTEOARTHRITIS OF RIGHT KNEE: ICD-10-CM

## 2021-10-13 DIAGNOSIS — S76.019A STRAIN OF GLUTEUS MEDIUS, UNSPECIFIED LATERALITY, INITIAL ENCOUNTER: ICD-10-CM

## 2021-10-13 DIAGNOSIS — M51.16 LUMBAR DISC HERNIATION WITH RADICULOPATHY: ICD-10-CM

## 2021-10-13 DIAGNOSIS — T39.395A NSAID INDUCED GASTRITIS: ICD-10-CM

## 2021-10-13 DIAGNOSIS — M51.26 BULGE OF LUMBAR DISC WITHOUT MYELOPATHY: ICD-10-CM

## 2021-10-13 DIAGNOSIS — M43.22 CERVICAL VERTEBRAL FUSION: ICD-10-CM

## 2021-10-13 DIAGNOSIS — M54.59 LUMBAR TRIGGER POINT SYNDROME: ICD-10-CM

## 2021-10-13 PROCEDURE — 97110 THERAPEUTIC EXERCISES: CPT

## 2021-10-13 PROCEDURE — 97140 MANUAL THERAPY 1/> REGIONS: CPT

## 2021-10-13 NOTE — PROGRESS NOTES
Dx: Low back pain with leg paresthesias         Insurance (Authorized # of Visits):  Medicare (10)           Authorizing Physician: Dr. Mariam Bartholomew  Next MD visit: none scheduled  Fall Risk: standard         Precautions: n/a             Subjective: More pain ove spine mobility  Date: 10/6/2021  TX#: 2/10 Date: 10/13/21               TX#: 3/10 Date:                 TX#: 4/ Date:                 TX#: 5/ Date:    Tx#: 6/   Manual therapy  L5/S1 Grade III+ L UPA  STM to QL  STM to glute min/max  -no pain with R SB  x25

## 2021-10-14 ENCOUNTER — HOSPITAL ENCOUNTER (OUTPATIENT)
Dept: MRI IMAGING | Facility: HOSPITAL | Age: 66
Discharge: HOME | End: 2021-10-14
Attending: PHYSICAL MEDICINE & REHABILITATION
Payer: MEDICARE

## 2021-10-14 DIAGNOSIS — M43.16 SPONDYLOLISTHESIS OF LUMBAR REGION: ICD-10-CM

## 2021-10-14 DIAGNOSIS — M79.10 MYALGIA: ICD-10-CM

## 2021-10-14 DIAGNOSIS — M54.59 MECHANICAL LOW BACK PAIN: ICD-10-CM

## 2021-10-14 DIAGNOSIS — M99.9 BIOMECHANICAL LESION: ICD-10-CM

## 2021-10-14 DIAGNOSIS — M48.062 SPINAL STENOSIS OF LUMBAR REGION WITH NEUROGENIC CLAUDICATION: ICD-10-CM

## 2021-10-14 DIAGNOSIS — M47.816 FACET SYNDROME, LUMBAR: ICD-10-CM

## 2021-10-14 DIAGNOSIS — M47.816 LUMBAR SPONDYLOSIS: ICD-10-CM

## 2021-10-14 DIAGNOSIS — M54.59 LUMBAR TRIGGER POINT SYNDROME: ICD-10-CM

## 2021-10-14 DIAGNOSIS — M51.37 DDD (DEGENERATIVE DISC DISEASE), LUMBOSACRAL: ICD-10-CM

## 2021-10-14 DIAGNOSIS — M51.26 BULGE OF LUMBAR DISC WITHOUT MYELOPATHY: ICD-10-CM

## 2021-10-14 DIAGNOSIS — M48.061 LUMBAR FORAMINAL STENOSIS: ICD-10-CM

## 2021-10-14 PROCEDURE — 72148 MRI LUMBAR SPINE W/O DYE: CPT | Performed by: PHYSICAL MEDICINE & REHABILITATION

## 2021-10-15 ENCOUNTER — OFFICE VISIT (OUTPATIENT)
Dept: PHYSICAL THERAPY | Age: 66
End: 2021-10-15
Attending: PHYSICAL MEDICINE & REHABILITATION
Payer: MEDICARE

## 2021-10-15 DIAGNOSIS — M51.16 LUMBAR DISC HERNIATION WITH RADICULOPATHY: ICD-10-CM

## 2021-10-15 DIAGNOSIS — K29.60 NSAID INDUCED GASTRITIS: ICD-10-CM

## 2021-10-15 DIAGNOSIS — M54.59 MECHANICAL LOW BACK PAIN: ICD-10-CM

## 2021-10-15 DIAGNOSIS — E78.5 HYPERLIPIDEMIA, UNSPECIFIED HYPERLIPIDEMIA TYPE: Chronic | ICD-10-CM

## 2021-10-15 DIAGNOSIS — I10 ESSENTIAL HYPERTENSION: Chronic | ICD-10-CM

## 2021-10-15 DIAGNOSIS — E66.01 CLASS 2 SEVERE OBESITY DUE TO EXCESS CALORIES WITH SERIOUS COMORBIDITY AND BODY MASS INDEX (BMI) OF 39.0 TO 39.9 IN ADULT (HCC): ICD-10-CM

## 2021-10-15 DIAGNOSIS — M51.26 BULGE OF LUMBAR DISC WITHOUT MYELOPATHY: ICD-10-CM

## 2021-10-15 DIAGNOSIS — M54.59 LUMBAR TRIGGER POINT SYNDROME: ICD-10-CM

## 2021-10-15 DIAGNOSIS — E11.9 TYPE 2 DIABETES MELLITUS WITHOUT COMPLICATION, WITHOUT LONG-TERM CURRENT USE OF INSULIN (HCC): ICD-10-CM

## 2021-10-15 DIAGNOSIS — M51.37 DDD (DEGENERATIVE DISC DISEASE), LUMBOSACRAL: ICD-10-CM

## 2021-10-15 DIAGNOSIS — M47.816 LUMBAR SPONDYLOSIS: ICD-10-CM

## 2021-10-15 DIAGNOSIS — M48.061 LUMBAR FORAMINAL STENOSIS: ICD-10-CM

## 2021-10-15 DIAGNOSIS — M43.22 CERVICAL VERTEBRAL FUSION: ICD-10-CM

## 2021-10-15 DIAGNOSIS — M17.11 PRIMARY OSTEOARTHRITIS OF RIGHT KNEE: ICD-10-CM

## 2021-10-15 DIAGNOSIS — M47.816 FACET SYNDROME, LUMBAR: ICD-10-CM

## 2021-10-15 DIAGNOSIS — S76.019A STRAIN OF GLUTEUS MEDIUS, UNSPECIFIED LATERALITY, INITIAL ENCOUNTER: ICD-10-CM

## 2021-10-15 DIAGNOSIS — T39.395A NSAID INDUCED GASTRITIS: ICD-10-CM

## 2021-10-15 PROCEDURE — 97140 MANUAL THERAPY 1/> REGIONS: CPT

## 2021-10-15 PROCEDURE — 97110 THERAPEUTIC EXERCISES: CPT

## 2021-10-15 NOTE — PROGRESS NOTES
Dx: Low back pain with leg paresthesias         Insurance (Authorized # of Visits):  Medicare (10)           Authorizing Physician: Dr. Rivka Goldberg  Next MD visit: none scheduled  Fall Risk: standard         Precautions: n/a             Subjective:  Pain tends t 10/6/2021  TX#: 2/10 Date: 10/13/21               TX#: 3/10 Date: 10/15/21             TX#: 4/10 Date:                 TX#: 5/ Date:    Tx#: 6/   Manual therapy  L5/S1 Grade III+ L UPA  STM to QL  STM to glute min/max  -no pain with R SB  x25 min total Manu

## 2021-10-20 ENCOUNTER — OFFICE VISIT (OUTPATIENT)
Dept: PHYSICAL THERAPY | Age: 66
End: 2021-10-20
Attending: PHYSICAL MEDICINE & REHABILITATION
Payer: MEDICARE

## 2021-10-20 DIAGNOSIS — M48.061 LUMBAR FORAMINAL STENOSIS: ICD-10-CM

## 2021-10-20 DIAGNOSIS — E78.5 HYPERLIPIDEMIA, UNSPECIFIED HYPERLIPIDEMIA TYPE: Chronic | ICD-10-CM

## 2021-10-20 DIAGNOSIS — E66.01 CLASS 2 SEVERE OBESITY DUE TO EXCESS CALORIES WITH SERIOUS COMORBIDITY AND BODY MASS INDEX (BMI) OF 39.0 TO 39.9 IN ADULT (HCC): ICD-10-CM

## 2021-10-20 DIAGNOSIS — I10 ESSENTIAL HYPERTENSION: Chronic | ICD-10-CM

## 2021-10-20 DIAGNOSIS — M51.37 DDD (DEGENERATIVE DISC DISEASE), LUMBOSACRAL: ICD-10-CM

## 2021-10-20 DIAGNOSIS — M54.59 LUMBAR TRIGGER POINT SYNDROME: ICD-10-CM

## 2021-10-20 DIAGNOSIS — E11.9 TYPE 2 DIABETES MELLITUS WITHOUT COMPLICATION, WITHOUT LONG-TERM CURRENT USE OF INSULIN (HCC): ICD-10-CM

## 2021-10-20 DIAGNOSIS — M54.59 MECHANICAL LOW BACK PAIN: ICD-10-CM

## 2021-10-20 DIAGNOSIS — M51.16 LUMBAR DISC HERNIATION WITH RADICULOPATHY: ICD-10-CM

## 2021-10-20 DIAGNOSIS — M17.11 PRIMARY OSTEOARTHRITIS OF RIGHT KNEE: ICD-10-CM

## 2021-10-20 DIAGNOSIS — S76.019A STRAIN OF GLUTEUS MEDIUS, UNSPECIFIED LATERALITY, INITIAL ENCOUNTER: ICD-10-CM

## 2021-10-20 DIAGNOSIS — K29.60 NSAID INDUCED GASTRITIS: ICD-10-CM

## 2021-10-20 DIAGNOSIS — T39.395A NSAID INDUCED GASTRITIS: ICD-10-CM

## 2021-10-20 DIAGNOSIS — M51.26 BULGE OF LUMBAR DISC WITHOUT MYELOPATHY: ICD-10-CM

## 2021-10-20 DIAGNOSIS — M43.22 CERVICAL VERTEBRAL FUSION: ICD-10-CM

## 2021-10-20 DIAGNOSIS — M47.816 LUMBAR SPONDYLOSIS: ICD-10-CM

## 2021-10-20 DIAGNOSIS — M47.816 FACET SYNDROME, LUMBAR: ICD-10-CM

## 2021-10-20 PROCEDURE — 97110 THERAPEUTIC EXERCISES: CPT

## 2021-10-20 NOTE — PROGRESS NOTES
Dx: Low back pain with leg paresthesias         Insurance (Authorized # of Visits):  Medicare (10)           Authorizing Physician: Dr. Kiran Jarquin  Next MD visit: none scheduled  Fall Risk: standard         Precautions: n/a             Subjective:   Worst at . [facility] improve function with ADL   · Pt will have decreased paraspinal mm tension to tolerate standing >15 minutes for work and home activities   · Pt will demonstrate improved R knee flexion to 85 deg or more for improved stair climbing.   · Pt will be independen therex       Total Timed Treatment: 40 min  Total Treatment Time: 40 min

## 2021-10-25 ENCOUNTER — TELEPHONE (OUTPATIENT)
Dept: PHYSICAL THERAPY | Age: 66
End: 2021-10-25

## 2021-10-25 ENCOUNTER — APPOINTMENT (OUTPATIENT)
Dept: PHYSICAL THERAPY | Age: 66
End: 2021-10-25
Attending: PHYSICAL MEDICINE & REHABILITATION
Payer: MEDICARE

## 2021-10-28 ENCOUNTER — OFFICE VISIT (OUTPATIENT)
Dept: PHYSICAL THERAPY | Age: 66
End: 2021-10-28
Attending: PHYSICAL MEDICINE & REHABILITATION
Payer: MEDICARE

## 2021-10-28 ENCOUNTER — TELEPHONE (OUTPATIENT)
Dept: NEUROLOGY | Facility: CLINIC | Age: 66
End: 2021-10-28

## 2021-10-28 ENCOUNTER — PROCEDURE VISIT (OUTPATIENT)
Dept: PHYSICAL MEDICINE AND REHAB | Facility: CLINIC | Age: 66
End: 2021-10-28
Payer: MEDICARE

## 2021-10-28 DIAGNOSIS — E78.5 HYPERLIPIDEMIA, UNSPECIFIED HYPERLIPIDEMIA TYPE: ICD-10-CM

## 2021-10-28 DIAGNOSIS — M43.22 CERVICAL VERTEBRAL FUSION: ICD-10-CM

## 2021-10-28 DIAGNOSIS — M51.37 DDD (DEGENERATIVE DISC DISEASE), LUMBOSACRAL: ICD-10-CM

## 2021-10-28 DIAGNOSIS — M51.26 BULGE OF LUMBAR DISC WITHOUT MYELOPATHY: ICD-10-CM

## 2021-10-28 DIAGNOSIS — M54.59 MECHANICAL LOW BACK PAIN: ICD-10-CM

## 2021-10-28 DIAGNOSIS — M54.59 LUMBAR TRIGGER POINT SYNDROME: ICD-10-CM

## 2021-10-28 DIAGNOSIS — K29.60 NSAID INDUCED GASTRITIS: ICD-10-CM

## 2021-10-28 DIAGNOSIS — M47.816 FACET SYNDROME, LUMBAR: ICD-10-CM

## 2021-10-28 DIAGNOSIS — M47.816 LUMBAR SPONDYLOSIS: ICD-10-CM

## 2021-10-28 DIAGNOSIS — M48.061 LUMBAR FORAMINAL STENOSIS: ICD-10-CM

## 2021-10-28 DIAGNOSIS — E78.5 HYPERLIPIDEMIA, UNSPECIFIED HYPERLIPIDEMIA TYPE: Chronic | ICD-10-CM

## 2021-10-28 DIAGNOSIS — S76.019A STRAIN OF GLUTEUS MEDIUS, UNSPECIFIED LATERALITY, INITIAL ENCOUNTER: ICD-10-CM

## 2021-10-28 DIAGNOSIS — M17.11 PRIMARY OSTEOARTHRITIS OF RIGHT KNEE: ICD-10-CM

## 2021-10-28 DIAGNOSIS — M51.16 LUMBAR DISC HERNIATION WITH RADICULOPATHY: ICD-10-CM

## 2021-10-28 DIAGNOSIS — E66.01 CLASS 2 SEVERE OBESITY DUE TO EXCESS CALORIES WITH SERIOUS COMORBIDITY AND BODY MASS INDEX (BMI) OF 39.0 TO 39.9 IN ADULT (HCC): ICD-10-CM

## 2021-10-28 DIAGNOSIS — Z98.1 S/P CERVICAL SPINAL FUSION: ICD-10-CM

## 2021-10-28 DIAGNOSIS — M43.16 SPONDYLOLISTHESIS OF LUMBAR REGION: Primary | ICD-10-CM

## 2021-10-28 DIAGNOSIS — R20.0 BILATERAL HAND NUMBNESS: ICD-10-CM

## 2021-10-28 DIAGNOSIS — T39.395A NSAID INDUCED GASTRITIS: ICD-10-CM

## 2021-10-28 DIAGNOSIS — I10 ESSENTIAL HYPERTENSION: Chronic | ICD-10-CM

## 2021-10-28 DIAGNOSIS — E11.9 TYPE 2 DIABETES MELLITUS WITHOUT COMPLICATION, WITHOUT LONG-TERM CURRENT USE OF INSULIN (HCC): ICD-10-CM

## 2021-10-28 DIAGNOSIS — M99.9 BIOMECHANICAL LESION: ICD-10-CM

## 2021-10-28 DIAGNOSIS — R29.898 HAND WEAKNESS: ICD-10-CM

## 2021-10-28 DIAGNOSIS — M79.10 MYALGIA: ICD-10-CM

## 2021-10-28 PROCEDURE — 95886 MUSC TEST DONE W/N TEST COMP: CPT | Performed by: PHYSICAL MEDICINE & REHABILITATION

## 2021-10-28 PROCEDURE — 97140 MANUAL THERAPY 1/> REGIONS: CPT

## 2021-10-28 PROCEDURE — 95913 NRV CNDJ TEST 13/> STUDIES: CPT | Performed by: PHYSICAL MEDICINE & REHABILITATION

## 2021-10-28 PROCEDURE — 97110 THERAPEUTIC EXERCISES: CPT

## 2021-10-28 NOTE — PROGRESS NOTES
Dx: Low back pain with leg paresthesias         Insurance (Authorized # of Visits):  Medicare (10)           Authorizing Physician: Dr. Jon Durham  Next MD visit: none scheduled  Fall Risk: standard         Precautions: n/a             Subjective:  Trying to  tension to tolerate standing >15 minutes for work and home activities   · Pt will demonstrate improved R knee flexion to 85 deg or more for improved stair climbing.   · Pt will be independent and compliant with comprehensive HEP to maintain progress achieve pain at rest  Seated ER with set up RTB in door - pt able to demo for home set up  Manual therapy  STM to glute L - reproduced hip pain, no thigh pain this visit                 HEP: self STM, glute isometrics, clams, standing hip abd    Charges: 2 therex,

## 2021-10-28 NOTE — TELEPHONE ENCOUNTER
Per Medicare guidelines authorization is not required for LEFT L5 and LEFt S1 TFESI cpt codes 45599-WW, 23097-KW, 03508. Will inform Nursing.

## 2021-10-28 NOTE — PROCEDURES
[address]   Nerve Conduction Study and Electromyography Procedure Note      Patient: jimbo alandulia Hand Dominance:  right   Patient ID: [id number] Referring Dr:  Jerome Whiting   Sex: Female Test :  Jerome Whiting   Date 13.5 100 3.70 Wrist - FDI 14        B. Elbow FDI 6.61 13.4 99 4.27 B. Elbow - Wrist 19 2.97 64      A. Elbow FDI 8.33 12.7 94.1 4.32 A. Elbow - B. Elbow 9 1.72 52   L Ulnar - FDI      Wrist FDI 3.07 6.8 100 6.25 Wrist - FDI 15        B. Elbow FDI 6.25 5.6 81.6 4 Normal N N N N   R. Biceps brachii Musculocutaneous C5-C6 N None None None None Normal N N N N   R. Triceps brachii Radial C6-C8 N None None None None Normal N N N N   R. Flexor carpi radialis Median C6-C7 N None None None None Normal N N N N   R.  First do a radiculopathy, plexopathy, mononeuropathy, polyneuropathy, or myopathy affecting the examined upper extremity. Thank you for allowing us to participate in the care of your patient.       Electronic signature of the attending physician affirms that he/

## 2021-10-28 NOTE — PATIENT INSTRUCTIONS
1) My office will call you to schedule the LEFT L5 and LEFT S1 TFESI under local anesthesia once the procedure is approved by your insurance carrier. 2) Follow up with me 2 weeks after the procedure.

## 2021-10-29 DIAGNOSIS — I63.311 CEREBROVASCULAR ACCIDENT (CVA) DUE TO THROMBOSIS OF RIGHT MIDDLE CEREBRAL ARTERY (HCC): ICD-10-CM

## 2021-10-29 RX ORDER — ATORVASTATIN CALCIUM 10 MG/1
10 TABLET, FILM COATED ORAL EVERY 24 HOURS
Qty: 7 TABLET | Refills: 0 | Status: SHIPPED | OUTPATIENT
Start: 2021-10-29 | End: 2021-11-21

## 2021-10-29 RX ORDER — ATORVASTATIN CALCIUM 10 MG/1
10 TABLET, FILM COATED ORAL EVERY 24 HOURS
Qty: 30 TABLET | Refills: 0 | OUTPATIENT
Start: 2021-10-29

## 2021-11-01 ENCOUNTER — APPOINTMENT (OUTPATIENT)
Dept: PHYSICAL THERAPY | Age: 66
End: 2021-11-01
Attending: PHYSICAL MEDICINE & REHABILITATION
Payer: MEDICARE

## 2021-11-01 NOTE — TELEPHONE ENCOUNTER
Patient has been scheduled for LEFT L5 and LEFt S1 transforaminal epidural steroid injections on 11/17/21 at the [facility] with Dr. Priscilla Morrison.   -Anesthesia type:  Local.  -If receiving MAC or IVC sedation patient will need to get COVID tested 3 days prior even if

## 2021-11-03 ENCOUNTER — OFFICE VISIT (OUTPATIENT)
Dept: PHYSICAL THERAPY | Age: 66
End: 2021-11-03
Attending: PHYSICAL MEDICINE & REHABILITATION
Payer: MEDICARE

## 2021-11-03 DIAGNOSIS — M51.16 LUMBAR DISC HERNIATION WITH RADICULOPATHY: ICD-10-CM

## 2021-11-03 DIAGNOSIS — S76.019A STRAIN OF GLUTEUS MEDIUS, UNSPECIFIED LATERALITY, INITIAL ENCOUNTER: ICD-10-CM

## 2021-11-03 DIAGNOSIS — M47.816 FACET SYNDROME, LUMBAR: ICD-10-CM

## 2021-11-03 DIAGNOSIS — M54.59 LUMBAR TRIGGER POINT SYNDROME: ICD-10-CM

## 2021-11-03 DIAGNOSIS — E11.9 TYPE 2 DIABETES MELLITUS WITHOUT COMPLICATION, WITHOUT LONG-TERM CURRENT USE OF INSULIN (HCC): ICD-10-CM

## 2021-11-03 DIAGNOSIS — M47.816 LUMBAR SPONDYLOSIS: ICD-10-CM

## 2021-11-03 DIAGNOSIS — M54.59 MECHANICAL LOW BACK PAIN: ICD-10-CM

## 2021-11-03 DIAGNOSIS — M51.37 DDD (DEGENERATIVE DISC DISEASE), LUMBOSACRAL: ICD-10-CM

## 2021-11-03 DIAGNOSIS — M48.061 LUMBAR FORAMINAL STENOSIS: ICD-10-CM

## 2021-11-03 DIAGNOSIS — K29.60 NSAID INDUCED GASTRITIS: ICD-10-CM

## 2021-11-03 DIAGNOSIS — E66.01 CLASS 2 SEVERE OBESITY DUE TO EXCESS CALORIES WITH SERIOUS COMORBIDITY AND BODY MASS INDEX (BMI) OF 39.0 TO 39.9 IN ADULT (HCC): ICD-10-CM

## 2021-11-03 DIAGNOSIS — M43.22 CERVICAL VERTEBRAL FUSION: ICD-10-CM

## 2021-11-03 DIAGNOSIS — M51.26 BULGE OF LUMBAR DISC WITHOUT MYELOPATHY: ICD-10-CM

## 2021-11-03 DIAGNOSIS — M17.11 PRIMARY OSTEOARTHRITIS OF RIGHT KNEE: ICD-10-CM

## 2021-11-03 DIAGNOSIS — E78.5 HYPERLIPIDEMIA, UNSPECIFIED HYPERLIPIDEMIA TYPE: Chronic | ICD-10-CM

## 2021-11-03 DIAGNOSIS — I10 ESSENTIAL HYPERTENSION: Chronic | ICD-10-CM

## 2021-11-03 DIAGNOSIS — T39.395A NSAID INDUCED GASTRITIS: ICD-10-CM

## 2021-11-03 PROCEDURE — 97110 THERAPEUTIC EXERCISES: CPT

## 2021-11-03 NOTE — PROGRESS NOTES
Dx: Low back pain with leg paresthesias         Insurance (Authorized # of Visits):  Medicare (10)           Authorizing Physician: Dr. Diana Hill  Next MD visit: none scheduled  Fall Risk: standard         Precautions: n/a             Subjective:  Sunday - pai ease with bending forward to don shoes   · Pt will report improved symptom centralization and absence of radicular symptoms for 3 consecutive days to improve function with ADL   · Pt will have decreased paraspinal mm tension to tolerate standing >15 minute therapy  STM to glute L - reproduced hip pain, no thigh pain this visit               HEP: self STM, glute isometrics, clams, standing hip abd    Charges: 2therex   Total Timed Treatment: 30 min  Total Treatment Time: 30 min

## 2021-11-15 ENCOUNTER — OFFICE VISIT (OUTPATIENT)
Dept: INTERNAL MEDICINE CLINIC | Facility: CLINIC | Age: 66
End: 2021-11-15
Payer: MEDICARE

## 2021-11-15 VITALS
OXYGEN SATURATION: 98 % | RESPIRATION RATE: 15 BRPM | HEIGHT: 59 IN | SYSTOLIC BLOOD PRESSURE: 128 MMHG | DIASTOLIC BLOOD PRESSURE: 68 MMHG | WEIGHT: 191 LBS | HEART RATE: 81 BPM | BODY MASS INDEX: 38.51 KG/M2

## 2021-11-15 DIAGNOSIS — R41.3 MEMORY DIFFICULTY: ICD-10-CM

## 2021-11-15 DIAGNOSIS — E78.5 HYPERLIPIDEMIA, UNSPECIFIED HYPERLIPIDEMIA TYPE: Primary | ICD-10-CM

## 2021-11-15 DIAGNOSIS — E11.9 TYPE 2 DIABETES MELLITUS WITHOUT COMPLICATION, WITHOUT LONG-TERM CURRENT USE OF INSULIN (HCC): ICD-10-CM

## 2021-11-15 DIAGNOSIS — R32 URINARY INCONTINENCE, UNSPECIFIED TYPE: ICD-10-CM

## 2021-11-15 PROCEDURE — 83036 HEMOGLOBIN GLYCOSYLATED A1C: CPT | Performed by: FAMILY MEDICINE

## 2021-11-15 PROCEDURE — 80061 LIPID PANEL: CPT | Performed by: FAMILY MEDICINE

## 2021-11-15 PROCEDURE — 81001 URINALYSIS AUTO W/SCOPE: CPT | Performed by: FAMILY MEDICINE

## 2021-11-15 PROCEDURE — 99214 OFFICE O/P EST MOD 30 MIN: CPT | Performed by: FAMILY MEDICINE

## 2021-11-15 PROCEDURE — 84443 ASSAY THYROID STIM HORMONE: CPT | Performed by: FAMILY MEDICINE

## 2021-11-15 NOTE — PROGRESS NOTES
PATIENT IDENTIFICATION  Name: Lionel Muñiz  MRN: [id number]    Diagnoses:   Hyperlipidemia, unspecified hyperlipidemia type  (primary encounter diagnosis)  Urinary incontinence, unspecified type  Memory difficulty  Type 2 diabetes mellitus without compl Smoking status: Light Tobacco Smoker        Packs/day: 0.25        Years: 47.00        Pack years: 11.75        Types: Cigarettes      Smokeless tobacco: Never Used      Tobacco comment: 1-2 CIGARETTES PER DAY    Vaping Use      Vaping Use: Never used    A incontinence, unspecified type  - OP REFERRAL TO UROGYNECOLOGY CLINIC  - URINALYSIS WITH CULTURE REFLEX    3. Memory difficulty  - TSH W REFLEX TO FREE T4  - NEURO - INTERNAL    4.  Type 2 diabetes mellitus without complication, without long-term current us

## 2021-11-17 ENCOUNTER — OFFICE VISIT (OUTPATIENT)
Dept: SURGERY | Facility: CLINIC | Age: 66
End: 2021-11-17

## 2021-11-17 DIAGNOSIS — M47.816 FACET SYNDROME, LUMBAR: ICD-10-CM

## 2021-11-17 DIAGNOSIS — M51.16 LUMBAR DISC HERNIATION WITH RADICULOPATHY: ICD-10-CM

## 2021-11-17 DIAGNOSIS — M48.061 LUMBAR FORAMINAL STENOSIS: ICD-10-CM

## 2021-11-17 DIAGNOSIS — M47.816 LUMBAR SPONDYLOSIS: ICD-10-CM

## 2021-11-17 DIAGNOSIS — M51.26 BULGE OF LUMBAR DISC WITHOUT MYELOPATHY: ICD-10-CM

## 2021-11-17 DIAGNOSIS — M79.10 MYALGIA: ICD-10-CM

## 2021-11-17 DIAGNOSIS — M51.37 DDD (DEGENERATIVE DISC DISEASE), LUMBOSACRAL: ICD-10-CM

## 2021-11-17 DIAGNOSIS — M54.59 LUMBAR TRIGGER POINT SYNDROME: ICD-10-CM

## 2021-11-17 DIAGNOSIS — M54.59 MECHANICAL LOW BACK PAIN: Primary | ICD-10-CM

## 2021-11-17 PROCEDURE — 64483 NJX AA&/STRD TFRM EPI L/S 1: CPT | Performed by: PHYSICAL MEDICINE & REHABILITATION

## 2021-11-17 PROCEDURE — 64484 NJX AA&/STRD TFRM EPI L/S EA: CPT | Performed by: PHYSICAL MEDICINE & REHABILITATION

## 2021-11-17 NOTE — PROCEDURES
[facility]ish[facility] Pal[facility] USamuel 7.    2-LEVEL LUMBAR TRANSFORAMINAL   NAME:  Anselmo Mark    MR #:    [id number] :  1955     PHYSICIAN:  Natalie Beasley MD        Operative Report    DATE OF PROCEDURE: 2021   PREOPERATIVE DIAGNOSES: 1.   pedicles. At this point in time, Omnipaque-240 contrast was used to obtain a good epidurogram indicating correct needle placement at each level. Then, aspiration was performed. No blood, fluid, or air was aspirated.   Then, the patient was injected with

## 2021-11-21 ENCOUNTER — TELEPHONE (OUTPATIENT)
Dept: INTERNAL MEDICINE CLINIC | Facility: CLINIC | Age: 66
End: 2021-11-21

## 2021-11-21 DIAGNOSIS — I63.311 CEREBROVASCULAR ACCIDENT (CVA) DUE TO THROMBOSIS OF RIGHT MIDDLE CEREBRAL ARTERY (HCC): ICD-10-CM

## 2021-11-21 RX ORDER — ATORVASTATIN CALCIUM 10 MG/1
10 TABLET, FILM COATED ORAL EVERY 24 HOURS
Qty: 90 TABLET | Refills: 3 | Status: SHIPPED | OUTPATIENT
Start: 2021-11-21

## 2021-11-21 NOTE — TELEPHONE ENCOUNTER
Please call patient and let her know that her cholesterol is well controlled with current dose of statin. Sent in refills.   Minerva Lane MD

## 2021-12-14 ENCOUNTER — OFFICE VISIT (OUTPATIENT)
Dept: PHYSICAL THERAPY | Age: 66
End: 2021-12-14
Attending: PHYSICAL MEDICINE & REHABILITATION
Payer: MEDICARE

## 2021-12-14 PROCEDURE — 97110 THERAPEUTIC EXERCISES: CPT

## 2021-12-14 NOTE — PROGRESS NOTES
Christa  Pt has attended 7 visits in Physical Therapy.      Dx: Low back pain with leg paresthesias         Insurance (Authorized # of Visits):  Medicare (10)           Authorizing Physician: Dr. Skyler Cleveland  Next MD visit: none scheduled  Fall Risk: s symptoms for 3 consecutive days to improve function with ADL. MET  · Pt will have decreased paraspinal mm tension to tolerate standing >15 minutes for work and home activities.  MET  · Pt will demonstrate improved R knee flexion to 85 deg or more for improv and MMTs pain free  Discussion of current HEP and expectations for continuing with this independently  Thorough discussion of next steps for bruising/skin changes on thigh, lingering urination issues (pain, frequency) and who to follow up with  -pt verbali

## 2021-12-15 RX ORDER — LOSARTAN POTASSIUM AND HYDROCHLOROTHIAZIDE 12.5; 1 MG/1; MG/1
1 TABLET ORAL DAILY
Qty: 90 TABLET | Refills: 1 | Status: SHIPPED | OUTPATIENT
Start: 2021-12-15

## 2021-12-26 DIAGNOSIS — D64.9 NORMOCYTIC ANEMIA: ICD-10-CM

## 2021-12-26 DIAGNOSIS — D50.8 OTHER IRON DEFICIENCY ANEMIA: ICD-10-CM

## 2021-12-26 DIAGNOSIS — E53.8 FOLIC ACID DEFICIENCY: ICD-10-CM

## 2021-12-27 RX ORDER — FOLIC ACID 1 MG/1
TABLET ORAL
Qty: 90 TABLET | Refills: 0 | Status: SHIPPED | OUTPATIENT
Start: 2021-12-27

## 2022-01-04 ENCOUNTER — HOSPITAL ENCOUNTER (OUTPATIENT)
Dept: MRI IMAGING | Facility: HOSPITAL | Age: 67
Discharge: HOME | End: 2022-01-04
Attending: ORTHOPAEDIC SURGERY
Payer: OTHER MISCELLANEOUS

## 2022-01-04 DIAGNOSIS — S43.431D GLENOID LABRAL TEAR, RIGHT, SUBSEQUENT ENCOUNTER: ICD-10-CM

## 2022-01-05 ENCOUNTER — ORDER TRANSCRIPTION (OUTPATIENT)
Dept: ADMINISTRATIVE | Facility: HOSPITAL | Age: 67
End: 2022-01-05

## 2022-01-05 DIAGNOSIS — S43.431D GLENOID LABRAL TEAR, RIGHT, SUBSEQUENT ENCOUNTER: Primary | ICD-10-CM

## 2022-01-18 ENCOUNTER — OFFICE VISIT (OUTPATIENT)
Dept: INTERNAL MEDICINE CLINIC | Facility: CLINIC | Age: 67
End: 2022-01-18
Payer: MEDICARE

## 2022-01-18 VITALS
SYSTOLIC BLOOD PRESSURE: 134 MMHG | DIASTOLIC BLOOD PRESSURE: 66 MMHG | RESPIRATION RATE: 17 BRPM | WEIGHT: 197 LBS | HEART RATE: 75 BPM | OXYGEN SATURATION: 98 % | HEIGHT: 59 IN | BODY MASS INDEX: 39.72 KG/M2

## 2022-01-18 DIAGNOSIS — Z12.31 VISIT FOR SCREENING MAMMOGRAM: ICD-10-CM

## 2022-01-18 DIAGNOSIS — H93.90 EAR LESION: Primary | ICD-10-CM

## 2022-01-18 DIAGNOSIS — E11.9 TYPE 2 DIABETES MELLITUS WITHOUT COMPLICATION, WITHOUT LONG-TERM CURRENT USE OF INSULIN (HCC): ICD-10-CM

## 2022-01-18 DIAGNOSIS — Z23 NEED FOR INFLUENZA VACCINATION: ICD-10-CM

## 2022-01-18 PROCEDURE — G0008 ADMIN INFLUENZA VIRUS VAC: HCPCS | Performed by: FAMILY MEDICINE

## 2022-01-18 PROCEDURE — 90662 IIV NO PRSV INCREASED AG IM: CPT | Performed by: FAMILY MEDICINE

## 2022-01-18 PROCEDURE — 99214 OFFICE O/P EST MOD 30 MIN: CPT | Performed by: FAMILY MEDICINE

## 2022-01-18 RX ORDER — OMEPRAZOLE 40 MG/1
40 CAPSULE, DELAYED RELEASE ORAL DAILY
Qty: 90 CAPSULE | Refills: 0 | Status: SHIPPED | OUTPATIENT
Start: 2022-01-18

## 2022-01-18 RX ORDER — FAMOTIDINE 20 MG/1
TABLET ORAL
Qty: 180 TABLET | Refills: 0 | Status: SHIPPED | OUTPATIENT
Start: 2022-01-18

## 2022-01-18 RX ORDER — FAMOTIDINE 20 MG/1
20 TABLET ORAL 2 TIMES DAILY PRN
Qty: 60 TABLET | Refills: 1 | Status: SHIPPED | OUTPATIENT
Start: 2022-01-18 | End: 2022-01-18

## 2022-01-18 NOTE — PROGRESS NOTES
PATIENT IDENTIFICATION  Name: Ang Del Valle  MRN: [id number]    Diagnoses:   Ear lesion  (primary encounter diagnosis)  Visit for screening mammogram  Type 2 diabetes mellitus without complication, without long-term current use of insulin ([facility]  Need for Tobacco Use      Smoking status: Light Tobacco Smoker        Packs/day: 0.25        Years: 47.00        Pack years: 11.75        Types: Cigarettes      Smokeless tobacco: Never Used      Tobacco comment: 1-2 CIGARETTES PER DAY    Vaping Use      Vaping Use screening mammogram  - [facility] SCREENING BILAT (CPT=77067); Future    3. Type 2 diabetes mellitus without complication, without long-term current use of insulin (HCC)  - OPHTHALMOLOGY - INTERNAL    4.  Need for influenza vaccination  - FLU VACC HIGH DOSE PRSV F

## 2022-01-24 ENCOUNTER — HOSPITAL ENCOUNTER (OUTPATIENT)
Dept: MRI IMAGING | Facility: HOSPITAL | Age: 67
Discharge: HOME | End: 2022-01-24
Attending: ORTHOPAEDIC SURGERY
Payer: MEDICARE

## 2022-01-24 ENCOUNTER — HOSPITAL ENCOUNTER (OUTPATIENT)
Dept: GENERAL RADIOLOGY | Facility: HOSPITAL | Age: 67
Discharge: HOME | End: 2022-01-24
Attending: ORTHOPAEDIC SURGERY
Payer: MEDICARE

## 2022-01-24 DIAGNOSIS — S43.431D GLENOID LABRAL TEAR, RIGHT, SUBSEQUENT ENCOUNTER: ICD-10-CM

## 2022-01-24 PROCEDURE — 77002 NEEDLE LOCALIZATION BY XRAY: CPT | Performed by: ORTHOPAEDIC SURGERY

## 2022-01-24 PROCEDURE — 23350 INJECTION FOR SHOULDER X-RAY: CPT | Performed by: ORTHOPAEDIC SURGERY

## 2022-01-24 PROCEDURE — 73222 MRI JOINT UPR EXTREM W/DYE: CPT | Performed by: ORTHOPAEDIC SURGERY

## 2022-01-24 PROCEDURE — A9575 INJ GADOTERATE MEGLUMI 0.1ML: HCPCS | Performed by: ORTHOPAEDIC SURGERY

## 2022-01-25 ENCOUNTER — OFFICE VISIT (OUTPATIENT)
Dept: OTOLARYNGOLOGY | Facility: CLINIC | Age: 67
End: 2022-01-25
Payer: MEDICARE

## 2022-01-25 VITALS — HEIGHT: 59 IN | WEIGHT: 197 LBS | TEMPERATURE: 98 F | BODY MASS INDEX: 39.72 KG/M2

## 2022-01-25 DIAGNOSIS — H92.03 OTALGIA OF BOTH EARS: Primary | ICD-10-CM

## 2022-01-25 PROCEDURE — 99203 OFFICE O/P NEW LOW 30 MIN: CPT | Performed by: OTOLARYNGOLOGY

## 2022-01-25 RX ORDER — BETAMETHASONE DIPROPIONATE 0.5 MG/G
OINTMENT TOPICAL
Qty: 15 G | Refills: 0 | Status: SHIPPED | OUTPATIENT
Start: 2022-01-25

## 2022-01-25 RX ORDER — MELOXICAM 15 MG/1
15 TABLET ORAL DAILY
Qty: 30 TABLET | Refills: 3 | Status: SHIPPED | OUTPATIENT
Start: 2022-01-25

## 2022-01-25 RX ORDER — CYCLOBENZAPRINE HCL 5 MG
5 TABLET ORAL NIGHTLY
Qty: 30 TABLET | Refills: 1 | Status: SHIPPED | OUTPATIENT
Start: 2022-01-25

## 2022-01-25 NOTE — PROGRESS NOTES
Gabriella Gonzales is a 74year old female. Patient presents with:  Ear Lesion: Pt was ref by  for ear lesion inside of left ear . Pt says she feels pressure in both ears she hears fine no other complaints .        HISTORY OF PRESENT ILLNESS  She prese Neg    • DVT/VTE Neg        Past Medical History:   Diagnosis Date   • Chronic low back pain    • Diabetes ([facility] [address] [facility]    • Esophageal reflux    • Gastritis    • High blood pressure    • High cholesterol    • Osteoarthritis    • Psoriasis    • Stroke [facility]) 2012 Ears Normal Inspection - Right: Normal, Left: Normal. Canal - Right: Normal, Left: Normal. TM - Right: Normal, Left: Normal.   Skin Normal Inspection - Normal.        Lymph Detail Normal Submental. Submandibular.  Anterior cervical. Posterior cervical. Lozano as needed for Pain., Disp: 40 tablet, Rfl: 1  •  metFORMIN HCl  MG Oral Tablet 24 Hr, TK 2 TS PO ONCE D, Disp: , Rfl:   ASSESSMENT AND PLAN    1. Otalgia of both ears  Appears to be TMJ related.   I reassured her there is no lesion within her ear Tiline

## 2022-01-29 ENCOUNTER — IMMUNIZATION (OUTPATIENT)
Dept: LAB | Facility: HOSPITAL | Age: 67
End: 2022-01-29
Attending: EMERGENCY MEDICINE
Payer: MEDICARE

## 2022-01-29 DIAGNOSIS — Z23 NEED FOR VACCINATION: Primary | ICD-10-CM

## 2022-01-29 PROCEDURE — 0054A SARSCOV2 VAC 30MCG TRS SUCR: CPT

## 2022-02-17 ENCOUNTER — OFFICE VISIT (OUTPATIENT)
Dept: INTERNAL MEDICINE CLINIC | Facility: CLINIC | Age: 67
End: 2022-02-17
Payer: OTHER MISCELLANEOUS

## 2022-02-17 VITALS
HEIGHT: 59 IN | SYSTOLIC BLOOD PRESSURE: 134 MMHG | OXYGEN SATURATION: 98 % | WEIGHT: 195 LBS | DIASTOLIC BLOOD PRESSURE: 82 MMHG | HEART RATE: 79 BPM | BODY MASS INDEX: 39.31 KG/M2

## 2022-02-17 DIAGNOSIS — Z01.818 PREOP EXAMINATION: Primary | ICD-10-CM

## 2022-02-17 DIAGNOSIS — E11.9 TYPE 2 DIABETES MELLITUS WITHOUT COMPLICATION, WITHOUT LONG-TERM CURRENT USE OF INSULIN (HCC): ICD-10-CM

## 2022-02-17 LAB
ANION GAP SERPL CALC-SCNC: 7 MMOL/L (ref 0–18)
BUN BLD-MCNC: 17 MG/DL (ref 7–18)
BUN/CREAT SERPL: 22.7 (ref 10–20)
CALCIUM BLD-MCNC: 9.6 MG/DL (ref 8.5–10.1)
CARTRIDGE LOT#: ABNORMAL NUMERIC
CHLORIDE SERPL-SCNC: 106 MMOL/L (ref 98–112)
CO2 SERPL-SCNC: 28 MMOL/L (ref 21–32)
CREAT BLD-MCNC: 0.75 MG/DL
DEPRECATED RDW RBC AUTO: 47.9 FL (ref 35.1–46.3)
ERYTHROCYTE [DISTWIDTH] IN BLOOD BY AUTOMATED COUNT: 13.7 % (ref 11–15)
FASTING STATUS PATIENT QL REPORTED: YES
GLUCOSE BLD-MCNC: 162 MG/DL (ref 70–99)
HCT VFR BLD AUTO: 40.8 %
HEMOGLOBIN A1C: 7.9 % (ref 4.3–5.6)
HGB BLD-MCNC: 12.7 G/DL
MCH RBC QN AUTO: 29.5 PG (ref 26–34)
MCHC RBC AUTO-ENTMCNC: 31.1 G/DL (ref 31–37)
MCV RBC AUTO: 94.9 FL
OSMOLALITY SERPL CALC.SUM OF ELEC: 297 MOSM/KG (ref 275–295)
PLATELET # BLD AUTO: 240 10(3)UL (ref 150–450)
POTASSIUM SERPL-SCNC: 4 MMOL/L (ref 3.5–5.1)
RBC # BLD AUTO: 4.3 X10(6)UL
SODIUM SERPL-SCNC: 141 MMOL/L (ref 136–145)
WBC # BLD AUTO: 9.7 X10(3) UL (ref 4–11)

## 2022-02-17 PROCEDURE — 3051F HG A1C>EQUAL 7.0%<8.0%: CPT | Performed by: FAMILY MEDICINE

## 2022-02-17 PROCEDURE — 85027 COMPLETE CBC AUTOMATED: CPT | Performed by: FAMILY MEDICINE

## 2022-02-17 PROCEDURE — 83036 HEMOGLOBIN GLYCOSYLATED A1C: CPT | Performed by: FAMILY MEDICINE

## 2022-02-17 PROCEDURE — 80048 BASIC METABOLIC PNL TOTAL CA: CPT | Performed by: FAMILY MEDICINE

## 2022-02-17 PROCEDURE — 99214 OFFICE O/P EST MOD 30 MIN: CPT | Performed by: FAMILY MEDICINE

## 2022-02-17 PROCEDURE — 93000 ELECTROCARDIOGRAM COMPLETE: CPT | Performed by: FAMILY MEDICINE

## 2022-02-17 PROCEDURE — 3075F SYST BP GE 130 - 139MM HG: CPT | Performed by: FAMILY MEDICINE

## 2022-02-17 PROCEDURE — 3079F DIAST BP 80-89 MM HG: CPT | Performed by: FAMILY MEDICINE

## 2022-02-17 PROCEDURE — 3008F BODY MASS INDEX DOCD: CPT | Performed by: FAMILY MEDICINE

## 2022-02-23 ENCOUNTER — TELEPHONE (OUTPATIENT)
Dept: INTERNAL MEDICINE CLINIC | Facility: CLINIC | Age: 67
End: 2022-02-23

## 2022-02-23 NOTE — TELEPHONE ENCOUNTER
Received request for medical records from [address] [place] [address]. Phone#[phone]. Request sent to STAT.

## 2022-02-28 RX ORDER — FAMOTIDINE 20 MG/1
TABLET, FILM COATED ORAL
Qty: 180 TABLET | Refills: 0 | Status: SHIPPED | OUTPATIENT
Start: 2022-02-28 | End: 2022-03-22

## 2022-03-20 ENCOUNTER — LAB ENCOUNTER (OUTPATIENT)
Dept: LAB | Facility: HOSPITAL | Age: 67
End: 2022-03-20
Attending: ORTHOPAEDIC SURGERY
Payer: MEDICARE

## 2022-03-20 DIAGNOSIS — Z01.818 PRE-OP TESTING: ICD-10-CM

## 2022-03-20 DIAGNOSIS — D64.9 NORMOCYTIC ANEMIA: ICD-10-CM

## 2022-03-20 LAB
BASOPHILS # BLD AUTO: 0.04 X10(3) UL (ref 0–0.2)
BASOPHILS NFR BLD AUTO: 0.5 %
DEPRECATED RDW RBC AUTO: 44.1 FL (ref 35.1–46.3)
EOSINOPHIL # BLD AUTO: 0.12 X10(3) UL (ref 0–0.7)
EOSINOPHIL NFR BLD AUTO: 1.4 %
ERYTHROCYTE [DISTWIDTH] IN BLOOD BY AUTOMATED COUNT: 13 % (ref 11–15)
HCT VFR BLD AUTO: 37.2 %
HGB BLD-MCNC: 11.7 G/DL
IMM GRANULOCYTES # BLD AUTO: 0.03 X10(3) UL (ref 0–1)
IMM GRANULOCYTES NFR BLD: 0.4 %
LYMPHOCYTES # BLD AUTO: 2.78 X10(3) UL (ref 1–4)
LYMPHOCYTES NFR BLD AUTO: 32.5 %
MCH RBC QN AUTO: 29 PG (ref 26–34)
MCV RBC AUTO: 92.3 FL
MONOCYTES # BLD AUTO: 0.73 X10(3) UL (ref 0.1–1)
MONOCYTES NFR BLD AUTO: 8.5 %
NEUTROPHILS # BLD AUTO: 4.86 X10 (3) UL (ref 1.5–7.7)
NEUTROPHILS # BLD AUTO: 4.86 X10(3) UL (ref 1.5–7.7)
NEUTROPHILS NFR BLD AUTO: 56.7 %
PLATELET # BLD AUTO: 297 10(3)UL (ref 150–450)
RBC # BLD AUTO: 4.03 X10(6)UL
SARS-COV-2 RNA RESP QL NAA+PROBE: NOT DETECTED
WBC # BLD AUTO: 8.6 X10(3) UL (ref 4–11)

## 2022-03-20 PROCEDURE — 85025 COMPLETE CBC W/AUTO DIFF WBC: CPT

## 2022-03-20 PROCEDURE — 36415 COLL VENOUS BLD VENIPUNCTURE: CPT

## 2022-03-21 ENCOUNTER — TELEPHONE (OUTPATIENT)
Dept: HEMATOLOGY/ONCOLOGY | Facility: HOSPITAL | Age: 67
End: 2022-03-21

## 2022-03-21 NOTE — TELEPHONE ENCOUNTER
Language Line Used Maggy Nye #[id number]    Spoke with patient and informed her that Dr Tejal Samuel would like to see her in clinic for a follow up visit. Patient informed me that she has an upcoming surgery and had to cancel her previously scheduled follow up appointment. She stated that she will call the office back to schedule a follow up appointment after she has her procedure and feel well enough to go out. I stated understanding and thanked patient for informing us of this. She was provided the number to central scheduling to call back and schedule.

## 2022-03-23 ENCOUNTER — ANESTHESIA (OUTPATIENT)
Dept: SURGERY | Facility: HOSPITAL | Age: 67
End: 2022-03-23
Payer: OTHER MISCELLANEOUS

## 2022-03-23 ENCOUNTER — HOSPITAL ENCOUNTER (OUTPATIENT)
Facility: HOSPITAL | Age: 67
Setting detail: HOSPITAL OUTPATIENT SURGERY
Discharge: HOME | End: 2022-03-23
Attending: ORTHOPAEDIC SURGERY | Admitting: ORTHOPAEDIC SURGERY
Payer: OTHER MISCELLANEOUS

## 2022-03-23 ENCOUNTER — ANESTHESIA EVENT (OUTPATIENT)
Dept: SURGERY | Facility: HOSPITAL | Age: 67
End: 2022-03-23
Payer: OTHER MISCELLANEOUS

## 2022-03-23 VITALS
TEMPERATURE: 97 F | HEART RATE: 70 BPM | DIASTOLIC BLOOD PRESSURE: 46 MMHG | BODY MASS INDEX: 38.48 KG/M2 | HEIGHT: 60 IN | WEIGHT: 196 LBS | OXYGEN SATURATION: 97 % | RESPIRATION RATE: 16 BRPM | SYSTOLIC BLOOD PRESSURE: 119 MMHG

## 2022-03-23 DIAGNOSIS — Z01.818 PRE-OP TESTING: Primary | ICD-10-CM

## 2022-03-23 DIAGNOSIS — M24.9 DERANGEMENT OF RIGHT SHOULDER JOINT: ICD-10-CM

## 2022-03-23 LAB
GLUCOSE BLDC GLUCOMTR-MCNC: 153 MG/DL (ref 70–99)
GLUCOSE BLDC GLUCOMTR-MCNC: 200 MG/DL (ref 70–99)

## 2022-03-23 PROCEDURE — 0L834ZZ DIVISION OF RIGHT UPPER ARM TENDON, PERCUTANEOUS ENDOSCOPIC APPROACH: ICD-10-PCS | Performed by: ORTHOPAEDIC SURGERY

## 2022-03-23 PROCEDURE — 0RNJ4ZZ RELEASE RIGHT SHOULDER JOINT, PERCUTANEOUS ENDOSCOPIC APPROACH: ICD-10-PCS | Performed by: ORTHOPAEDIC SURGERY

## 2022-03-23 PROCEDURE — 64415 NJX AA&/STRD BRCH PLXS IMG: CPT | Performed by: ORTHOPAEDIC SURGERY

## 2022-03-23 PROCEDURE — 0PB94ZZ EXCISION OF RIGHT CLAVICLE, PERCUTANEOUS ENDOSCOPIC APPROACH: ICD-10-PCS | Performed by: ORTHOPAEDIC SURGERY

## 2022-03-23 PROCEDURE — 76942 ECHO GUIDE FOR BIOPSY: CPT | Performed by: ORTHOPAEDIC SURGERY

## 2022-03-23 PROCEDURE — 82962 GLUCOSE BLOOD TEST: CPT

## 2022-03-23 RX ORDER — SODIUM CHLORIDE, SODIUM LACTATE, POTASSIUM CHLORIDE, CALCIUM CHLORIDE 600; 310; 30; 20 MG/100ML; MG/100ML; MG/100ML; MG/100ML
INJECTION, SOLUTION INTRAVENOUS CONTINUOUS
Status: DISCONTINUED | OUTPATIENT
Start: 2022-03-23 | End: 2022-03-23

## 2022-03-23 RX ORDER — HYDROMORPHONE HYDROCHLORIDE 1 MG/ML
0.6 INJECTION, SOLUTION INTRAMUSCULAR; INTRAVENOUS; SUBCUTANEOUS EVERY 5 MIN PRN
Status: DISCONTINUED | OUTPATIENT
Start: 2022-03-23 | End: 2022-03-23

## 2022-03-23 RX ORDER — PROCHLORPERAZINE EDISYLATE 5 MG/ML
5 INJECTION INTRAMUSCULAR; INTRAVENOUS ONCE AS NEEDED
Status: DISCONTINUED | OUTPATIENT
Start: 2022-03-23 | End: 2022-03-23

## 2022-03-23 RX ORDER — ROPIVACAINE HYDROCHLORIDE 5 MG/ML
INJECTION, SOLUTION EPIDURAL; INFILTRATION; PERINEURAL
Status: COMPLETED | OUTPATIENT
Start: 2022-03-23 | End: 2022-03-23

## 2022-03-23 RX ORDER — TRAMADOL HYDROCHLORIDE 50 MG/1
TABLET ORAL EVERY 4 HOURS PRN
Qty: 30 TABLET | Refills: 0 | Status: SHIPPED | OUTPATIENT
Start: 2022-03-23

## 2022-03-23 RX ORDER — NALOXONE HYDROCHLORIDE 0.4 MG/ML
80 INJECTION, SOLUTION INTRAMUSCULAR; INTRAVENOUS; SUBCUTANEOUS AS NEEDED
Status: DISCONTINUED | OUTPATIENT
Start: 2022-03-23 | End: 2022-03-23

## 2022-03-23 RX ORDER — HYDROMORPHONE HYDROCHLORIDE 1 MG/ML
0.2 INJECTION, SOLUTION INTRAMUSCULAR; INTRAVENOUS; SUBCUTANEOUS EVERY 5 MIN PRN
Status: DISCONTINUED | OUTPATIENT
Start: 2022-03-23 | End: 2022-03-23

## 2022-03-23 RX ORDER — MORPHINE SULFATE 4 MG/ML
4 INJECTION, SOLUTION INTRAMUSCULAR; INTRAVENOUS EVERY 10 MIN PRN
Status: DISCONTINUED | OUTPATIENT
Start: 2022-03-23 | End: 2022-03-23

## 2022-03-23 RX ORDER — METOCLOPRAMIDE 10 MG/1
10 TABLET ORAL ONCE
Status: COMPLETED | OUTPATIENT
Start: 2022-03-23 | End: 2022-03-23

## 2022-03-23 RX ORDER — ACETAMINOPHEN 500 MG
1000 TABLET ORAL ONCE
Status: COMPLETED | OUTPATIENT
Start: 2022-03-23 | End: 2022-03-23

## 2022-03-23 RX ORDER — PROCHLORPERAZINE EDISYLATE 5 MG/ML
5 INJECTION INTRAMUSCULAR; INTRAVENOUS ONCE AS NEEDED
Status: COMPLETED | OUTPATIENT
Start: 2022-03-23 | End: 2022-03-23

## 2022-03-23 RX ORDER — HYDROMORPHONE HYDROCHLORIDE 1 MG/ML
0.4 INJECTION, SOLUTION INTRAMUSCULAR; INTRAVENOUS; SUBCUTANEOUS EVERY 5 MIN PRN
Status: DISCONTINUED | OUTPATIENT
Start: 2022-03-23 | End: 2022-03-23

## 2022-03-23 RX ORDER — HALOPERIDOL 5 MG/ML
0.25 INJECTION INTRAMUSCULAR ONCE AS NEEDED
Status: DISCONTINUED | OUTPATIENT
Start: 2022-03-23 | End: 2022-03-23

## 2022-03-23 RX ORDER — ONDANSETRON 2 MG/ML
INJECTION INTRAMUSCULAR; INTRAVENOUS AS NEEDED
Status: DISCONTINUED | OUTPATIENT
Start: 2022-03-23 | End: 2022-03-23 | Stop reason: SURG

## 2022-03-23 RX ORDER — DEXAMETHASONE SODIUM PHOSPHATE 4 MG/ML
VIAL (ML) INJECTION AS NEEDED
Status: DISCONTINUED | OUTPATIENT
Start: 2022-03-23 | End: 2022-03-23 | Stop reason: SURG

## 2022-03-23 RX ORDER — FAMOTIDINE 20 MG/1
20 TABLET, FILM COATED ORAL ONCE
Status: DISCONTINUED | OUTPATIENT
Start: 2022-03-23 | End: 2022-03-23 | Stop reason: HOSPADM

## 2022-03-23 RX ORDER — NEOSTIGMINE METHYLSULFATE 1 MG/ML
INJECTION INTRAVENOUS AS NEEDED
Status: DISCONTINUED | OUTPATIENT
Start: 2022-03-23 | End: 2022-03-23 | Stop reason: SURG

## 2022-03-23 RX ORDER — CEFAZOLIN SODIUM/WATER 2 G/20 ML
2 SYRINGE (ML) INTRAVENOUS ONCE
Status: COMPLETED | OUTPATIENT
Start: 2022-03-23 | End: 2022-03-23

## 2022-03-23 RX ORDER — GLYCOPYRROLATE 0.2 MG/ML
INJECTION, SOLUTION INTRAMUSCULAR; INTRAVENOUS AS NEEDED
Status: DISCONTINUED | OUTPATIENT
Start: 2022-03-23 | End: 2022-03-23 | Stop reason: SURG

## 2022-03-23 RX ORDER — ONDANSETRON 2 MG/ML
4 INJECTION INTRAMUSCULAR; INTRAVENOUS ONCE AS NEEDED
Status: COMPLETED | OUTPATIENT
Start: 2022-03-23 | End: 2022-03-23

## 2022-03-23 RX ORDER — NICOTINE POLACRILEX 4 MG
30 LOZENGE BUCCAL
Status: DISCONTINUED | OUTPATIENT
Start: 2022-03-23 | End: 2022-03-23

## 2022-03-23 RX ORDER — ROCURONIUM BROMIDE 10 MG/ML
INJECTION, SOLUTION INTRAVENOUS AS NEEDED
Status: DISCONTINUED | OUTPATIENT
Start: 2022-03-23 | End: 2022-03-23 | Stop reason: SURG

## 2022-03-23 RX ORDER — NICOTINE POLACRILEX 4 MG
15 LOZENGE BUCCAL
Status: DISCONTINUED | OUTPATIENT
Start: 2022-03-23 | End: 2022-03-23

## 2022-03-23 RX ORDER — MORPHINE SULFATE 4 MG/ML
2 INJECTION, SOLUTION INTRAMUSCULAR; INTRAVENOUS EVERY 10 MIN PRN
Status: DISCONTINUED | OUTPATIENT
Start: 2022-03-23 | End: 2022-03-23

## 2022-03-23 RX ORDER — LIDOCAINE HYDROCHLORIDE 10 MG/ML
INJECTION, SOLUTION EPIDURAL; INFILTRATION; INTRACAUDAL; PERINEURAL AS NEEDED
Status: DISCONTINUED | OUTPATIENT
Start: 2022-03-23 | End: 2022-03-23 | Stop reason: SURG

## 2022-03-23 RX ORDER — ONDANSETRON 2 MG/ML
4 INJECTION INTRAMUSCULAR; INTRAVENOUS ONCE AS NEEDED
Status: DISCONTINUED | OUTPATIENT
Start: 2022-03-23 | End: 2022-03-23

## 2022-03-23 RX ORDER — HYDROCODONE BITARTRATE AND ACETAMINOPHEN 5; 325 MG/1; MG/1
1 TABLET ORAL AS NEEDED
Status: DISCONTINUED | OUTPATIENT
Start: 2022-03-23 | End: 2022-03-23

## 2022-03-23 RX ORDER — DEXTROSE MONOHYDRATE 25 G/50ML
50 INJECTION, SOLUTION INTRAVENOUS
Status: DISCONTINUED | OUTPATIENT
Start: 2022-03-23 | End: 2022-03-23

## 2022-03-23 RX ORDER — MORPHINE SULFATE 10 MG/ML
6 INJECTION, SOLUTION INTRAMUSCULAR; INTRAVENOUS EVERY 10 MIN PRN
Status: DISCONTINUED | OUTPATIENT
Start: 2022-03-23 | End: 2022-03-23

## 2022-03-23 RX ORDER — HYDROCODONE BITARTRATE AND ACETAMINOPHEN 5; 325 MG/1; MG/1
2 TABLET ORAL AS NEEDED
Status: DISCONTINUED | OUTPATIENT
Start: 2022-03-23 | End: 2022-03-23

## 2022-03-23 RX ADMIN — ONDANSETRON 4 MG: 2 INJECTION INTRAMUSCULAR; INTRAVENOUS at 07:53:00

## 2022-03-23 RX ADMIN — ROPIVACAINE HYDROCHLORIDE 30 ML: 5 INJECTION, SOLUTION EPIDURAL; INFILTRATION; PERINEURAL at 07:05:00

## 2022-03-23 RX ADMIN — CEFAZOLIN SODIUM/WATER 2 G: 2 G/20 ML SYRINGE (ML) INTRAVENOUS at 08:00:00

## 2022-03-23 RX ADMIN — DEXAMETHASONE SODIUM PHOSPHATE 4 MG: 4 MG/ML VIAL (ML) INJECTION at 07:53:00

## 2022-03-23 RX ADMIN — LIDOCAINE HYDROCHLORIDE 25 MG: 10 INJECTION, SOLUTION EPIDURAL; INFILTRATION; INTRACAUDAL; PERINEURAL at 07:45:00

## 2022-03-23 RX ADMIN — SODIUM CHLORIDE, SODIUM LACTATE, POTASSIUM CHLORIDE, CALCIUM CHLORIDE: 600; 310; 30; 20 INJECTION, SOLUTION INTRAVENOUS at 08:52:00

## 2022-03-23 RX ADMIN — SODIUM CHLORIDE, SODIUM LACTATE, POTASSIUM CHLORIDE, CALCIUM CHLORIDE: 600; 310; 30; 20 INJECTION, SOLUTION INTRAVENOUS at 07:39:00

## 2022-03-23 RX ADMIN — ROCURONIUM BROMIDE 30 MG: 10 INJECTION, SOLUTION INTRAVENOUS at 07:45:00

## 2022-03-23 RX ADMIN — NEOSTIGMINE METHYLSULFATE 4 MG: 1 INJECTION INTRAVENOUS at 08:56:00

## 2022-03-23 RX ADMIN — GLYCOPYRROLATE 0.6 MG: 0.2 INJECTION, SOLUTION INTRAMUSCULAR; INTRAVENOUS at 08:56:00

## 2022-03-23 NOTE — ANESTHESIA PROCEDURE NOTES
Airway  Date/Time: 3/23/2022 7:48 AM  Urgency: Elective    Airway not difficult    General Information and Staff    Patient location during procedure: OR  Anesthesiologist: Conchita Sousa MD  Resident/CRNA: Yuli Solorzano CRNA  Performed: CRNA     Indications and Patient Condition  Indications for airway management: anesthesia  Sedation level: deep  Preoxygenated: yes  Patient position: sniffing  Mask difficulty assessment: 1 - vent by mask    Final Airway Details  Final airway type: endotracheal airway      Successful airway: ETT  Cuffed: yes   Successful intubation technique: Video laryngoscopy  Endotracheal tube insertion site: oral  Blade: GlideScope  Blade size: #3  ETT size (mm): 7.0    Cormack-Lehane Classification: grade I - full view of glottis  Placement verified by: chest auscultation and capnometry   Measured from: lips  ETT to lips (cm): 21  Number of attempts at approach: 1    Additional Comments  Neck maintained in neutral position throughout induction and intubation.

## 2022-03-23 NOTE — BRIEF OP NOTE
Pre-Operative Diagnosis: Right shoulder glenoid labral tear     Post-Operative Diagnosis: Right shoulder glenoid labral tear      Procedure Performed:   right shoulder arthroscopy, labral repair or debridement, possible biceps tenotomy, possible rotator cuff repair or debridement, subacromial decompression, possible distal clavicle excision    Surgeon(s) and Role:     * Zoran Reyes MD - Primary     * Angélica Daniel MD    Assistant(s):        Surgical Findings: SEE OP REPORT     Specimen: NONE     Estimated Blood Loss: No data recorded    Dictation Number:      Holley Guerin MD  3/23/2022  9:00 AM

## 2022-03-23 NOTE — ANESTHESIA PROCEDURE NOTES
Peripheral Block  Performed by: Governor Prasanth MD  Authorized by: Governor Prasanth MD       General Information and Staff    Start Time:  3/23/2022 7:05 AM  End Time:  3/23/2022 7:05 AM  Anesthesiologist:  Governor Prasanth MD  Performed by: Anesthesiologist  Patient Location:  PACU    Block Placement: Pre Induction  Site Identification: real time ultrasound guided and image stored and retrievable      Reason for Block: at surgeon's request and post-op pain management    Preanesthetic Checklist: 2 patient identifers, IV checked, risks and benefits discussed, monitors and equipment checked, pre-op evaluation, timeout performed, anesthesia consent and sterile technique used      Procedure Details    Patient Position:  Sitting  Prep: ChloraPrep    Monitoring:  Cardiac monitor  Block Type:   Interscalene  Laterality:  Right  Injection Technique:  Single-shot    Needle    Needle Type:  Short-bevel  Needle Gauge:  22 G  Needle Length:  50 mm  Needle Localization:  Ultrasound guidance              Assessment    Injection Assessment:  Good spread noted, incremental injection, local visualized surrounding nerve on ultrasound, low pressure, negative aspiration for heme and no pain on injection  Heart Rate Change: No        Medications    ropivacaine (NAROPIN) injection 0.5%, 30 mL    Additional Comments

## 2022-03-23 NOTE — INTERVAL H&P NOTE
Pre-op Diagnosis: Right shoulder glenoid labral tear    The above referenced H&P was reviewed by Jerrell Rodriguez MD on 3/23/2022, the patient was examined and no significant changes have occurred in the patient's condition since the H&P was performed. I discussed with the patient and/or legal representative the potential benefits, risks and side effects of this procedure; the likelihood of the patient achieving goals; and potential problems that might occur during recuperation. I discussed reasonable alternatives to the procedure, including risks, benefits and side effects related to the alternatives and risks related to not receiving this procedure. We will proceed with procedure as planned.

## 2022-03-23 NOTE — DISCHARGE SUMMARY
Outpatient Surgery Brief Discharge Summary         Patient ID:  Trang Saleh  [id number]  74year old  9/5/1955    Discharge Diagnoses: Right shoulder glenoid labral tear    Procedures: [unfilled]    Discharged Condition: stable    Disposition: home    Patient Instructions: Follow-up with Michelle Pompa MD in 1-2 weeks. Diet: regular diet  Activity: NO USE RUE. RUE SLING AT ALL TIMES. MAY REMOVE BRIEFLY FOR ADLS ONLY.     [unfilled]  3/23/2022  9:01 AM

## 2022-03-23 NOTE — ANESTHESIA POSTPROCEDURE EVALUATION
Patient: Mary Esparza    Procedure Summary     Date: 03/23/22 Room / Location: [address] [place] [address] MAIN OR 10 / [address] [place] [address] MAIN OR    Anesthesia Start: 7074 Anesthesia Stop: 0190    Procedure: right shoulder arthroscopy, biceps tenotomy, debridement, subacromial decompression, distal clavicle excision, acromioplasty (N/A Shoulder) Diagnosis: (Right shoulder glenoid labral tear)    Surgeons: Lety Escalante MD Anesthesiologist: Sally Bryan MD    Anesthesia Type: general ASA Status: 2          Anesthesia Type: general    Vitals Value Taken Time   /65 03/23/22 0910   Temp 97.7 03/23/22 0910   Pulse 89 03/23/22 0910   Resp 17 03/23/22 0910   SpO2 98 % 03/23/22 0910   Vitals shown include unvalidated device data.     [address] [place] [address] AN Post Evaluation:   Patient Evaluated in PACU  Patient Participation: complete - patient participated  Level of Consciousness: awake  Pain Management: adequate  Airway Patency:patent  Dental exam unchanged from preop  Yes    Cardiovascular Status: acceptable  Respiratory Status: acceptable  Postoperative Hydration acceptable      FLORIAN Shepard CRNA  3/23/2022 9:10 AM

## 2022-04-21 ENCOUNTER — APPOINTMENT (OUTPATIENT)
Dept: HEMATOLOGY/ONCOLOGY | Facility: HOSPITAL | Age: 67
End: 2022-04-21
Attending: INTERNAL MEDICINE
Payer: MEDICARE

## 2022-05-02 ENCOUNTER — NURSE ONLY (OUTPATIENT)
Dept: HEMATOLOGY/ONCOLOGY | Facility: HOSPITAL | Age: 67
End: 2022-05-02
Attending: INTERNAL MEDICINE
Payer: MEDICARE

## 2022-05-02 ENCOUNTER — TELEPHONE (OUTPATIENT)
Dept: HEMATOLOGY/ONCOLOGY | Facility: HOSPITAL | Age: 67
End: 2022-05-02

## 2022-05-02 VITALS
TEMPERATURE: 98 F | DIASTOLIC BLOOD PRESSURE: 59 MMHG | HEIGHT: 60 IN | OXYGEN SATURATION: 97 % | BODY MASS INDEX: 39.27 KG/M2 | SYSTOLIC BLOOD PRESSURE: 124 MMHG | RESPIRATION RATE: 16 BRPM | HEART RATE: 86 BPM | WEIGHT: 200 LBS

## 2022-05-02 DIAGNOSIS — D64.9 NORMOCYTIC ANEMIA: Primary | ICD-10-CM

## 2022-05-02 DIAGNOSIS — E53.8 FOLIC ACID DEFICIENCY: ICD-10-CM

## 2022-05-02 LAB — FOLATE SERPL-MCNC: 7.8 NG/ML (ref 8.7–?)

## 2022-05-02 PROCEDURE — 99213 OFFICE O/P EST LOW 20 MIN: CPT | Performed by: INTERNAL MEDICINE

## 2022-05-02 PROCEDURE — 36415 COLL VENOUS BLD VENIPUNCTURE: CPT

## 2022-05-02 NOTE — TELEPHONE ENCOUNTER
Used language line Shon Russ ([id number]) to call and let patient know her folic acid level was still a little low and that we are recommending that patient continue to take Folic Acid and to follow up with us in 1 year. Patient was not available a message was left on her VM with the information above. Ivermectin Pregnancy And Lactation Text: This medication is Pregnancy Category C and it isn't known if it is safe during pregnancy. It is also excreted in breast milk.

## 2022-05-19 RX ORDER — LOSARTAN POTASSIUM AND HYDROCHLOROTHIAZIDE 12.5; 1 MG/1; MG/1
1 TABLET ORAL DAILY
Qty: 90 TABLET | Refills: 1 | Status: SHIPPED | OUTPATIENT
Start: 2022-05-19

## 2022-06-20 ENCOUNTER — OFFICE VISIT (OUTPATIENT)
Dept: INTERNAL MEDICINE CLINIC | Facility: CLINIC | Age: 67
End: 2022-06-20
Payer: MEDICARE

## 2022-06-20 ENCOUNTER — TELEPHONE (OUTPATIENT)
Dept: INTERNAL MEDICINE CLINIC | Facility: CLINIC | Age: 67
End: 2022-06-20

## 2022-06-20 VITALS
HEART RATE: 82 BPM | WEIGHT: 192.63 LBS | SYSTOLIC BLOOD PRESSURE: 130 MMHG | OXYGEN SATURATION: 97 % | BODY MASS INDEX: 37.82 KG/M2 | DIASTOLIC BLOOD PRESSURE: 80 MMHG | HEIGHT: 60 IN

## 2022-06-20 DIAGNOSIS — E11.9 TYPE 2 DIABETES MELLITUS WITHOUT COMPLICATION, WITHOUT LONG-TERM CURRENT USE OF INSULIN (HCC): Primary | ICD-10-CM

## 2022-06-20 DIAGNOSIS — I10 ESSENTIAL HYPERTENSION: Chronic | ICD-10-CM

## 2022-06-20 DIAGNOSIS — E78.5 HYPERLIPIDEMIA, UNSPECIFIED HYPERLIPIDEMIA TYPE: Chronic | ICD-10-CM

## 2022-06-20 DIAGNOSIS — R30.0 DYSURIA: ICD-10-CM

## 2022-06-20 DIAGNOSIS — J02.9 SORE THROAT: ICD-10-CM

## 2022-06-20 PROBLEM — E66.01 MORBID (SEVERE) OBESITY DUE TO EXCESS CALORIES (HCC): Status: ACTIVE | Noted: 2022-06-20

## 2022-06-20 LAB
BILIRUB UR QL: NEGATIVE
CARTRIDGE LOT#: 962 NUMERIC
CLARITY UR: CLEAR
COLOR UR: YELLOW
CREAT UR-SCNC: 42.2 MG/DL
GLUCOSE UR-MCNC: >=1000 MG/DL
HEMOGLOBIN A1C: 9.5 % (ref 4.3–5.6)
KETONES UR-MCNC: NEGATIVE MG/DL
MICROALBUMIN UR-MCNC: <0.5 MG/DL
NITRITE UR QL STRIP.AUTO: NEGATIVE
PH UR: 6.5 [PH] (ref 5–8)
PROT UR-MCNC: NEGATIVE MG/DL
SP GR UR STRIP: 1.01 (ref 1–1.03)
UROBILINOGEN UR STRIP-ACNC: 0.2

## 2022-06-20 PROCEDURE — 81001 URINALYSIS AUTO W/SCOPE: CPT | Performed by: FAMILY MEDICINE

## 2022-06-20 PROCEDURE — 82043 UR ALBUMIN QUANTITATIVE: CPT | Performed by: FAMILY MEDICINE

## 2022-06-20 PROCEDURE — 3061F NEG MICROALBUMINURIA REV: CPT | Performed by: FAMILY MEDICINE

## 2022-06-20 PROCEDURE — 87086 URINE CULTURE/COLONY COUNT: CPT | Performed by: FAMILY MEDICINE

## 2022-06-20 PROCEDURE — 82570 ASSAY OF URINE CREATININE: CPT | Performed by: FAMILY MEDICINE

## 2022-06-20 NOTE — ASSESSMENT & PLAN NOTE
Hyperlipidemia, currently on atorvastatin 10 mg daily. Last lipid panel checked in November 2021. Continue atorvastatin 10 mg daily.

## 2022-06-20 NOTE — ASSESSMENT & PLAN NOTE
At the end of the examination, patient also incidentally mentioned that she is having some burning sensation when he is urinating for a couple days. Will check a urinalysis.

## 2022-06-20 NOTE — TELEPHONE ENCOUNTER
Pharmacy called regarding a refill for patient for her     Metformin 500. Patient is completely out     Pharmacist says maybe she can be switched to metformin ER because she was taking it 2x a day & that caused her to have GI issues. Please advise?

## 2022-06-22 DIAGNOSIS — R31.29 MICROSCOPIC HEMATURIA: Primary | ICD-10-CM

## 2022-06-23 ENCOUNTER — OFFICE VISIT (OUTPATIENT)
Dept: OTOLARYNGOLOGY | Facility: CLINIC | Age: 67
End: 2022-06-23
Payer: MEDICARE

## 2022-06-23 VITALS — HEIGHT: 60 IN | BODY MASS INDEX: 37.69 KG/M2 | WEIGHT: 192 LBS

## 2022-06-23 DIAGNOSIS — J38.7 LARYNGEAL CYST: ICD-10-CM

## 2022-06-23 DIAGNOSIS — R13.10 ODYNOPHAGIA: Primary | ICD-10-CM

## 2022-06-23 PROCEDURE — 99213 OFFICE O/P EST LOW 20 MIN: CPT | Performed by: SPECIALIST

## 2022-06-23 PROCEDURE — 3008F BODY MASS INDEX DOCD: CPT | Performed by: SPECIALIST

## 2022-06-23 RX ORDER — AMOXICILLIN AND CLAVULANATE POTASSIUM 875; 125 MG/1; MG/1
1 TABLET, FILM COATED ORAL EVERY 12 HOURS
Qty: 20 TABLET | Refills: 0 | Status: SHIPPED | OUTPATIENT
Start: 2022-06-23

## 2022-06-23 RX ORDER — MELOXICAM 15 MG/1
15 TABLET ORAL DAILY
COMMUNITY
Start: 2022-05-12

## 2022-06-23 NOTE — PATIENT INSTRUCTIONS
Mirror examination of your larynx showed some increased tissue in the right aryepiglottic area. There was a whitish appearing cyst in the area as well. I placed you on a trial of Augmentin. I asked you to follow-up in 1 week's time, sooner if problems.

## 2022-07-11 ENCOUNTER — HOSPITAL ENCOUNTER (OUTPATIENT)
Dept: MRI IMAGING | Facility: HOSPITAL | Age: 67
Discharge: HOME | End: 2022-07-11
Attending: ORTHOPAEDIC SURGERY
Payer: OTHER MISCELLANEOUS

## 2022-07-11 ENCOUNTER — HOSPITAL ENCOUNTER (OUTPATIENT)
Dept: GENERAL RADIOLOGY | Facility: HOSPITAL | Age: 67
Discharge: HOME | End: 2022-07-11
Attending: ORTHOPAEDIC SURGERY
Payer: OTHER MISCELLANEOUS

## 2022-07-11 DIAGNOSIS — M75.01 ADHESIVE CAPSULITIS OF RIGHT SHOULDER: ICD-10-CM

## 2022-07-11 PROCEDURE — 23350 INJECTION FOR SHOULDER X-RAY: CPT | Performed by: ORTHOPAEDIC SURGERY

## 2022-07-11 PROCEDURE — 73222 MRI JOINT UPR EXTREM W/DYE: CPT | Performed by: ORTHOPAEDIC SURGERY

## 2022-07-11 PROCEDURE — A9575 INJ GADOTERATE MEGLUMI 0.1ML: HCPCS | Performed by: ORTHOPAEDIC SURGERY

## 2022-07-11 PROCEDURE — 77002 NEEDLE LOCALIZATION BY XRAY: CPT | Performed by: ORTHOPAEDIC SURGERY

## 2022-07-11 RX ORDER — LIDOCAINE HYDROCHLORIDE 10 MG/ML
INJECTION, SOLUTION EPIDURAL; INFILTRATION; INTRACAUDAL; PERINEURAL
Status: COMPLETED
Start: 2022-07-11 | End: 2022-07-11

## 2022-07-11 RX ORDER — LIDOCAINE HYDROCHLORIDE 10 MG/ML
5 INJECTION, SOLUTION EPIDURAL; INFILTRATION; INTRACAUDAL; PERINEURAL ONCE
Status: COMPLETED | OUTPATIENT
Start: 2022-07-11 | End: 2022-07-11

## 2022-07-11 RX ADMIN — LIDOCAINE HYDROCHLORIDE 5 ML: 10 INJECTION, SOLUTION EPIDURAL; INFILTRATION; INTRACAUDAL; PERINEURAL at 09:58:00

## 2022-07-11 RX ADMIN — LIDOCAINE HYDROCHLORIDE 5 ML: 10 INJECTION, SOLUTION EPIDURAL; INFILTRATION; INTRACAUDAL; PERINEURAL at 09:54:00

## 2022-07-28 ENCOUNTER — OFFICE VISIT (OUTPATIENT)
Dept: SURGERY | Facility: CLINIC | Age: 67
End: 2022-07-28
Payer: MEDICARE

## 2022-07-28 VITALS — SYSTOLIC BLOOD PRESSURE: 97 MMHG | DIASTOLIC BLOOD PRESSURE: 65 MMHG | HEART RATE: 82 BPM

## 2022-07-28 DIAGNOSIS — R10.31 RIGHT LOWER QUADRANT ABDOMINAL PAIN: ICD-10-CM

## 2022-07-28 DIAGNOSIS — R31.0 GROSS HEMATURIA: Primary | ICD-10-CM

## 2022-07-28 PROCEDURE — 3078F DIAST BP <80 MM HG: CPT | Performed by: NURSE PRACTITIONER

## 2022-07-28 PROCEDURE — 99204 OFFICE O/P NEW MOD 45 MIN: CPT | Performed by: NURSE PRACTITIONER

## 2022-07-28 PROCEDURE — 3074F SYST BP LT 130 MM HG: CPT | Performed by: NURSE PRACTITIONER

## 2022-07-29 ENCOUNTER — TELEPHONE (OUTPATIENT)
Dept: INTERNAL MEDICINE CLINIC | Facility: CLINIC | Age: 67
End: 2022-07-29

## 2022-07-29 LAB — NON GYNE INTERPRETATION: NEGATIVE

## 2022-07-29 NOTE — TELEPHONE ENCOUNTER
Received Subpoena by fax requesting Medical Records for pt to be faxed or mailed:  Shahbaz Ramirez of [employer], [address]   [address] [facility], [address] [place] [address], [address] [place] [address] Street  Fax [phone]  Sent to [employer]

## 2022-08-01 ENCOUNTER — TELEPHONE (OUTPATIENT)
Dept: SURGERY | Facility: CLINIC | Age: 67
End: 2022-08-01

## 2022-08-04 ENCOUNTER — TELEPHONE (OUTPATIENT)
Dept: SURGERY | Facility: CLINIC | Age: 67
End: 2022-08-04

## 2022-08-04 NOTE — TELEPHONE ENCOUNTER
Received fax from [facility] with additional clinical information for CPT codes 94195 (3 Dimensional (3D) pictures), cpt codes 25432 (computed tomography (CT), a special kind of picture if your abdomen (stomach area) and pelvis without and with contrast (dye). Faxed last office visit notes, and imaging results. FAX # [phone].

## 2022-08-05 ENCOUNTER — HOSPITAL ENCOUNTER (OUTPATIENT)
Dept: CT IMAGING | Facility: HOSPITAL | Age: 67
Discharge: HOME | End: 2022-08-05
Attending: NURSE PRACTITIONER
Payer: OTHER MISCELLANEOUS

## 2022-08-05 DIAGNOSIS — R31.0 GROSS HEMATURIA: ICD-10-CM

## 2022-08-05 DIAGNOSIS — R10.31 RIGHT LOWER QUADRANT ABDOMINAL PAIN: ICD-10-CM

## 2022-08-05 LAB
CREAT BLD-MCNC: 0.6 MG/DL
GFR SERPLBLD BASED ON 1.73 SQ M-ARVRAT: 98 ML/MIN/1.73M2 (ref 60–?)

## 2022-08-05 PROCEDURE — 82565 ASSAY OF CREATININE: CPT

## 2022-08-05 PROCEDURE — 74178 CT ABD&PLV WO CNTR FLWD CNTR: CPT | Performed by: NURSE PRACTITIONER

## 2022-08-05 PROCEDURE — 76377 3D RENDER W/INTRP POSTPROCES: CPT | Performed by: NURSE PRACTITIONER

## 2022-08-09 NOTE — TELEPHONE ENCOUNTER
Received approval for CPT 76456, 3 Dimensional (3D) pictures. Approved from 8/5/22-2/1/23. Copy sent to scanning.

## 2022-08-29 DIAGNOSIS — D17.71 ANGIOMYOLIPOMA OF RIGHT KIDNEY: Primary | ICD-10-CM

## 2022-08-31 DIAGNOSIS — E11.9 TYPE 2 DIABETES MELLITUS WITHOUT COMPLICATION, WITHOUT LONG-TERM CURRENT USE OF INSULIN (HCC): ICD-10-CM

## 2022-08-31 NOTE — TELEPHONE ENCOUNTER
Please call patient and remind her to schedule a follow up appointment with me sometime after 9/22/22.     Thank you,  Kvng Barrios

## 2022-09-15 ENCOUNTER — PROCEDURE (OUTPATIENT)
Dept: SURGERY | Facility: CLINIC | Age: 67
End: 2022-09-15
Payer: MEDICARE

## 2022-09-15 VITALS — DIASTOLIC BLOOD PRESSURE: 73 MMHG | HEART RATE: 79 BPM | SYSTOLIC BLOOD PRESSURE: 124 MMHG

## 2022-09-15 DIAGNOSIS — R31.0 GROSS HEMATURIA: Primary | ICD-10-CM

## 2022-09-15 DIAGNOSIS — D17.71 ANGIOMYOLIPOMA OF RIGHT KIDNEY: ICD-10-CM

## 2022-09-15 PROCEDURE — 52000 CYSTOURETHROSCOPY: CPT | Performed by: UROLOGY

## 2022-09-15 PROCEDURE — 3078F DIAST BP <80 MM HG: CPT | Performed by: UROLOGY

## 2022-09-15 PROCEDURE — 3074F SYST BP LT 130 MM HG: CPT | Performed by: UROLOGY

## 2022-09-15 PROCEDURE — 99214 OFFICE O/P EST MOD 30 MIN: CPT | Performed by: UROLOGY

## 2022-09-15 RX ORDER — CIPROFLOXACIN 500 MG/1
500 TABLET, FILM COATED ORAL ONCE
Status: COMPLETED | OUTPATIENT
Start: 2022-09-15 | End: 2022-09-15

## 2022-09-15 RX ADMIN — CIPROFLOXACIN 500 MG: 500 TABLET, FILM COATED ORAL at 15:29:00

## 2022-09-16 PROBLEM — D17.71 ANGIOMYOLIPOMA OF RIGHT KIDNEY: Status: ACTIVE | Noted: 2022-09-16

## 2022-09-30 ENCOUNTER — LAB ENCOUNTER (OUTPATIENT)
Dept: LAB | Facility: HOSPITAL | Age: 67
End: 2022-09-30
Attending: RADIOLOGY
Payer: MEDICARE

## 2022-09-30 ENCOUNTER — NURSE ONLY (OUTPATIENT)
Dept: LAB | Facility: HOSPITAL | Age: 67
End: 2022-09-30
Attending: RADIOLOGY
Payer: MEDICARE

## 2022-09-30 DIAGNOSIS — Z01.818 PRE-OP TESTING: ICD-10-CM

## 2022-09-30 LAB
ANION GAP SERPL CALC-SCNC: 5 MMOL/L (ref 0–18)
BASOPHILS # BLD AUTO: 0.03 X10(3) UL (ref 0–0.2)
BASOPHILS NFR BLD AUTO: 0.5 %
BUN BLD-MCNC: 16 MG/DL (ref 7–18)
BUN/CREAT SERPL: 24.6 (ref 10–20)
CALCIUM BLD-MCNC: 9.7 MG/DL (ref 8.5–10.1)
CHLORIDE SERPL-SCNC: 106 MMOL/L (ref 98–112)
CO2 SERPL-SCNC: 29 MMOL/L (ref 21–32)
CREAT BLD-MCNC: 0.65 MG/DL
DEPRECATED RDW RBC AUTO: 43.5 FL (ref 35.1–46.3)
EOSINOPHIL # BLD AUTO: 0.16 X10(3) UL (ref 0–0.7)
EOSINOPHIL NFR BLD AUTO: 2.5 %
ERYTHROCYTE [DISTWIDTH] IN BLOOD BY AUTOMATED COUNT: 12.8 % (ref 11–15)
FASTING STATUS PATIENT QL REPORTED: NO
GFR SERPLBLD BASED ON 1.73 SQ M-ARVRAT: 95 ML/MIN/1.73M2 (ref 60–?)
GLUCOSE BLD-MCNC: 114 MG/DL (ref 70–99)
HCT VFR BLD AUTO: 38.4 %
HGB BLD-MCNC: 11.9 G/DL
IMM GRANULOCYTES # BLD AUTO: 0.01 X10(3) UL (ref 0–1)
IMM GRANULOCYTES NFR BLD: 0.2 %
LYMPHOCYTES # BLD AUTO: 2.58 X10(3) UL (ref 1–4)
LYMPHOCYTES NFR BLD AUTO: 39.7 %
MCH RBC QN AUTO: 28.6 PG (ref 26–34)
MCHC RBC AUTO-ENTMCNC: 31 G/DL (ref 31–37)
MCV RBC AUTO: 92.3 FL
MONOCYTES # BLD AUTO: 0.54 X10(3) UL (ref 0.1–1)
MONOCYTES NFR BLD AUTO: 8.3 %
NEUTROPHILS # BLD AUTO: 3.18 X10 (3) UL (ref 1.5–7.7)
NEUTROPHILS # BLD AUTO: 3.18 X10(3) UL (ref 1.5–7.7)
NEUTROPHILS NFR BLD AUTO: 48.8 %
OSMOLALITY SERPL CALC.SUM OF ELEC: 292 MOSM/KG (ref 275–295)
PLATELET # BLD AUTO: 254 10(3)UL (ref 150–450)
POTASSIUM SERPL-SCNC: 3.6 MMOL/L (ref 3.5–5.1)
RBC # BLD AUTO: 4.16 X10(6)UL
SARS-COV-2 RNA RESP QL NAA+PROBE: NOT DETECTED
SODIUM SERPL-SCNC: 140 MMOL/L (ref 136–145)
WBC # BLD AUTO: 6.5 X10(3) UL (ref 4–11)

## 2022-09-30 PROCEDURE — 36415 COLL VENOUS BLD VENIPUNCTURE: CPT

## 2022-09-30 PROCEDURE — 85025 COMPLETE CBC W/AUTO DIFF WBC: CPT

## 2022-09-30 PROCEDURE — 80048 BASIC METABOLIC PNL TOTAL CA: CPT

## 2022-10-03 ENCOUNTER — HOSPITAL ENCOUNTER (OUTPATIENT)
Dept: INTERVENTIONAL RADIOLOGY/VASCULAR | Facility: HOSPITAL | Age: 67
Discharge: HOME | End: 2022-10-03
Attending: RADIOLOGY | Admitting: RADIOLOGY
Payer: MEDICARE

## 2022-10-03 VITALS
RESPIRATION RATE: 19 BRPM | HEART RATE: 58 BPM | OXYGEN SATURATION: 99 % | BODY MASS INDEX: 37.3 KG/M2 | DIASTOLIC BLOOD PRESSURE: 88 MMHG | SYSTOLIC BLOOD PRESSURE: 110 MMHG | HEIGHT: 60 IN | TEMPERATURE: 99 F | WEIGHT: 190 LBS

## 2022-10-03 DIAGNOSIS — D17.71 ANGIOMYOLIPOMA OF RIGHT KIDNEY: Primary | ICD-10-CM

## 2022-10-03 DIAGNOSIS — D17.71 RENAL ANGIOMYOLIPOMA: ICD-10-CM

## 2022-10-03 LAB — GLUCOSE BLDC GLUCOMTR-MCNC: 136 MG/DL (ref 70–99)

## 2022-10-03 PROCEDURE — 36247 INS CATH ABD/L-EXT ART 3RD: CPT

## 2022-10-03 PROCEDURE — 36415 COLL VENOUS BLD VENIPUNCTURE: CPT

## 2022-10-03 PROCEDURE — 37243 VASC EMBOLIZE/OCCLUDE ORGAN: CPT

## 2022-10-03 PROCEDURE — 99153 MOD SED SAME PHYS/QHP EA: CPT

## 2022-10-03 PROCEDURE — 82962 GLUCOSE BLOOD TEST: CPT

## 2022-10-03 PROCEDURE — 04L93DZ OCCLUSION OF RIGHT RENAL ARTERY WITH INTRALUMINAL DEVICE, PERCUTANEOUS APPROACH: ICD-10-PCS | Performed by: RADIOLOGY

## 2022-10-03 PROCEDURE — 99152 MOD SED SAME PHYS/QHP 5/>YRS: CPT

## 2022-10-03 RX ORDER — LIDOCAINE HYDROCHLORIDE 20 MG/ML
INJECTION, SOLUTION EPIDURAL; INFILTRATION; INTRACAUDAL; PERINEURAL
Status: COMPLETED
Start: 2022-10-03 | End: 2022-10-03

## 2022-10-03 RX ORDER — KETOROLAC TROMETHAMINE 30 MG/ML
INJECTION, SOLUTION INTRAMUSCULAR; INTRAVENOUS
Status: COMPLETED
Start: 2022-10-03 | End: 2022-10-03

## 2022-10-03 RX ORDER — NITROGLYCERIN 20 MG/100ML
INJECTION INTRAVENOUS
Status: COMPLETED
Start: 2022-10-03 | End: 2022-10-03

## 2022-10-03 RX ORDER — SODIUM CHLORIDE 9 MG/ML
INJECTION, SOLUTION INTRAVENOUS CONTINUOUS
Status: DISCONTINUED | OUTPATIENT
Start: 2022-10-03 | End: 2022-10-03

## 2022-10-03 RX ORDER — MIDAZOLAM HYDROCHLORIDE 1 MG/ML
INJECTION INTRAMUSCULAR; INTRAVENOUS
Status: COMPLETED
Start: 2022-10-03 | End: 2022-10-03

## 2022-10-03 RX ADMIN — SODIUM CHLORIDE: 9 INJECTION, SOLUTION INTRAVENOUS at 07:15:00

## 2022-10-03 NOTE — IVS NOTE
DISCHARGE NOTE     Pt is able to sit up and ambulate without difficulty. Pt voided and tolerated fluids and food. Procedural site remains dry and intact with good circulation, motion, and sensation. No signs and symptoms of bleeding/hematoma noted. IV access removed  Instruction provided, patient/family verbalizes understanding. Dr. Michael Santillan spoke with patient/family post procedure.      Pt discharge via wheelchair to [facility] [address]     Follow up Appointment: CT in 1 month     New Prescription: none

## 2022-10-03 NOTE — INTERVAL H&P NOTE
The above referenced H&P was reviewed by Pj Hernandez MD on 10/3/2022, the patient was examined and no significant changes have occurred in the patient's condition since the H&P was performed. Risks, benefits, alternative treatments and consequences of no treatment were discussed. We will proceed with procedure as planned.       Pj Hernandez MD  10/3/2022  7:22 AM

## 2022-10-18 ENCOUNTER — OFFICE VISIT (OUTPATIENT)
Dept: FAMILY MEDICINE CLINIC | Facility: CLINIC | Age: 67
End: 2022-10-18
Payer: MEDICARE

## 2022-10-18 VITALS
BODY MASS INDEX: 35.73 KG/M2 | DIASTOLIC BLOOD PRESSURE: 73 MMHG | SYSTOLIC BLOOD PRESSURE: 127 MMHG | WEIGHT: 182 LBS | HEIGHT: 60 IN | HEART RATE: 69 BPM

## 2022-10-18 DIAGNOSIS — I10 PRIMARY HYPERTENSION: ICD-10-CM

## 2022-10-18 DIAGNOSIS — N64.4 BREAST PAIN, LEFT: ICD-10-CM

## 2022-10-18 DIAGNOSIS — E11.9 TYPE 2 DIABETES MELLITUS WITHOUT COMPLICATION, WITHOUT LONG-TERM CURRENT USE OF INSULIN (HCC): Primary | ICD-10-CM

## 2022-10-18 DIAGNOSIS — E78.00 PURE HYPERCHOLESTEROLEMIA: ICD-10-CM

## 2022-10-18 DIAGNOSIS — N28.89 RENAL MASS, RIGHT: ICD-10-CM

## 2022-10-18 PROCEDURE — 1111F DSCHRG MED/CURRENT MED MERGE: CPT | Performed by: FAMILY MEDICINE

## 2022-10-18 PROCEDURE — 3078F DIAST BP <80 MM HG: CPT | Performed by: FAMILY MEDICINE

## 2022-10-18 PROCEDURE — 3008F BODY MASS INDEX DOCD: CPT | Performed by: FAMILY MEDICINE

## 2022-10-18 PROCEDURE — 1125F AMNT PAIN NOTED PAIN PRSNT: CPT | Performed by: FAMILY MEDICINE

## 2022-10-18 PROCEDURE — 99204 OFFICE O/P NEW MOD 45 MIN: CPT | Performed by: FAMILY MEDICINE

## 2022-10-18 PROCEDURE — 3074F SYST BP LT 130 MM HG: CPT | Performed by: FAMILY MEDICINE

## 2022-10-18 RX ORDER — EMPAGLIFLOZIN 25 MG/1
1 TABLET, FILM COATED ORAL DAILY
Qty: 90 TABLET | Refills: 1 | Status: SHIPPED | OUTPATIENT
Start: 2022-10-18

## 2022-10-18 RX ORDER — TIZANIDINE 4 MG/1
TABLET ORAL
COMMUNITY
Start: 2022-08-30 | End: 2022-10-18

## 2022-10-18 RX ORDER — ATORVASTATIN CALCIUM 10 MG/1
10 TABLET, FILM COATED ORAL EVERY 24 HOURS
Qty: 90 TABLET | Refills: 3 | Status: SHIPPED | OUTPATIENT
Start: 2022-10-18

## 2022-10-18 RX ORDER — METFORMIN HYDROCHLORIDE 750 MG/1
750 TABLET, EXTENDED RELEASE ORAL DAILY
Qty: 90 TABLET | Refills: 1 | Status: SHIPPED | OUTPATIENT
Start: 2022-10-18

## 2022-10-18 RX ORDER — LOSARTAN POTASSIUM AND HYDROCHLOROTHIAZIDE 12.5; 1 MG/1; MG/1
1 TABLET ORAL DAILY
Qty: 90 TABLET | Refills: 1 | Status: SHIPPED | OUTPATIENT
Start: 2022-10-18

## 2022-10-28 ENCOUNTER — HOSPITAL ENCOUNTER (OUTPATIENT)
Dept: ULTRASOUND IMAGING | Facility: HOSPITAL | Age: 67
Discharge: HOME | End: 2022-10-28
Attending: FAMILY MEDICINE
Payer: MEDICARE

## 2022-10-28 ENCOUNTER — HOSPITAL ENCOUNTER (OUTPATIENT)
Dept: MAMMOGRAPHY | Facility: HOSPITAL | Age: 67
Discharge: HOME | End: 2022-10-28
Attending: FAMILY MEDICINE
Payer: MEDICARE

## 2022-10-28 DIAGNOSIS — N64.4 BREAST PAIN, LEFT: ICD-10-CM

## 2022-10-28 PROCEDURE — 77066 DX MAMMO INCL CAD BI: CPT | Performed by: FAMILY MEDICINE

## 2022-10-28 PROCEDURE — 77062 BREAST TOMOSYNTHESIS BI: CPT | Performed by: FAMILY MEDICINE

## 2022-10-28 PROCEDURE — 76642 ULTRASOUND BREAST LIMITED: CPT | Performed by: FAMILY MEDICINE

## 2022-10-30 DIAGNOSIS — E11.9 TYPE 2 DIABETES MELLITUS WITHOUT COMPLICATION, WITHOUT LONG-TERM CURRENT USE OF INSULIN (HCC): Primary | ICD-10-CM

## 2022-11-03 ENCOUNTER — HOSPITAL ENCOUNTER (OUTPATIENT)
Dept: CT IMAGING | Facility: HOSPITAL | Age: 67
Discharge: HOME | End: 2022-11-03
Attending: CLINICAL NURSE SPECIALIST
Payer: MEDICARE

## 2022-11-03 DIAGNOSIS — D17.71 ANGIOMYOLIPOMA OF RIGHT KIDNEY: ICD-10-CM

## 2022-11-03 DIAGNOSIS — D17.71 RENAL ANGIOMYOLIPOMA: ICD-10-CM

## 2022-11-03 LAB
CREAT BLD-MCNC: 0.7 MG/DL
GFR SERPLBLD BASED ON 1.73 SQ M-ARVRAT: 94 ML/MIN/1.73M2 (ref 60–?)

## 2022-11-03 PROCEDURE — 74174 CTA ABD&PLVS W/CONTRAST: CPT | Performed by: CLINICAL NURSE SPECIALIST

## 2022-11-03 PROCEDURE — 82565 ASSAY OF CREATININE: CPT

## 2022-11-16 ENCOUNTER — OFFICE VISIT (OUTPATIENT)
Dept: FAMILY MEDICINE CLINIC | Facility: CLINIC | Age: 67
End: 2022-11-16
Payer: MEDICARE

## 2022-11-16 VITALS
BODY MASS INDEX: 34.63 KG/M2 | SYSTOLIC BLOOD PRESSURE: 121 MMHG | DIASTOLIC BLOOD PRESSURE: 80 MMHG | HEIGHT: 60 IN | WEIGHT: 176.38 LBS | HEART RATE: 73 BPM

## 2022-11-16 DIAGNOSIS — R19.7 DIARRHEA OF PRESUMED INFECTIOUS ORIGIN: ICD-10-CM

## 2022-11-16 DIAGNOSIS — D25.9 UTERINE LEIOMYOMA, UNSPECIFIED LOCATION: ICD-10-CM

## 2022-11-16 DIAGNOSIS — D17.71 RENAL ANGIOLIPOMA: ICD-10-CM

## 2022-11-16 DIAGNOSIS — M80.00XA AGE-RELATED OSTEOPOROSIS WITH CURRENT PATHOLOGICAL FRACTURE, INITIAL ENCOUNTER: Primary | ICD-10-CM

## 2022-11-16 DIAGNOSIS — M80.80XP: ICD-10-CM

## 2022-11-16 PROCEDURE — 99214 OFFICE O/P EST MOD 30 MIN: CPT | Performed by: FAMILY MEDICINE

## 2022-11-16 PROCEDURE — 1126F AMNT PAIN NOTED NONE PRSNT: CPT | Performed by: FAMILY MEDICINE

## 2022-11-16 PROCEDURE — 3074F SYST BP LT 130 MM HG: CPT | Performed by: FAMILY MEDICINE

## 2022-11-16 PROCEDURE — 3008F BODY MASS INDEX DOCD: CPT | Performed by: FAMILY MEDICINE

## 2022-11-16 PROCEDURE — 3079F DIAST BP 80-89 MM HG: CPT | Performed by: FAMILY MEDICINE

## 2022-11-16 RX ORDER — ALENDRONATE SODIUM 70 MG/1
70 TABLET ORAL
Qty: 12 TABLET | Refills: 3 | Status: SHIPPED | OUTPATIENT
Start: 2022-11-16

## 2022-11-28 ENCOUNTER — LAB ENCOUNTER (OUTPATIENT)
Dept: LAB | Facility: HOSPITAL | Age: 67
End: 2022-11-28
Attending: FAMILY MEDICINE
Payer: MEDICARE

## 2022-11-28 DIAGNOSIS — M80.00XA AGE-RELATED OSTEOPOROSIS WITH CURRENT PATHOLOGICAL FRACTURE, INITIAL ENCOUNTER: ICD-10-CM

## 2022-11-28 LAB — VIT D+METAB SERPL-MCNC: 9.4 NG/ML (ref 30–100)

## 2022-11-28 PROCEDURE — 82306 VITAMIN D 25 HYDROXY: CPT

## 2022-11-28 PROCEDURE — 36415 COLL VENOUS BLD VENIPUNCTURE: CPT

## 2022-11-28 RX ORDER — ERGOCALCIFEROL 1.25 MG/1
50000 CAPSULE ORAL WEEKLY
Qty: 12 CAPSULE | Refills: 4 | Status: SHIPPED | OUTPATIENT
Start: 2022-11-28 | End: 2022-12-28

## 2022-11-29 ENCOUNTER — NURSE TRIAGE (OUTPATIENT)
Dept: FAMILY MEDICINE CLINIC | Facility: CLINIC | Age: 67
End: 2022-11-29

## 2022-11-29 ENCOUNTER — HOSPITAL ENCOUNTER (OUTPATIENT)
Dept: BONE DENSITY | Age: 67
Discharge: HOME | End: 2022-11-29
Attending: FAMILY MEDICINE

## 2022-11-29 DIAGNOSIS — M80.00XA AGE-RELATED OSTEOPOROSIS WITH CURRENT PATHOLOGICAL FRACTURE, INITIAL ENCOUNTER: ICD-10-CM

## 2022-11-29 PROCEDURE — 77080 DXA BONE DENSITY AXIAL: CPT | Performed by: FAMILY MEDICINE

## 2022-11-29 NOTE — TELEPHONE ENCOUNTER
Don't take any Alendronate for 6 weeks, then restart Q week, PAY ATTENTION TO WHAT SHE IS DOING. Have some one help her at home with her medications administration.

## 2022-11-30 ENCOUNTER — APPOINTMENT (OUTPATIENT)
Dept: LAB | Facility: HOSPITAL | Age: 67
End: 2022-11-30
Attending: FAMILY MEDICINE
Payer: MEDICARE

## 2022-11-30 PROCEDURE — 87046 STOOL CULTR AEROBIC BACT EA: CPT

## 2022-11-30 PROCEDURE — 87045 FECES CULTURE AEROBIC BACT: CPT

## 2022-11-30 PROCEDURE — 87427 SHIGA-LIKE TOXIN AG IA: CPT

## 2022-11-30 NOTE — TELEPHONE ENCOUNTER
With  Whitney Dillard #[id number], Advised patient of Dr. Bal Cohen note. Patient verbalized understanding.

## 2022-12-01 ENCOUNTER — LAB ENCOUNTER (OUTPATIENT)
Dept: LAB | Facility: HOSPITAL | Age: 67
End: 2022-12-01
Attending: FAMILY MEDICINE
Payer: MEDICARE

## 2022-12-01 DIAGNOSIS — R19.7 DIARRHEA OF PRESUMED INFECTIOUS ORIGIN: ICD-10-CM

## 2022-12-13 ENCOUNTER — PATIENT MESSAGE (OUTPATIENT)
Dept: FAMILY MEDICINE CLINIC | Facility: CLINIC | Age: 67
End: 2022-12-13

## 2022-12-14 ENCOUNTER — OFFICE VISIT (OUTPATIENT)
Dept: FAMILY MEDICINE CLINIC | Facility: CLINIC | Age: 67
End: 2022-12-14
Payer: MEDICARE

## 2022-12-14 VITALS
WEIGHT: 178.81 LBS | BODY MASS INDEX: 35.11 KG/M2 | DIASTOLIC BLOOD PRESSURE: 78 MMHG | SYSTOLIC BLOOD PRESSURE: 135 MMHG | HEART RATE: 72 BPM | HEIGHT: 60 IN

## 2022-12-14 DIAGNOSIS — E11.9 TYPE 2 DIABETES MELLITUS WITHOUT COMPLICATION, WITHOUT LONG-TERM CURRENT USE OF INSULIN (HCC): ICD-10-CM

## 2022-12-14 DIAGNOSIS — F17.210 CIGARETTE NICOTINE DEPENDENCE WITHOUT COMPLICATION: ICD-10-CM

## 2022-12-14 DIAGNOSIS — I10 PRIMARY HYPERTENSION: ICD-10-CM

## 2022-12-14 DIAGNOSIS — E55.9 VITAMIN D DEFICIENCY: ICD-10-CM

## 2022-12-14 DIAGNOSIS — M80.00XA AGE-RELATED OSTEOPOROSIS WITH CURRENT PATHOLOGICAL FRACTURE, INITIAL ENCOUNTER: Primary | ICD-10-CM

## 2022-12-14 PROCEDURE — 99214 OFFICE O/P EST MOD 30 MIN: CPT | Performed by: FAMILY MEDICINE

## 2022-12-14 PROCEDURE — 1126F AMNT PAIN NOTED NONE PRSNT: CPT | Performed by: FAMILY MEDICINE

## 2022-12-14 PROCEDURE — 3008F BODY MASS INDEX DOCD: CPT | Performed by: FAMILY MEDICINE

## 2022-12-14 PROCEDURE — 3078F DIAST BP <80 MM HG: CPT | Performed by: FAMILY MEDICINE

## 2022-12-14 PROCEDURE — 3075F SYST BP GE 130 - 139MM HG: CPT | Performed by: FAMILY MEDICINE

## 2022-12-14 RX ORDER — NICOTINE 21 MG/24HR
1 PATCH, TRANSDERMAL 24 HOURS TRANSDERMAL EVERY 24 HOURS
Qty: 30 PATCH | Refills: 3 | Status: SHIPPED | OUTPATIENT
Start: 2022-12-14

## 2023-02-24 ENCOUNTER — NURSE TRIAGE (OUTPATIENT)
Dept: FAMILY MEDICINE CLINIC | Facility: CLINIC | Age: 68
End: 2023-02-24

## 2023-02-25 ENCOUNTER — OFFICE VISIT (OUTPATIENT)
Dept: FAMILY MEDICINE CLINIC | Facility: CLINIC | Age: 68
End: 2023-02-25

## 2023-02-25 ENCOUNTER — LAB ENCOUNTER (OUTPATIENT)
Dept: LAB | Age: 68
End: 2023-02-25
Attending: FAMILY MEDICINE
Payer: MEDICARE

## 2023-02-25 VITALS
HEART RATE: 71 BPM | HEIGHT: 60 IN | DIASTOLIC BLOOD PRESSURE: 79 MMHG | BODY MASS INDEX: 35.14 KG/M2 | WEIGHT: 179 LBS | SYSTOLIC BLOOD PRESSURE: 129 MMHG

## 2023-02-25 DIAGNOSIS — E11.9 DIABETES MELLITUS (HCC): Primary | ICD-10-CM

## 2023-02-25 DIAGNOSIS — E11.9 TYPE 2 DIABETES MELLITUS WITHOUT COMPLICATION, WITHOUT LONG-TERM CURRENT USE OF INSULIN (HCC): ICD-10-CM

## 2023-02-25 DIAGNOSIS — M80.00XA AGE-RELATED OSTEOPOROSIS WITH CURRENT PATHOLOGICAL FRACTURE, INITIAL ENCOUNTER: ICD-10-CM

## 2023-02-25 DIAGNOSIS — E11.9 TYPE 2 DIABETES MELLITUS WITHOUT COMPLICATION, WITHOUT LONG-TERM CURRENT USE OF INSULIN (HCC): Primary | ICD-10-CM

## 2023-02-25 PROBLEM — J41.0 SMOKERS' COUGH (HCC): Chronic | Status: ACTIVE | Noted: 2023-02-25

## 2023-02-25 PROBLEM — I73.9 PVD (PERIPHERAL VASCULAR DISEASE) WITH CLAUDICATION (HCC): Status: ACTIVE | Noted: 2023-02-25

## 2023-02-25 PROBLEM — I73.9 PVD (PERIPHERAL VASCULAR DISEASE) WITH CLAUDICATION: Status: ACTIVE | Noted: 2023-02-25

## 2023-02-25 LAB
ALBUMIN SERPL-MCNC: 3.6 G/DL (ref 3.4–5)
ALBUMIN/GLOB SERPL: 0.9 {RATIO} (ref 1–2)
ALP LIVER SERPL-CCNC: 97 U/L
ALT SERPL-CCNC: 19 U/L
ANION GAP SERPL CALC-SCNC: 6 MMOL/L (ref 0–18)
AST SERPL-CCNC: 15 U/L (ref 15–37)
BILIRUB SERPL-MCNC: 0.5 MG/DL (ref 0.1–2)
BUN BLD-MCNC: 14 MG/DL (ref 7–18)
BUN/CREAT SERPL: 20 (ref 10–20)
CALCIUM BLD-MCNC: 9.4 MG/DL (ref 8.5–10.1)
CARTRIDGE EXPIRATION DATE: ABNORMAL DATE
CARTRIDGE LOT#: ABNORMAL NUMERIC
CHLORIDE SERPL-SCNC: 108 MMOL/L (ref 98–112)
CHOLEST SERPL-MCNC: 152 MG/DL (ref ?–200)
CO2 SERPL-SCNC: 28 MMOL/L (ref 21–32)
CREAT BLD-MCNC: 0.7 MG/DL
CREAT UR-SCNC: 74.1 MG/DL
EST. AVERAGE GLUCOSE BLD GHB EST-MCNC: 146 MG/DL (ref 68–126)
FASTING PATIENT LIPID ANSWER: YES
FASTING STATUS PATIENT QL REPORTED: YES
GFR SERPLBLD BASED ON 1.73 SQ M-ARVRAT: 94 ML/MIN/1.73M2 (ref 60–?)
GLOBULIN PLAS-MCNC: 4.1 G/DL (ref 2.8–4.4)
GLUCOSE BLD-MCNC: 131 MG/DL (ref 70–99)
HBA1C MFR BLD: 6.7 % (ref ?–5.7)
HDLC SERPL-MCNC: 63 MG/DL (ref 40–59)
HEMOGLOBIN A1C: 6.7 % (ref 4.3–5.6)
LDLC SERPL CALC-MCNC: 71 MG/DL (ref ?–100)
MICROALBUMIN UR-MCNC: 0.58 MG/DL
MICROALBUMIN/CREAT 24H UR-RTO: 7.8 UG/MG (ref ?–30)
NONHDLC SERPL-MCNC: 89 MG/DL (ref ?–130)
OSMOLALITY SERPL CALC.SUM OF ELEC: 296 MOSM/KG (ref 275–295)
POTASSIUM SERPL-SCNC: 3.7 MMOL/L (ref 3.5–5.1)
PROT SERPL-MCNC: 7.7 G/DL (ref 6.4–8.2)
SODIUM SERPL-SCNC: 142 MMOL/L (ref 136–145)
TRIGL SERPL-MCNC: 99 MG/DL (ref 30–149)
VLDLC SERPL CALC-MCNC: 15 MG/DL (ref 0–30)

## 2023-02-25 PROCEDURE — 3008F BODY MASS INDEX DOCD: CPT | Performed by: FAMILY MEDICINE

## 2023-02-25 PROCEDURE — 36415 COLL VENOUS BLD VENIPUNCTURE: CPT

## 2023-02-25 PROCEDURE — 3078F DIAST BP <80 MM HG: CPT | Performed by: FAMILY MEDICINE

## 2023-02-25 PROCEDURE — 82570 ASSAY OF URINE CREATININE: CPT

## 2023-02-25 PROCEDURE — 83036 HEMOGLOBIN GLYCOSYLATED A1C: CPT | Performed by: FAMILY MEDICINE

## 2023-02-25 PROCEDURE — 82043 UR ALBUMIN QUANTITATIVE: CPT

## 2023-02-25 PROCEDURE — 80061 LIPID PANEL: CPT

## 2023-02-25 PROCEDURE — 3074F SYST BP LT 130 MM HG: CPT | Performed by: FAMILY MEDICINE

## 2023-02-25 PROCEDURE — 3061F NEG MICROALBUMINURIA REV: CPT | Performed by: FAMILY MEDICINE

## 2023-02-25 PROCEDURE — 83036 HEMOGLOBIN GLYCOSYLATED A1C: CPT

## 2023-02-25 PROCEDURE — 99213 OFFICE O/P EST LOW 20 MIN: CPT | Performed by: FAMILY MEDICINE

## 2023-02-25 PROCEDURE — 80053 COMPREHEN METABOLIC PANEL: CPT

## 2023-02-25 PROCEDURE — 1126F AMNT PAIN NOTED NONE PRSNT: CPT | Performed by: FAMILY MEDICINE

## 2023-02-25 RX ORDER — METFORMIN HYDROCHLORIDE 750 MG/1
750 TABLET, EXTENDED RELEASE ORAL DAILY
Qty: 90 TABLET | Refills: 1 | Status: SHIPPED | OUTPATIENT
Start: 2023-02-25

## 2023-02-25 RX ORDER — EMPAGLIFLOZIN 25 MG/1
1 TABLET, FILM COATED ORAL DAILY
Qty: 90 TABLET | Refills: 1 | Status: SHIPPED | OUTPATIENT
Start: 2023-02-25

## 2023-02-25 RX ORDER — BLOOD SUGAR DIAGNOSTIC
STRIP MISCELLANEOUS DAILY
COMMUNITY
Start: 2022-12-26

## 2023-02-25 RX ORDER — LANCETS 33 GAUGE
1 EACH MISCELLANEOUS DAILY
COMMUNITY
Start: 2022-12-26

## 2023-02-25 RX ORDER — LOSARTAN POTASSIUM AND HYDROCHLOROTHIAZIDE 12.5; 1 MG/1; MG/1
1 TABLET ORAL DAILY
Qty: 90 TABLET | Refills: 2 | Status: SHIPPED | OUTPATIENT
Start: 2023-02-25

## 2023-02-25 RX ORDER — ATORVASTATIN CALCIUM 10 MG/1
10 TABLET, FILM COATED ORAL EVERY 24 HOURS
Qty: 90 TABLET | Refills: 3 | Status: SHIPPED | OUTPATIENT
Start: 2023-02-25

## 2023-02-25 RX ORDER — DULOXETIN HYDROCHLORIDE 30 MG/1
CAPSULE, DELAYED RELEASE ORAL
COMMUNITY
Start: 2023-01-31 | End: 2023-02-25

## 2023-02-25 RX ORDER — ALENDRONATE SODIUM 70 MG/1
70 TABLET ORAL
Qty: 12 TABLET | Refills: 3 | Status: SHIPPED | OUTPATIENT
Start: 2023-02-25

## 2023-03-13 ENCOUNTER — OFFICE VISIT (OUTPATIENT)
Dept: SURGERY | Facility: CLINIC | Age: 68
End: 2023-03-13

## 2023-03-13 DIAGNOSIS — D17.71 ANGIOMYOLIPOMA OF RIGHT KIDNEY: ICD-10-CM

## 2023-03-13 DIAGNOSIS — R31.29 MICROHEMATURIA: Primary | ICD-10-CM

## 2023-03-13 DIAGNOSIS — G89.29 CHRONIC MIDLINE LOW BACK PAIN WITHOUT SCIATICA: ICD-10-CM

## 2023-03-13 DIAGNOSIS — M54.50 CHRONIC MIDLINE LOW BACK PAIN WITHOUT SCIATICA: ICD-10-CM

## 2023-03-13 LAB
BILIRUBIN: NEGATIVE
GLUCOSE (URINE DIPSTICK): >=1000 MG/DL
KETONES (URINE DIPSTICK): NEGATIVE MG/DL
LEUKOCYTES: NEGATIVE
MULTISTIX LOT#: ABNORMAL NUMERIC
NITRITE, URINE: NEGATIVE
PH, URINE: 5.5 (ref 4.5–8)
PROTEIN (URINE DIPSTICK): NEGATIVE MG/DL
SPECIFIC GRAVITY: 1 (ref 1–1.03)
UROBILINOGEN,SEMI-QN: 0.2 MG/DL (ref 0–1.9)

## 2023-03-13 PROCEDURE — 81002 URINALYSIS NONAUTO W/O SCOPE: CPT | Performed by: NURSE PRACTITIONER

## 2023-03-13 PROCEDURE — 99214 OFFICE O/P EST MOD 30 MIN: CPT | Performed by: NURSE PRACTITIONER

## 2023-03-30 ENCOUNTER — TELEPHONE (OUTPATIENT)
Dept: SURGERY | Facility: CLINIC | Age: 68
End: 2023-03-30

## 2023-03-30 NOTE — TELEPHONE ENCOUNTER
Fax received [facility], approval for kidney ultrasound CPT 78031 pt has this scheduled 4/19/23 will place in scanning

## 2023-04-06 ENCOUNTER — HOSPITAL ENCOUNTER (OUTPATIENT)
Dept: ENDOCRINOLOGY | Age: 68
Discharge: HOME | End: 2023-04-06
Payer: MEDICARE

## 2023-04-06 DIAGNOSIS — E11.65 TYPE 2 DIABETES MELLITUS WITH HYPERGLYCEMIA, WITHOUT LONG-TERM CURRENT USE OF INSULIN (HCC): Primary | ICD-10-CM

## 2023-04-10 PROCEDURE — 99285 EMERGENCY DEPT VISIT HI MDM: CPT

## 2023-04-10 PROCEDURE — 96374 THER/PROPH/DIAG INJ IV PUSH: CPT

## 2023-04-10 PROCEDURE — 96361 HYDRATE IV INFUSION ADD-ON: CPT

## 2023-04-10 PROCEDURE — 93010 ELECTROCARDIOGRAM REPORT: CPT

## 2023-04-11 ENCOUNTER — HOSPITAL ENCOUNTER (EMERGENCY)
Facility: HOSPITAL | Age: 68
Discharge: HOME | End: 2023-04-11
Attending: EMERGENCY MEDICINE
Payer: MEDICARE

## 2023-04-11 ENCOUNTER — APPOINTMENT (OUTPATIENT)
Dept: MRI IMAGING | Facility: HOSPITAL | Age: 68
End: 2023-04-11
Attending: EMERGENCY MEDICINE
Payer: MEDICARE

## 2023-04-11 ENCOUNTER — APPOINTMENT (OUTPATIENT)
Dept: GENERAL RADIOLOGY | Facility: HOSPITAL | Age: 68
End: 2023-04-11
Payer: MEDICARE

## 2023-04-11 VITALS
RESPIRATION RATE: 17 BRPM | SYSTOLIC BLOOD PRESSURE: 127 MMHG | HEART RATE: 61 BPM | DIASTOLIC BLOOD PRESSURE: 54 MMHG | OXYGEN SATURATION: 98 % | HEIGHT: 60 IN | WEIGHT: 174 LBS | TEMPERATURE: 98 F | BODY MASS INDEX: 34.16 KG/M2

## 2023-04-11 DIAGNOSIS — L30.9 DERMATITIS: ICD-10-CM

## 2023-04-11 DIAGNOSIS — R20.2 PARESTHESIAS: Primary | ICD-10-CM

## 2023-04-11 LAB
ALBUMIN SERPL-MCNC: 3.4 G/DL (ref 3.4–5)
ALBUMIN/GLOB SERPL: 0.8 {RATIO} (ref 1–2)
ALP LIVER SERPL-CCNC: 103 U/L
ALT SERPL-CCNC: 21 U/L
ANION GAP SERPL CALC-SCNC: 7 MMOL/L (ref 0–18)
AST SERPL-CCNC: 15 U/L (ref 15–37)
ATRIAL RATE: 63 BPM
BASOPHILS # BLD AUTO: 0.05 X10(3) UL (ref 0–0.2)
BASOPHILS NFR BLD AUTO: 0.4 %
BILIRUB SERPL-MCNC: 0.3 MG/DL (ref 0.1–2)
BUN BLD-MCNC: 20 MG/DL (ref 7–18)
BUN/CREAT SERPL: 23.3 (ref 10–20)
CALCIUM BLD-MCNC: 9.5 MG/DL (ref 8.5–10.1)
CHLORIDE SERPL-SCNC: 106 MMOL/L (ref 98–112)
CO2 SERPL-SCNC: 27 MMOL/L (ref 21–32)
CREAT BLD-MCNC: 0.86 MG/DL
DEPRECATED RDW RBC AUTO: 44 FL (ref 35.1–46.3)
EOSINOPHIL # BLD AUTO: 0.23 X10(3) UL (ref 0–0.7)
EOSINOPHIL NFR BLD AUTO: 2 %
ERYTHROCYTE [DISTWIDTH] IN BLOOD BY AUTOMATED COUNT: 13.2 % (ref 11–15)
GFR SERPLBLD BASED ON 1.73 SQ M-ARVRAT: 73 ML/MIN/1.73M2 (ref 60–?)
GLOBULIN PLAS-MCNC: 4.2 G/DL (ref 2.8–4.4)
GLUCOSE BLD-MCNC: 115 MG/DL (ref 70–99)
HCT VFR BLD AUTO: 40 %
HGB BLD-MCNC: 12.6 G/DL
IMM GRANULOCYTES # BLD AUTO: 0.03 X10(3) UL (ref 0–1)
IMM GRANULOCYTES NFR BLD: 0.3 %
LYMPHOCYTES # BLD AUTO: 3.65 X10(3) UL (ref 1–4)
LYMPHOCYTES NFR BLD AUTO: 31.2 %
MCH RBC QN AUTO: 28.4 PG (ref 26–34)
MCHC RBC AUTO-ENTMCNC: 31.5 G/DL (ref 31–37)
MCV RBC AUTO: 90.3 FL
MONOCYTES # BLD AUTO: 0.97 X10(3) UL (ref 0.1–1)
MONOCYTES NFR BLD AUTO: 8.3 %
NEUTROPHILS # BLD AUTO: 6.77 X10 (3) UL (ref 1.5–7.7)
NEUTROPHILS # BLD AUTO: 6.77 X10(3) UL (ref 1.5–7.7)
NEUTROPHILS NFR BLD AUTO: 57.8 %
OSMOLALITY SERPL CALC.SUM OF ELEC: 294 MOSM/KG (ref 275–295)
P AXIS: 48 DEGREES
P-R INTERVAL: 144 MS
PLATELET # BLD AUTO: 250 10(3)UL (ref 150–450)
POTASSIUM SERPL-SCNC: 3.4 MMOL/L (ref 3.5–5.1)
PROT SERPL-MCNC: 7.6 G/DL (ref 6.4–8.2)
Q-T INTERVAL: 392 MS
QRS DURATION: 80 MS
QTC CALCULATION (BEZET): 401 MS
R AXIS: 28 DEGREES
RBC # BLD AUTO: 4.43 X10(6)UL
SODIUM SERPL-SCNC: 140 MMOL/L (ref 136–145)
T AXIS: 22 DEGREES
TROPONIN I HIGH SENSITIVITY: 9 NG/L
VENTRICULAR RATE: 63 BPM
WBC # BLD AUTO: 11.7 X10(3) UL (ref 4–11)

## 2023-04-11 PROCEDURE — 93005 ELECTROCARDIOGRAM TRACING: CPT

## 2023-04-11 PROCEDURE — 84484 ASSAY OF TROPONIN QUANT: CPT | Performed by: EMERGENCY MEDICINE

## 2023-04-11 PROCEDURE — 85025 COMPLETE CBC W/AUTO DIFF WBC: CPT | Performed by: EMERGENCY MEDICINE

## 2023-04-11 PROCEDURE — 71045 X-RAY EXAM CHEST 1 VIEW: CPT | Performed by: EMERGENCY MEDICINE

## 2023-04-11 PROCEDURE — 70551 MRI BRAIN STEM W/O DYE: CPT | Performed by: EMERGENCY MEDICINE

## 2023-04-11 PROCEDURE — 80053 COMPREHEN METABOLIC PANEL: CPT | Performed by: EMERGENCY MEDICINE

## 2023-04-11 RX ORDER — ORPHENADRINE CITRATE 100 MG/1
100 TABLET, EXTENDED RELEASE ORAL 2 TIMES DAILY PRN
Qty: 20 TABLET | Refills: 0 | Status: SHIPPED | OUTPATIENT
Start: 2023-04-11 | End: 2023-04-18

## 2023-04-11 RX ORDER — ORPHENADRINE CITRATE 30 MG/ML
30 INJECTION INTRAMUSCULAR; INTRAVENOUS ONCE
Status: COMPLETED | OUTPATIENT
Start: 2023-04-11 | End: 2023-04-11

## 2023-04-11 RX ORDER — DIAPER,BRIEF,INFANT-TODD,DISP
1 EACH MISCELLANEOUS 2 TIMES DAILY
Qty: 1.5 G | Refills: 0 | Status: SHIPPED | OUTPATIENT
Start: 2023-04-11 | End: 2023-04-16

## 2023-04-11 NOTE — ED INITIAL ASSESSMENT (HPI)
Pt reports feeling dizzy before she went to sleep, woke up with the left side neck pain shoulder pain and upper back pain \"ache, burning\" denies changes in vision, cp/sob

## 2023-04-11 NOTE — ED QUICK NOTES
Patient up and ambulatory to the restroom and back to room with this writer as standby. Pt denying any lightheadedness or dizziness.

## 2023-04-11 NOTE — ED QUICK NOTES
Patient out of department to MRI for additional testing. Medication patch removed for MRI screening.

## 2023-04-19 ENCOUNTER — HOSPITAL ENCOUNTER (OUTPATIENT)
Dept: ULTRASOUND IMAGING | Facility: HOSPITAL | Age: 68
Discharge: HOME | End: 2023-04-19
Attending: NURSE PRACTITIONER
Payer: MEDICARE

## 2023-04-19 DIAGNOSIS — R31.29 MICROHEMATURIA: ICD-10-CM

## 2023-04-19 DIAGNOSIS — M54.50 CHRONIC MIDLINE LOW BACK PAIN WITHOUT SCIATICA: ICD-10-CM

## 2023-04-19 DIAGNOSIS — G89.29 CHRONIC MIDLINE LOW BACK PAIN WITHOUT SCIATICA: ICD-10-CM

## 2023-04-19 PROCEDURE — 76775 US EXAM ABDO BACK WALL LIM: CPT | Performed by: NURSE PRACTITIONER

## 2023-06-08 ENCOUNTER — OFFICE VISIT (OUTPATIENT)
Dept: SURGERY | Facility: CLINIC | Age: 68
End: 2023-06-08

## 2023-06-08 VITALS — DIASTOLIC BLOOD PRESSURE: 66 MMHG | HEART RATE: 79 BPM | SYSTOLIC BLOOD PRESSURE: 121 MMHG

## 2023-06-08 DIAGNOSIS — R30.0 DYSURIA: Primary | ICD-10-CM

## 2023-06-08 LAB
BILIRUB UR QL: NEGATIVE
CLARITY UR: CLEAR
GLUCOSE UR-MCNC: >1000 MG/DL
HGB UR QL STRIP.AUTO: NEGATIVE
KETONES UR-MCNC: NEGATIVE MG/DL
LEUKOCYTE ESTERASE UR QL STRIP.AUTO: NEGATIVE
NITRITE UR QL STRIP.AUTO: NEGATIVE
PH UR: 6.5 [PH] (ref 5–8)
PROT UR-MCNC: NEGATIVE MG/DL
SP GR UR STRIP: 1.02 (ref 1–1.03)
UROBILINOGEN UR STRIP-ACNC: NORMAL

## 2023-06-08 PROCEDURE — 1160F RVW MEDS BY RX/DR IN RCRD: CPT

## 2023-06-08 PROCEDURE — 99213 OFFICE O/P EST LOW 20 MIN: CPT

## 2023-06-08 PROCEDURE — 3078F DIAST BP <80 MM HG: CPT

## 2023-06-08 PROCEDURE — 1159F MED LIST DOCD IN RCRD: CPT

## 2023-06-08 PROCEDURE — 3074F SYST BP LT 130 MM HG: CPT

## 2023-06-08 RX ORDER — SULFAMETHOXAZOLE AND TRIMETHOPRIM 800; 160 MG/1; MG/1
1 TABLET ORAL 2 TIMES DAILY
Qty: 14 TABLET | Refills: 0 | Status: SHIPPED | OUTPATIENT
Start: 2023-06-08 | End: 2023-06-15

## 2023-06-08 NOTE — PROGRESS NOTES
[facility] Urology  Follow-Up Visit    HPI: Monda Cushing is a 69year old female presents for a follow up visit. Patient was last seen on 3/13/2023 by KIMBERLYN Morales.  [id number]    INTERVAL HISTORY:     Patient presents today with complaints of dysuria and itchiness for 2 weeks. She also complaints of small bumps on vaginal area. Complaints of itchiness with urination. Denies fevers. Complaints of chills. Patient also complaints of \"glass particles\" in the urine in the morning. Reviewed past medical, surgical, family, and social history. Reviewed med list and allergies. REVIEW OF SYSTEMS:  Pertinent positives and negatives per HPI. A 10-point ROS was performed and is otherwise negative. EXAM:  /66 (BP Location: Left arm, Patient Position: Sitting, Cuff Size: large)   Pulse 79   Physical Exam  Constitutional:       Appearance: Normal appearance. Pulmonary:      Effort: Pulmonary effort is normal.   Abdominal:      Palpations: Abdomen is soft. Genitourinary:     Comments: No skin abnormality noted on the vaginal area. Musculoskeletal:         General: Normal range of motion. Cervical back: Normal range of motion. Skin:     General: Skin is warm. Neurological:      General: No focal deficit present. Mental Status: She is alert. Psychiatric:         Mood and Affect: Mood normal.         Behavior: Behavior normal.           IMAGING:  No results found. IMPRESSION:  69year old female presents with complaints of dysuria and vaginal itchiness when urinating. Discussed relevant anatomy and physiology with patient. Will obtain urinalysis today. Start patient on Bactrim DS BID for 7 days until urine culture sensitivities are back.        PLAN:  - Bactrim DS BID for 7 days   - Increase water intake           MARIA M Pizarro  6/8/2023

## 2023-06-09 ENCOUNTER — TELEPHONE (OUTPATIENT)
Dept: SURGERY | Facility: CLINIC | Age: 68
End: 2023-06-09

## 2023-06-09 DIAGNOSIS — N89.8 VAGINA ITCHING: Primary | ICD-10-CM

## 2023-06-09 RX ORDER — ESTRADIOL 0.1 MG/G
CREAM VAGINAL
Qty: 42.5 G | Refills: 1 | Status: SHIPPED | OUTPATIENT
Start: 2023-06-09

## 2023-06-09 NOTE — TELEPHONE ENCOUNTER
-S/p with pt, I verified identity using name and . I read message to pt as written by MARIA M ATKINS. Pt verbalized understanding of message and agreed to schedule for f/u. Danieljocy Ellisiter Pt has seen [facility] previously, offered appt with same provider, pt agreed. Scheduled appt for f/u with microscopic hematuria on 23 at 11:45 am. Pt verbalized understanding of apt details and agreed to appt. Encounter complete.         ----- Message from MARIA M Hou sent at 2023 11:33 AM CDT -----  Results discussed with patient over the phone using [employer] . Estrace cream sent to patient's pharmacy. Verified with patient she does not have history of estrogen dependent cancer. Staff can we please schedule patient to see a provider in the clinic for microscopic hematuria. Thanks.

## 2023-06-12 ENCOUNTER — OFFICE VISIT (OUTPATIENT)
Dept: SURGERY | Facility: CLINIC | Age: 68
End: 2023-06-12

## 2023-06-12 DIAGNOSIS — R31.29 MICROHEMATURIA: Primary | ICD-10-CM

## 2023-06-12 DIAGNOSIS — N95.2 ATROPHIC VAGINITIS: ICD-10-CM

## 2023-06-12 DIAGNOSIS — D17.71 ANGIOMYOLIPOMA OF RIGHT KIDNEY: ICD-10-CM

## 2023-06-12 PROCEDURE — 99213 OFFICE O/P EST LOW 20 MIN: CPT | Performed by: NURSE PRACTITIONER

## 2023-06-12 PROCEDURE — 1160F RVW MEDS BY RX/DR IN RCRD: CPT | Performed by: NURSE PRACTITIONER

## 2023-06-12 PROCEDURE — 1159F MED LIST DOCD IN RCRD: CPT | Performed by: NURSE PRACTITIONER

## 2023-06-12 NOTE — PATIENT INSTRUCTIONS
Apply vaginal cream 1 g (pea sized) every day for 1 week, then decrease to every other day. This can take up to 4 months to start working. Follow up in 4 months. Let me know if you have any questions.

## 2023-10-06 ENCOUNTER — APPOINTMENT (OUTPATIENT)
Dept: GENERAL RADIOLOGY | Facility: HOSPITAL | Age: 68
End: 2023-10-06
Attending: STUDENT IN AN ORGANIZED HEALTH CARE EDUCATION/TRAINING PROGRAM
Payer: MEDICARE

## 2023-10-06 ENCOUNTER — APPOINTMENT (OUTPATIENT)
Dept: MRI IMAGING | Facility: HOSPITAL | Age: 68
End: 2023-10-06
Attending: STUDENT IN AN ORGANIZED HEALTH CARE EDUCATION/TRAINING PROGRAM
Payer: MEDICARE

## 2023-10-06 ENCOUNTER — HOSPITAL ENCOUNTER (OUTPATIENT)
Facility: HOSPITAL | Age: 68
Setting detail: OBSERVATION
Discharge: HOME HEALTH | End: 2023-10-08
Attending: STUDENT IN AN ORGANIZED HEALTH CARE EDUCATION/TRAINING PROGRAM | Admitting: HOSPITALIST
Payer: MEDICARE

## 2023-10-06 ENCOUNTER — APPOINTMENT (OUTPATIENT)
Dept: CT IMAGING | Facility: HOSPITAL | Age: 68
End: 2023-10-06
Payer: MEDICARE

## 2023-10-06 DIAGNOSIS — I63.81 LACUNAR STROKE (HCC): ICD-10-CM

## 2023-10-06 DIAGNOSIS — G51.0 BELL'S PALSY: Primary | ICD-10-CM

## 2023-10-06 DIAGNOSIS — H11.32 SUBCONJUNCTIVAL HEMORRHAGE OF LEFT EYE: ICD-10-CM

## 2023-10-06 DIAGNOSIS — M80.00XA AGE-RELATED OSTEOPOROSIS WITH CURRENT PATHOLOGICAL FRACTURE, INITIAL ENCOUNTER: ICD-10-CM

## 2023-10-06 PROBLEM — I63.9 CVA (CEREBRAL VASCULAR ACCIDENT) (HCC): Status: ACTIVE | Noted: 2023-10-06

## 2023-10-06 LAB
ANION GAP SERPL CALC-SCNC: 8 MMOL/L (ref 0–18)
BASOPHILS # BLD AUTO: 0.04 X10(3) UL (ref 0–0.2)
BASOPHILS NFR BLD AUTO: 0.5 %
BUN BLD-MCNC: 13 MG/DL (ref 7–18)
BUN/CREAT SERPL: 17.8 (ref 10–20)
CALCIUM BLD-MCNC: 9.8 MG/DL (ref 8.5–10.1)
CHLORIDE SERPL-SCNC: 110 MMOL/L (ref 98–112)
CO2 SERPL-SCNC: 26 MMOL/L (ref 21–32)
CREAT BLD-MCNC: 0.73 MG/DL
DEPRECATED RDW RBC AUTO: 45.2 FL (ref 35.1–46.3)
EGFRCR SERPLBLD CKD-EPI 2021: 88 ML/MIN/1.73M2 (ref 60–?)
EOSINOPHIL # BLD AUTO: 0.21 X10(3) UL (ref 0–0.7)
EOSINOPHIL NFR BLD AUTO: 2.6 %
ERYTHROCYTE [DISTWIDTH] IN BLOOD BY AUTOMATED COUNT: 13.5 % (ref 11–15)
GLUCOSE BLD-MCNC: 129 MG/DL (ref 70–99)
GLUCOSE BLDC GLUCOMTR-MCNC: 122 MG/DL (ref 70–99)
GLUCOSE BLDC GLUCOMTR-MCNC: 132 MG/DL (ref 70–99)
GLUCOSE BLDC GLUCOMTR-MCNC: 180 MG/DL (ref 70–99)
GLUCOSE BLDC GLUCOMTR-MCNC: 218 MG/DL (ref 70–99)
HCT VFR BLD AUTO: 42.1 %
HGB BLD-MCNC: 13.6 G/DL
IMM GRANULOCYTES # BLD AUTO: 0.02 X10(3) UL (ref 0–1)
IMM GRANULOCYTES NFR BLD: 0.2 %
LYMPHOCYTES # BLD AUTO: 2.62 X10(3) UL (ref 1–4)
LYMPHOCYTES NFR BLD AUTO: 32.1 %
MCH RBC QN AUTO: 29.3 PG (ref 26–34)
MCHC RBC AUTO-ENTMCNC: 32.3 G/DL (ref 31–37)
MCV RBC AUTO: 90.7 FL
MONOCYTES # BLD AUTO: 0.82 X10(3) UL (ref 0.1–1)
MONOCYTES NFR BLD AUTO: 10 %
NEUTROPHILS # BLD AUTO: 4.45 X10 (3) UL (ref 1.5–7.7)
NEUTROPHILS # BLD AUTO: 4.45 X10(3) UL (ref 1.5–7.7)
NEUTROPHILS NFR BLD AUTO: 54.6 %
OSMOLALITY SERPL CALC.SUM OF ELEC: 300 MOSM/KG (ref 275–295)
PLATELET # BLD AUTO: 269 10(3)UL (ref 150–450)
POTASSIUM SERPL-SCNC: 3.5 MMOL/L (ref 3.5–5.1)
RBC # BLD AUTO: 4.64 X10(6)UL
SODIUM SERPL-SCNC: 144 MMOL/L (ref 136–145)
TROPONIN I HIGH SENSITIVITY: 6 NG/L
WBC # BLD AUTO: 8.2 X10(3) UL (ref 4–11)

## 2023-10-06 PROCEDURE — 71045 X-RAY EXAM CHEST 1 VIEW: CPT | Performed by: STUDENT IN AN ORGANIZED HEALTH CARE EDUCATION/TRAINING PROGRAM

## 2023-10-06 PROCEDURE — 99223 1ST HOSP IP/OBS HIGH 75: CPT | Performed by: HOSPITALIST

## 2023-10-06 PROCEDURE — B246ZZZ ULTRASONOGRAPHY OF RIGHT AND LEFT HEART: ICD-10-PCS | Performed by: INTERNAL MEDICINE

## 2023-10-06 PROCEDURE — 70450 CT HEAD/BRAIN W/O DYE: CPT

## 2023-10-06 PROCEDURE — 70551 MRI BRAIN STEM W/O DYE: CPT | Performed by: STUDENT IN AN ORGANIZED HEALTH CARE EDUCATION/TRAINING PROGRAM

## 2023-10-06 RX ORDER — KETOROLAC TROMETHAMINE 15 MG/ML
15 INJECTION, SOLUTION INTRAMUSCULAR; INTRAVENOUS ONCE
Status: COMPLETED | OUTPATIENT
Start: 2023-10-06 | End: 2023-10-06

## 2023-10-06 RX ORDER — ASPIRIN 325 MG
325 TABLET ORAL DAILY
Status: DISCONTINUED | OUTPATIENT
Start: 2023-10-07 | End: 2023-10-07

## 2023-10-06 RX ORDER — ASPIRIN 81 MG/1
324 TABLET, CHEWABLE ORAL ONCE
Status: COMPLETED | OUTPATIENT
Start: 2023-10-06 | End: 2023-10-06

## 2023-10-06 RX ORDER — TEMAZEPAM 15 MG/1
15 CAPSULE ORAL NIGHTLY PRN
Status: DISCONTINUED | OUTPATIENT
Start: 2023-10-06 | End: 2023-10-08

## 2023-10-06 RX ORDER — ASPIRIN 325 MG
325 TABLET ORAL DAILY
Status: DISCONTINUED | OUTPATIENT
Start: 2023-10-06 | End: 2023-10-06 | Stop reason: SDUPTHER

## 2023-10-06 RX ORDER — ACETAMINOPHEN 500 MG
1000 TABLET ORAL ONCE
Status: DISCONTINUED | OUTPATIENT
Start: 2023-10-06 | End: 2023-10-08

## 2023-10-06 RX ORDER — LABETALOL HYDROCHLORIDE 5 MG/ML
10 INJECTION, SOLUTION INTRAVENOUS EVERY 10 MIN PRN
Status: DISCONTINUED | OUTPATIENT
Start: 2023-10-06 | End: 2023-10-08

## 2023-10-06 RX ORDER — METHYLPREDNISOLONE SODIUM SUCCINATE 125 MG/2ML
80 INJECTION, POWDER, LYOPHILIZED, FOR SOLUTION INTRAMUSCULAR; INTRAVENOUS ONCE
Status: COMPLETED | OUTPATIENT
Start: 2023-10-06 | End: 2023-10-06

## 2023-10-06 RX ORDER — NICOTINE POLACRILEX 4 MG
15 LOZENGE BUCCAL
Status: DISCONTINUED | OUTPATIENT
Start: 2023-10-06 | End: 2023-10-08

## 2023-10-06 RX ORDER — ONDANSETRON 2 MG/ML
4 INJECTION INTRAMUSCULAR; INTRAVENOUS EVERY 6 HOURS PRN
Status: DISCONTINUED | OUTPATIENT
Start: 2023-10-06 | End: 2023-10-08

## 2023-10-06 RX ORDER — METOCLOPRAMIDE HYDROCHLORIDE 5 MG/ML
10 INJECTION INTRAMUSCULAR; INTRAVENOUS EVERY 8 HOURS PRN
Status: DISCONTINUED | OUTPATIENT
Start: 2023-10-06 | End: 2023-10-07

## 2023-10-06 RX ORDER — TETRACAINE HYDROCHLORIDE 5 MG/ML
1 SOLUTION OPHTHALMIC ONCE
Status: DISCONTINUED | OUTPATIENT
Start: 2023-10-06 | End: 2023-10-06

## 2023-10-06 RX ORDER — NICOTINE POLACRILEX 4 MG
30 LOZENGE BUCCAL
Status: DISCONTINUED | OUTPATIENT
Start: 2023-10-06 | End: 2023-10-08

## 2023-10-06 RX ORDER — ASPIRIN 300 MG/1
300 SUPPOSITORY RECTAL DAILY
Status: DISCONTINUED | OUTPATIENT
Start: 2023-10-06 | End: 2023-10-06 | Stop reason: SDUPTHER

## 2023-10-06 RX ORDER — HEPARIN SODIUM 5000 [USP'U]/ML
5000 INJECTION, SOLUTION INTRAVENOUS; SUBCUTANEOUS EVERY 12 HOURS SCHEDULED
Status: DISCONTINUED | OUTPATIENT
Start: 2023-10-06 | End: 2023-10-08

## 2023-10-06 RX ORDER — TETRACAINE HYDROCHLORIDE 5 MG/ML
1 SOLUTION OPHTHALMIC ONCE
Status: COMPLETED | OUTPATIENT
Start: 2023-10-06 | End: 2023-10-06

## 2023-10-06 RX ORDER — ASPIRIN 300 MG/1
300 SUPPOSITORY RECTAL DAILY
Status: DISCONTINUED | OUTPATIENT
Start: 2023-10-07 | End: 2023-10-07

## 2023-10-06 RX ORDER — HYDRALAZINE HYDROCHLORIDE 20 MG/ML
10 INJECTION INTRAMUSCULAR; INTRAVENOUS EVERY 2 HOUR PRN
Status: DISCONTINUED | OUTPATIENT
Start: 2023-10-06 | End: 2023-10-08

## 2023-10-06 RX ORDER — ACETAMINOPHEN 500 MG
500 TABLET ORAL EVERY 4 HOURS PRN
Status: DISCONTINUED | OUTPATIENT
Start: 2023-10-06 | End: 2023-10-08

## 2023-10-06 RX ORDER — DEXTROSE MONOHYDRATE 25 G/50ML
50 INJECTION, SOLUTION INTRAVENOUS
Status: DISCONTINUED | OUTPATIENT
Start: 2023-10-06 | End: 2023-10-08

## 2023-10-06 NOTE — ED INITIAL ASSESSMENT (HPI)
Patient arrives to ER Evaluation of blood shot eye and severe pressure. Patient Prydeinig speaking.  used    Patient states around 10;40pm last night patient had some arm pain on the left side. About an hour PTA patient had left sided facial pain and felt a pop in her eye, that is when she noticed the blood shot eye. This RN and Another RN noted left slight facial droop. Stroke alert called. Pt brought to CT by this RN. Endorses dizziness, and left eye visual problems.

## 2023-10-06 NOTE — CONSULTS
Brandon Ang    PATIENT'S NAME: Bruno Yeh   ATTENDING PHYSICIAN: Bayron Leos MD   CONSULTING PHYSICIAN: Carlos Sanches. Myles Plascencia MD   PATIENT ACCOUNT#:   [id number]    LOCATION:  [address]  MEDICAL RECORD #:   [id number]       YOB: 1955  ADMISSION DATE:       10/06/2023      CONSULT DATE:  10/06/2023    REPORT OF CONSULTATION      HISTORY OF PRESENT ILLNESS:  This 42-year-old lady was seen by me after a call from the ER physician. This patient has redness in the left eye, and she also has a facial palsy on the left eye. Concern was whether they are related or not. PAST MEDICAL HISTORY:  She is known diabetic and hypertensive and has high cholesterol. OPHTHALMOLOGIC EXAMINATION:  When I saw her, she has pretty good eyesight, 20/40 in each eye. The left eye examination showed a large subconjunctival hemorrhage occupying the medial inferior and covering the temporal part of the sclera. The cornea was clear. Both eyes, the anterior chambers were clear. Pupils were round and reactive to direct and indirect light. She has lenticular opacities, both eyes. Examination of the fundus, when I looked at the optic disc, the disc was normal.  The vessels were narrow. I did not see any changes of diabetes in the back of the eye in the macular area. Ocular motility was normal.  She did show facial weakness on the left side of the face when she smiled or when she closed her teeth. The eyeball itself was not involved, and she closed the eyelids very tightly. It almost took equal pressure to open them. ASSESSMENT AND PLAN:  So the facial palsy so far is not involving the eye. As far as the hemorrhage is concerned, it is an incidental finding, and it is probably related to the high blood pressure or diabetes. No active treatment was indicated for the subconjunctival hemorrhage except I told her she can use artificial tears, either Systane or iVIZIA 3 times a day, both eyes. Thank you for the consult. Dictated By Rosa Jimenez MD  d: 10/06/2023 18:09:10  t: 10/06/2023 18:25:19  Dionne Barnhart [id number]  VQO/    cc: Erin Torres MD

## 2023-10-06 NOTE — ED QUICK NOTES
Orders for admission, patient is aware of plan and ready to go upstairs. Any questions, please call ED RN Shena Woods at extension [phone].      Patient Covid vaccination status: Fully vaccinated     COVID Test Ordered in ED: None    COVID Suspicion at Admission: N/A    Running Infusions:  None    Mental Status/LOC at time of transport: A&OX4    Other pertinent information: Armenian speaking  CIWA score: N/A   NIH score:  1 08-May-2021 23:09

## 2023-10-06 NOTE — TELEPHONE ENCOUNTER
Current Outpatient Medications:     metFORMIN  MG Oral Tablet 24 Hr, Take 1 tablet (750 mg total) by mouth daily. , Disp: 90 tablet, Rfl: 1

## 2023-10-07 ENCOUNTER — APPOINTMENT (OUTPATIENT)
Dept: MRI IMAGING | Facility: HOSPITAL | Age: 68
End: 2023-10-07
Attending: HOSPITALIST
Payer: MEDICARE

## 2023-10-07 ENCOUNTER — APPOINTMENT (OUTPATIENT)
Dept: ULTRASOUND IMAGING | Facility: HOSPITAL | Age: 68
End: 2023-10-07
Attending: HOSPITALIST
Payer: MEDICARE

## 2023-10-07 ENCOUNTER — APPOINTMENT (OUTPATIENT)
Dept: CV DIAGNOSTICS | Facility: HOSPITAL | Age: 68
End: 2023-10-07
Attending: HOSPITALIST
Payer: MEDICARE

## 2023-10-07 PROBLEM — R51.9 SEVERE HEADACHE: Status: ACTIVE | Noted: 2023-10-07

## 2023-10-07 PROBLEM — I63.9 CVA (CEREBRAL VASCULAR ACCIDENT) (HCC): Status: RESOLVED | Noted: 2023-10-06 | Resolved: 2023-10-07

## 2023-10-07 PROBLEM — I63.81 LACUNAR STROKE (HCC): Status: ACTIVE | Noted: 2023-10-07

## 2023-10-07 PROBLEM — G51.0 BELL'S PALSY: Status: RESOLVED | Noted: 2023-10-06 | Resolved: 2023-10-07

## 2023-10-07 LAB
ANION GAP SERPL CALC-SCNC: 5 MMOL/L (ref 0–18)
BASOPHILS # BLD AUTO: 0.01 X10(3) UL (ref 0–0.2)
BASOPHILS NFR BLD AUTO: 0.2 %
BUN BLD-MCNC: 21 MG/DL (ref 7–18)
BUN/CREAT SERPL: 28.8 (ref 10–20)
CALCIUM BLD-MCNC: 9.6 MG/DL (ref 8.5–10.1)
CHLORIDE SERPL-SCNC: 114 MMOL/L (ref 98–112)
CHOLEST SERPL-MCNC: 146 MG/DL (ref ?–200)
CO2 SERPL-SCNC: 24 MMOL/L (ref 21–32)
CREAT BLD-MCNC: 0.73 MG/DL
DEPRECATED RDW RBC AUTO: 43.8 FL (ref 35.1–46.3)
EGFRCR SERPLBLD CKD-EPI 2021: 88 ML/MIN/1.73M2 (ref 60–?)
EOSINOPHIL # BLD AUTO: 0 X10(3) UL (ref 0–0.7)
EOSINOPHIL NFR BLD AUTO: 0 %
ERYTHROCYTE [DISTWIDTH] IN BLOOD BY AUTOMATED COUNT: 13.3 % (ref 11–15)
EST. AVERAGE GLUCOSE BLD GHB EST-MCNC: 146 MG/DL (ref 68–126)
GLUCOSE BLD-MCNC: 174 MG/DL (ref 70–99)
GLUCOSE BLDC GLUCOMTR-MCNC: 106 MG/DL (ref 70–99)
GLUCOSE BLDC GLUCOMTR-MCNC: 112 MG/DL (ref 70–99)
GLUCOSE BLDC GLUCOMTR-MCNC: 135 MG/DL (ref 70–99)
GLUCOSE BLDC GLUCOMTR-MCNC: 166 MG/DL (ref 70–99)
HBA1C MFR BLD: 6.7 % (ref ?–5.7)
HCT VFR BLD AUTO: 40.8 %
HDLC SERPL-MCNC: 64 MG/DL (ref 40–59)
HGB BLD-MCNC: 13 G/DL
IMM GRANULOCYTES # BLD AUTO: 0.02 X10(3) UL (ref 0–1)
IMM GRANULOCYTES NFR BLD: 0.3 %
LDLC SERPL CALC-MCNC: 69 MG/DL (ref ?–100)
LYMPHOCYTES # BLD AUTO: 0.87 X10(3) UL (ref 1–4)
LYMPHOCYTES NFR BLD AUTO: 13.3 %
MCH RBC QN AUTO: 28.8 PG (ref 26–34)
MCHC RBC AUTO-ENTMCNC: 31.9 G/DL (ref 31–37)
MCV RBC AUTO: 90.5 FL
MONOCYTES # BLD AUTO: 0.05 X10(3) UL (ref 0.1–1)
MONOCYTES NFR BLD AUTO: 0.8 %
NEUTROPHILS # BLD AUTO: 5.6 X10 (3) UL (ref 1.5–7.7)
NEUTROPHILS # BLD AUTO: 5.6 X10(3) UL (ref 1.5–7.7)
NEUTROPHILS NFR BLD AUTO: 85.4 %
NONHDLC SERPL-MCNC: 82 MG/DL (ref ?–130)
OSMOLALITY SERPL CALC.SUM OF ELEC: 303 MOSM/KG (ref 275–295)
PLATELET # BLD AUTO: 248 10(3)UL (ref 150–450)
POTASSIUM SERPL-SCNC: 4.3 MMOL/L (ref 3.5–5.1)
POTASSIUM SERPL-SCNC: 4.3 MMOL/L (ref 3.5–5.1)
RBC # BLD AUTO: 4.51 X10(6)UL
SODIUM SERPL-SCNC: 143 MMOL/L (ref 136–145)
TRIGL SERPL-MCNC: 62 MG/DL (ref 30–149)
VLDLC SERPL CALC-MCNC: 9 MG/DL (ref 0–30)
WBC # BLD AUTO: 6.6 X10(3) UL (ref 4–11)

## 2023-10-07 PROCEDURE — 93306 TTE W/DOPPLER COMPLETE: CPT | Performed by: HOSPITALIST

## 2023-10-07 PROCEDURE — 99233 SBSQ HOSP IP/OBS HIGH 50: CPT | Performed by: INTERNAL MEDICINE

## 2023-10-07 PROCEDURE — 3044F HG A1C LEVEL LT 7.0%: CPT | Performed by: FAMILY MEDICINE

## 2023-10-07 PROCEDURE — 99223 1ST HOSP IP/OBS HIGH 75: CPT | Performed by: OTHER

## 2023-10-07 PROCEDURE — 70553 MRI BRAIN STEM W/O & W/DYE: CPT | Performed by: HOSPITALIST

## 2023-10-07 PROCEDURE — 93880 EXTRACRANIAL BILAT STUDY: CPT | Performed by: HOSPITALIST

## 2023-10-07 RX ORDER — SODIUM CHLORIDE 9 MG/ML
INJECTION, SOLUTION INTRAVENOUS CONTINUOUS
Status: ACTIVE | OUTPATIENT
Start: 2023-10-07 | End: 2023-10-08

## 2023-10-07 RX ORDER — MORPHINE SULFATE 2 MG/ML
1 INJECTION, SOLUTION INTRAMUSCULAR; INTRAVENOUS ONCE
Status: COMPLETED | OUTPATIENT
Start: 2023-10-07 | End: 2023-10-07

## 2023-10-07 RX ORDER — ACETAMINOPHEN 325 MG/1
650 TABLET ORAL EVERY 6 HOURS PRN
Status: DISCONTINUED | OUTPATIENT
Start: 2023-10-07 | End: 2023-10-08

## 2023-10-07 RX ORDER — DIPHENHYDRAMINE HYDROCHLORIDE 50 MG/ML
25 INJECTION INTRAMUSCULAR; INTRAVENOUS EVERY 6 HOURS
Status: DISCONTINUED | OUTPATIENT
Start: 2023-10-07 | End: 2023-10-08

## 2023-10-07 RX ORDER — GADOTERATE MEGLUMINE 376.9 MG/ML
15 INJECTION INTRAVENOUS
Status: COMPLETED | OUTPATIENT
Start: 2023-10-07 | End: 2023-10-07

## 2023-10-07 RX ORDER — LOSARTAN POTASSIUM AND HYDROCHLOROTHIAZIDE 12.5; 1 MG/1; MG/1
1 TABLET ORAL DAILY
Status: DISCONTINUED | OUTPATIENT
Start: 2023-10-07 | End: 2023-10-07

## 2023-10-07 RX ORDER — PANTOPRAZOLE SODIUM 40 MG/1
40 TABLET, DELAYED RELEASE ORAL
Status: DISCONTINUED | OUTPATIENT
Start: 2023-10-07 | End: 2023-10-08

## 2023-10-07 RX ORDER — PROCHLORPERAZINE EDISYLATE 5 MG/ML
10 INJECTION INTRAMUSCULAR; INTRAVENOUS EVERY 6 HOURS
Status: DISCONTINUED | OUTPATIENT
Start: 2023-10-07 | End: 2023-10-08

## 2023-10-07 RX ORDER — ASPIRIN 81 MG/1
81 TABLET, CHEWABLE ORAL DAILY
Status: DISCONTINUED | OUTPATIENT
Start: 2023-10-08 | End: 2023-10-08

## 2023-10-07 RX ORDER — ATORVASTATIN CALCIUM 10 MG/1
10 TABLET, FILM COATED ORAL EVERY 24 HOURS
Status: DISCONTINUED | OUTPATIENT
Start: 2023-10-07 | End: 2023-10-08

## 2023-10-07 NOTE — H&P
[facility]    PATIENT'S NAME: Giuesppe Bledsoe PHYSICIAN: Hu Rdz MD   PATIENT ACCOUNT#:   [de-identified]    LOCATION:  [address]  MEDICAL RECORD #:   [id number]       YOB: 1955  ADMISSION DATE:       10/06/2023    HISTORY AND PHYSICAL EXAMINATION    CHIEF COMPLAINT:  Possible cerebrovascular accident. HISTORY OF PRESENT ILLNESS:  Patient is a 73-year-old  female who came into emergency department for evaluation of pain and achiness in her left upper and lower extremities since last night associated with tingling and numbness. Initially CBC and chemistry were unremarkable. MRI scan of the brain fast sequence was negative for acute findings. Chest x-ray showed no acute findings. CT scan of the brain was negative. Patient did have left facial droop and she was given full dose aspirin. Also was given a dose of Solu-Medrol for possibility of Bell palsy and she will be admitted to the hospital for further management. PAST MEDICAL HISTORY:  Hypertension; hyperlipidemia; generalized osteoarthritis; diabetes mellitus type 2, noninsulin dependent; psoriasis; right kidney angiomyolipoma; gastritis; and peptic ulcer disease. PAST SURGICAL HISTORY:  , cholecystectomy, lumbar laminectomy and fusion, tonsillectomy, and right total knee arthroplasty. MEDICATIONS:  Please see medication reconciliation list.  Patient reports does not take aspirin on daily basis. ALLERGIES:  Lisinopril. She had side effects to codeine and [place].    FAMILY HISTORY:  Mother had diabetes mellitus type 2. Father had prostate cancer. SOCIAL HISTORY:  Smokes a few cigarettes a day. No alcohol or drug use. Lives with her family. Independent for basic activities of daily living.      REVIEW OF SYSTEMS:  Patient reports since yesterday she has been having heavy sensation and achy discomfort in her left upper and lower extremities, then had paresthesias and discomfort lasted throughout the night until this morning. Noted in the emergency room to have left facial droop. Other 12-point review of systems is negative. Patient said she felt a pop in her eye and noted that there is subconjunctival blood. Other 12-point review of systems is negative. PHYSICAL EXAMINATION:    GENERAL:  Alert and oriented to time, place, and person. No acute distress. VITAL SIGNS:  Temperature 98.4, pulse 57, respiratory rate 14, blood pressure 139/62, pulse ox 98% on room air. HEENT:  Atraumatic. Oropharynx clear. Dry mucous membranes. Normal hard and soft palate. Eyes:  Anicteric sclerae. NECK:  Supple. No lymphadenopathy. Trachea midline. Full range of motion. LUNGS:  Clear to auscultation bilaterally. Normal respiratory effort. HEART:  Regular rate, rhythm. S1 and S2 auscultated. No murmur. ABDOMEN:  Soft, nondistended. No tenderness. Positive bowel sounds. EXTREMITIES:  No edema, clubbing, or cyanosis. NEUROLOGIC:  Left nasolabial droop noted on exam.  Patient is able to wrinkle her forehead slightly. No weakness in bilateral eyelids. Left upper extremity hand  weakness. No other focal findings noted on exam.    ASSESSMENT:    1. Possible cerebrovascular accident involving right subcortical area. 2.   Left facial droop, possible Bell palsy. 3.   Left eye subconjunctival hemorrhage. 4.   Hypertension. 5.   Diabetes mellitus type 2. PLAN:  Patient will be admitted to telemetry floor. Obtain MRI scan of the brain with and without contrast.  Obtain carotid ultrasound, 2D echocardiogram with Doppler. Start her on full dose aspirin. Monitor her neurological status. Obtain physical and occupational therapy and neurology consult. Monitor Accu-Cheks. Further recommendations to follow.      Dictated By Torsten Peraza MD  d: 10/06/2023 18:20:17  t: 10/06/2023 19:02:27  Job [id number]  /    cc: Jessica Hartmann MD

## 2023-10-07 NOTE — PHYSICAL THERAPY NOTE
PHYSICAL THERAPY EVALUATION - INPATIENT     Room Number: 332/332-A  Evaluation Date: 10/7/2023  Type of Evaluation: Initial   Physician Order: PT Eval and Treat    Presenting Problem: CVA/bell's palsy  Co-Morbidities : DM, HTN, inc cholesterol  Reason for Therapy: Mobility Dysfunction and Discharge Planning    PHYSICAL THERAPY ASSESSMENT   Patient is a 77year old female admitted 10/6/2023 for facil droop. Pt ok to be seen per RN. Pt's dtr present, translating for pt, pt declined . Pt's CT scans negative for CVA. Pt still with asymmetry with smile. At baseline, pt indep with ADLs. Pt does not amb with RW, though she has one. Pt demo'd safety with bed mobility and transfers, furniture walked around hospital room. Discussed with pt and pt's dtr use of RW at home for safety, ashley after time in the hospital and with acquired weakness. Pt's dtr also brought up that pt should not be walking her dogs. Pt has 2 small dogs that she typically walks at the same time. Pt initially said she would walk them individually, but this therapist and dtr stated that pt should not be walking dogs upon d/c home. Reviewed home safety with RW use, limiting stairs and using when dtr home, and activity pacing. Pt stated she understood. Pt states she will use RW at home. Patient's current functional deficits include gait and stairs, which are below the patient's pre-admission status. Pt will benefit form continued inpt PT to facil return to PLOF. Pt will benefit from HHPT upon d/c, pt and pt's dtr in agreement. D/W RN. The patient's Approx Degree of Impairment: 46.58% has been calculated based on documentation in the [facility] '6 clicks' Inpatient Basic Mobility Short Form. Research supports that patients with this level of impairment may benefit from home with HHPT. Patient will benefit from continued IP PT services to address these deficits in preparation for discharge.     DISCHARGE RECOMMENDATIONS  PT Discharge Recommendations: Home with home health PT    PLAN  PT Treatment Plan: Bed mobility; Body mechanics; Energy conservation;Patient education; Family education;Gait training;Strengthening;Stair training;Transfer training;Balance training  Rehab Potential : Good  Frequency (Obs): 5x/week       PHYSICAL THERAPY MEDICAL/SOCIAL HISTORY         Problem List  Principal Problem:    Lacunar stroke ([address]  Active Problems:    Subconjunctival hemorrhage of left eye    Severe headache      HOME SITUATION  Home Situation  Type of Home: House  Home Layout: One level  Stairs to Enter : 5  Railing: Yes  Stairs to Bedroom: 0  Lives With: Daughter (dtr works during the day)  Drives: Yes  Patient Owned Equipment: Rolling walker;Cane (grab bar shower (tub/shower))  Patient Regularly Uses: Glasses     Prior Level of [place]: indep with ADLs, amb without AD    SUBJECTIVE  I am still swollen by my eye. PHYSICAL THERAPY EXAMINATION     OBJECTIVE  Precautions: Bed/chair alarm  Fall Risk: Standard fall risk    WEIGHT BEARING RESTRICTION                PAIN ASSESSMENT  Ratin  Location: back and spine  Management Techniques: Activity promotion; Body mechanics;Repositioning    COGNITION  Overall Cognitive Status:  WFL - within functional limits    RANGE OF MOTION AND STRENGTH ASSESSMENT  Upper extremity ROM and strength are within functional limits   Lower extremity ROM is within functional limits   Lower extremity strength is limited functionally    BALANCE  Static Sitting: Fair +  Dynamic Sitting: Fair -  Static Standing: Fair -  Dynamic Standing: Fair -    ADDITIONAL TESTS                                    NEUROLOGICAL FINDINGS                      ACTIVITY TOLERANCE  Pulse: 59  Heart Rate Source: Monitor     BP: 130/56  BP Location: Right arm  BP Method: Automatic  Patient Position: Lying    O2 WALK  Oxygen Therapy  SPO2% on Room Air at Rest: 96    AM-PAC '6-Clicks' INPATIENT SHORT FORM - BASIC MOBILITY  How much difficulty does the patient currently have. .. Patient Difficulty: Turning over in bed (including adjusting bedclothes, sheets and blankets)?: A Little   Patient Difficulty: Sitting down on and standing up from a chair with arms (e.g., wheelchair, bedside commode, etc.): A Little   Patient Difficulty: Moving from lying on back to sitting on the side of the bed?: A Little   How much help from another person does the patient currently need. .. Help from Another: Moving to and from a bed to a chair (including a wheelchair)?: A Little   Help from Another: Need to walk in hospital room?: A Little   Help from Another: Climbing 3-5 steps with a railing?: A Little     AM-PAC Score:  Raw Score: 18   Approx Degree of Impairment: 46.58%   Standardized Score (AM-PAC Scale): 43.63   CMS Modifier (G-Code): CK    FUNCTIONAL ABILITY STATUS  Functional Mobility/Gait Assessment  Gait Assistance: Contact guard assist  Distance (ft): 20  Assistive Device: None (furniture walked)  Pattern: Shuffle    Bed Mobility: CGA    Transfers: CGA    Exercise/Education Provided:  Bed mobility  Body mechanics  Gait training  Transfer training  PT eval    Patient End of Session: Up in chair;Needs met;Call light within reach;RN aware of session/findings; All patient questions and concerns addressed; Family present    CURRENT GOALS    Goals to be met by: 10/14/23  Patient Goal Patient's self-stated goal is: return home, walk dogs   Goal #1 Patient is able to demonstrate supine - sit EOB @ level: modified independent     Goal #1   Current Status    Goal #2 Patient is able to demonstrate transfers Sit to/from Stand at assistance level: modified independent with walker - rolling     Goal #2  Current Status    Goal #3 Patient is able to ambulate 100 feet with assist device: walker - rolling at assistance level: modified independent   Goal #3   Current Status    Goal #4 Patient will negotiate 5 stairs with rail and supervision   Goal #4   Current Status    Goal #5 Patient to demonstrate independence with home activity/exercise instructions provided to patient in preparation for discharge.    Goal #5   Current Status    Goal #6    Goal #6  Current Status      Patient Evaluation Complexity Level:  History Low - no personal factors and/or co-morbidities   Examination of body systems Low - addressing 1-2 elements   Clinical Presentation Low - Stable   Clinical Decision Making Low Complexity       Gait Training: 10 minutes  Therapeutic Activity: 13 minutes

## 2023-10-07 NOTE — PLAN OF CARE
Arie Severance is A&Ox4 on RA with no c/o of pain Patient seen by SLP/PT/OT. See notes. Diet orders placed. Patient had ultrasounds done and 2D echo. See results. Neuro-checks and NIH done per orders. Plan to have MRI. Plan to discharge home with [facility]. Problem: Patient Centered Care  Goal: Patient preferences are identified and integrated in the patient's plan of care  Description: Interventions:  - What would you like us to know as we care for you? Home with family  - Provide timely, complete, and accurate information to patient/family  - Incorporate patient and family knowledge, values, beliefs, and cultural backgrounds into the planning and delivery of care  - Encourage patient/family to participate in care and decision-making at the level they choose  - Honor patient and family perspectives and choices  Outcome: Progressing     Problem: Diabetes/Glucose Control  Goal: Glucose maintained within prescribed range  Description: INTERVENTIONS:  - Monitor Blood Glucose as ordered  - Assess for signs and symptoms of hyperglycemia and hypoglycemia  - Administer ordered medications to maintain glucose within target range  - Assess barriers to adequate nutritional intake and initiate nutrition consult as needed  - Instruct patient on self management of diabetes  Outcome: Progressing     Problem: SAFETY ADULT - FALL  Goal: Free from fall injury  Description: INTERVENTIONS:  - Assess pt frequently for physical needs  - Identify cognitive and physical deficits and behaviors that affect risk of falls.   - [place] fall precautions as indicated by assessment.  - Educate pt/family on patient safety including physical limitations  - Instruct pt to call for assistance with activity based on assessment  - Modify environment to reduce risk of injury  - Provide assistive devices as appropriate  - Consider OT/PT consult to assist with strengthening/mobility  - Encourage toileting schedule  Outcome: Progressing     Problem: NEUROLOGICAL - ADULT  Goal: Achieves stable or improved neurological status  Description: INTERVENTIONS  - Assess for and report changes in neurological status  - Initiate measures to prevent increased intracranial pressure  - Maintain blood pressure and fluid volume within ordered parameters to optimize cerebral perfusion and minimize risk of hemorrhage  - Monitor temperature, glucose, and sodium.  Initiate appropriate interventions as ordered  Outcome: Progressing  Goal: Achieves maximal functionality and self care  Description: INTERVENTIONS  - Monitor swallowing and airway patency with patient fatigue and changes in neurological status  - Encourage and assist patient to increase activity and self care with guidance from PT/OT  - Encourage visually impaired, hearing impaired and aphasic patients to use assistive/communication devices  Outcome: Progressing     Problem: Patient/Family Goals  Goal: Patient/Family Long Term Goal  Description: Patient's Long Term Goal: improve mobility    Interventions:  - follow plan of care  -[facility]  -monitor vitals and labs  - See additional Care Plan goals for specific interventions  Outcome: Progressing  Goal: Patient/Family Short Term Goal  Description: Patient's Short Term Goal: home    Interventions:   - pt/ot/slp  -MRI  -medication management   - See additional Care Plan goals for specific interventions  Outcome: Progressing

## 2023-10-07 NOTE — CM/SW NOTE
10/07/23 1600   CM/SW Referral Data   Referral Source Physician   Reason for Referral Discharge planning   Informant Daughter   Medical Hx   Does patient have an established PCP? Yes  (Dr. Lindsey Portillo)   Patient Info   Patient's Current Mental Status at Time of Assessment Alert;Oriented   Patient Communication Issues Language barrier   Patient's [address]   Number of Levels in Home 1   Number of Stair in Home 5   Patient lives with Daughter   Patient Status Prior to Admission   Independent with ADLs and Mobility Yes   Services in place prior to admission DME/Supplies at home   Type of DME/Supplies Rollator Walker;Straight Cane   Choice of Post-Acute Provider   Informed patient of right to choose their preferred provider Yes   Patient/family choice Pending options     Due to pt primarily [place] speaking, pt informed SW via phone okay to called daughter Quinn Young. SW spoke w/ pt's daughter Quinn Young for initial assessment. Pt has hx of [facility] and is familiar w/ the service after having it arranged after a surgery a few years ago. PT recommending HHPT. Dtr agreeable [place] [phone] services being arranged and is open to options as she does not recall the name of the agency in the past.     SW sent [place] [phone] referrals via [employer] for review and options. F2F order entered in for RN/PT.      PLAN: Home w/ HHC, pending agency acceptance and pt choice    [id number]  10/07/23, [phone]

## 2023-10-07 NOTE — PLAN OF CARE
Patient affirms discomfort to BLE, Neuro checks, blood sugar checks,NPO pending swallow eval. Safety measures in place,call light and personal belonging within reach. Problem: Patient Centered Care  Goal: Patient preferences are identified and integrated in the patient's plan of care  Description: Interventions:  - What would you like us to know as we care for you? Lives at home with daughters  - Provide timely, complete, and accurate information to patient/family  - Incorporate patient and family knowledge, values, beliefs, and cultural backgrounds into the planning and delivery of care  - Encourage patient/family to participate in care and decision-making at the level they choose  - Honor patient and family perspectives and choices  10/7/2023 0717 by Mala Mendosa RN  Outcome: Progressing  10/7/2023 0154 by Mala Mendosa RN  Outcome: Progressing     Problem: Diabetes/Glucose Control  Goal: Glucose maintained within prescribed range  Description: INTERVENTIONS:  - Monitor Blood Glucose as ordered  - Assess for signs and symptoms of hyperglycemia and hypoglycemia  - Administer ordered medications to maintain glucose within target range  - Assess barriers to adequate nutritional intake and initiate nutrition consult as needed  - Instruct patient on self management of diabetes  10/7/2023 0717 by Mala Mendosa RN  Outcome: Progressing  10/7/2023 0154 by Mala Mendosa RN  Outcome: Progressing     Problem: SAFETY ADULT - FALL  Goal: Free from fall injury  Description: INTERVENTIONS:  - Assess pt frequently for physical needs  - Identify cognitive and physical deficits and behaviors that affect risk of falls.   - [place] fall precautions as indicated by assessment.  - Educate pt/family on patient safety including physical limitations  - Instruct pt to call for assistance with activity based on assessment  - Modify environment to reduce risk of injury  - Provide assistive devices as appropriate  - Consider OT/PT consult to assist with strengthening/mobility  - Encourage toileting schedule  10/7/2023 0717 by Court Duarte RN  Outcome: Progressing  10/7/2023 0154 by Court Duarte RN  Outcome: Progressing     Problem: NEUROLOGICAL - ADULT  Goal: Achieves stable or improved neurological status  Description: INTERVENTIONS  - Assess for and report changes in neurological status  - Initiate measures to prevent increased intracranial pressure  - Maintain blood pressure and fluid volume within ordered parameters to optimize cerebral perfusion and minimize risk of hemorrhage  - Monitor temperature, glucose, and sodium.  Initiate appropriate interventions as ordered  10/7/2023 0717 by Court Duarte RN  Outcome: Progressing  10/7/2023 0154 by Court Duarte RN  Outcome: Progressing  Goal: Achieves maximal functionality and self care  Description: INTERVENTIONS  - Monitor swallowing and airway patency with patient fatigue and changes in neurological status  - Encourage and assist patient to increase activity and self care with guidance from PT/OT  - Encourage visually impaired, hearing impaired and aphasic patients to use assistive/communication devices  10/7/2023 0717 by Court Duarte RN  Outcome: Progressing  10/7/2023 0154 by Court Duarte RN  Outcome: Progressing     Problem: Patient/Family Goals  Goal: Patient/Family Long Term Goal  Description: Patient's Long Term Goal: to go home    Interventions:  - See MD orders  - See additional Care Plan goals for specific interventions  Outcome: Progressing  Goal: Patient/Family Short Term Goal  Description: Patient's Short Term Goal: feel better    Interventions:   - Neuro checks  -VS  -medications  -labs  - See additional Care Plan goals for specific interventions  Outcome: Progressing

## 2023-10-07 NOTE — SLP NOTE
ADULT SWALLOWING EVALUATION    ASSESSMENT    ASSESSMENT/OVERALL IMPRESSION:    Orders received, chart reviewed, clinical bedside swallow evaluation complete per stroke protocol. Patient admitted from home with c/o L UE/LE heaviness and L facial droop; MRI brain, CTH and CXR 10/6/23 reviewed as below. No hx SLP service per this EMR review. Patient did not pass RN swallow screen 10/6/23 due to facial droop and remains NPO. RN authorizes visit. Patient's daughter at bedside, declines language line , offers language interpretation as needed for patient primary language Wolof. Patient received alert and upright in bed, afebrile, in NAD, oriented x4. Oral motor mechanism examination remarkable for mild L facial asymmetry with reduced L labial retraction on motor command. Speech intelligibility slightly compromised by reduced articulatory precision however remains 100% in conversation of known context. Patient self-administered bites of dry solid crackers and cup/straw sips thin liquids with oropharyngeal timing and control which appears adequate/functional and without overt signs aspiration/distress. At this time, patient presents without overt evidence oropharyngeal dysphagia at bedside and appears appropriate to continue current diet with standard aspiration precautions. Patient presents with reduced articulatory precision in connected speech in context of persisting L facial/labial weakness. Acute SLP service will continue to follow while in house to assess further needs pending ongoing neuro diagnostics. Plan and recommendations reviewed with patient, patient's daughter and RN at bedside. Written recommendations posted on whiteboard. FCM score: 7/7. RECOMMENDATIONS   Diet Recommendations - Solids: Regular  Diet Recommendations - Liquids: Thin Liquids  Aspiration Precautions: Upright position; Slow rate;Small bites and sips  Medication Administration Recommendations: One pill at a time  Treatment Plan/Recommendations: Aspiration precautions  Discharge Recommendations/Plan: Home    HISTORY   MEDICAL HISTORY  Reason for Referral: Stroke protocol;RN dysphagia screen    Problem List  Principal Problem:    Bell's palsy  Active Problems:    Subconjunctival hemorrhage of left eye    CVA (cerebral vascular accident) [facility])    Past Medical History  Past Medical History:   Diagnosis Date    Angiomyolipoma of right kidney 9/16/2022    Chronic low back pain     Diabetes ([facility]     Esophageal reflux     Gastritis     High blood pressure     High cholesterol     Osteoarthritis     Psoriasis     Stroke ([facility] 2012    \"small stroke\" many years ago    Visual impairment     readers     Prior Living Situation: Home with support  Diet Prior to Admission: Regular; Thin liquids  Precautions: Aspiration    Patient/Family Goals: Return home    SWALLOWING HISTORY  Current Diet Consistency: NPO  Dysphagia History: As above    Imaging Results:     MRI brain 10/6/23:  CONCLUSION:   1. Limited ER protocol 3 sequence imaging of the brain was performed. No acute intracranial abnormality. Specifically, no evidence of acute or early subacute infarction. 2. Background mild-to-moderate presumed sequelae of chronic microangiopathy throughout both cerebral hemispheres. 3. Nonspecific left greater than right mastoid effusions. 4. Dependent right maxillary sinus retention cyst.     CTH 10/6/23:  CONCLUSION:   1. No evidence of acute intracranial hemorrhage or extended signs of acute large vessel infarction. 2. Nonspecific white matter changes involving both cerebral hemispheres that most likely reflect sequelae of chronic microangiopathy. 3. Small nonspecific left mastoid effusion. 4. Lesser incidental findings as above.      CXR 10/6/23:  CONCLUSION: Normal       OBJECTIVE     Dentition: Functional  Symmetry: Reduced left facial  Strength: Reduced left facial  Range of Motion: Reduced left facial  Rate of Motion: Reduced    Voice Quality: Clear  Respiratory Status: Unlabored  Consistencies Trialed: Thin liquids; Hard solid  Method of Presentation: Self presentation  Patient Positioning: Upright;Midline    Oral Phase of Swallow: Within Functional Limits- adequate efficiency and control without appreciable oral stasis    Pharyngeal Phase of Swallow: Within Functional Limits - no overt signs aspiration/distress at bedside  (Please note: Silent aspiration cannot be evaluated clinically. Videofluoroscopic Swallow Study is required to rule-out silent aspiration.)    Esophageal Phase of Swallow: No complaints consistent with possible esophageal involvement      GOALS  Goal #1 Patient will demonstrate safe and efficient clinical tolerance for regular diet and thin liquids without overt signs aspiration/distress x1-2 f/u visits. In Progress   Goal #2 The patient/family/caregiver will demonstrate understanding and implementation of aspiration precautions and swallow strategies independently over 1-2 session(s).     In Progress     FOLLOW UP  Treatment Plan/Recommendations: Aspiration precautions  Number of Visits to Meet Established Goals: 1  Follow Up Needed (Documentation Required): Yes  SLP Follow-up Date: 10/09/23    Thank you for your referral.   If you have any questions, please contact Siddharth Boland, SLP  Siddharth Boland M.S. [facility] Pathologist  [phone]

## 2023-10-08 VITALS
SYSTOLIC BLOOD PRESSURE: 125 MMHG | BODY MASS INDEX: 32.69 KG/M2 | OXYGEN SATURATION: 97 % | DIASTOLIC BLOOD PRESSURE: 92 MMHG | TEMPERATURE: 98 F | HEIGHT: 60 IN | RESPIRATION RATE: 18 BRPM | WEIGHT: 166.5 LBS | HEART RATE: 66 BPM

## 2023-10-08 LAB
ATRIAL RATE: 61 BPM
GLUCOSE BLDC GLUCOMTR-MCNC: 110 MG/DL (ref 70–99)
GLUCOSE BLDC GLUCOMTR-MCNC: 190 MG/DL (ref 70–99)
P AXIS: -20 DEGREES
P-R INTERVAL: 140 MS
Q-T INTERVAL: 408 MS
QRS DURATION: 82 MS
QTC CALCULATION (BEZET): 410 MS
R AXIS: 14 DEGREES
T AXIS: 10 DEGREES
VENTRICULAR RATE: 61 BPM

## 2023-10-08 PROCEDURE — 99233 SBSQ HOSP IP/OBS HIGH 50: CPT | Performed by: OTHER

## 2023-10-08 PROCEDURE — 99239 HOSP IP/OBS DSCHRG MGMT >30: CPT | Performed by: INTERNAL MEDICINE

## 2023-10-08 RX ORDER — LOSARTAN POTASSIUM AND HYDROCHLOROTHIAZIDE 12.5; 1 MG/1; MG/1
1 TABLET ORAL DAILY
Qty: 90 TABLET | Refills: 1 | Status: SHIPPED | OUTPATIENT
Start: 2023-10-08

## 2023-10-08 RX ORDER — ASPIRIN 81 MG/1
81 TABLET, CHEWABLE ORAL DAILY
Qty: 30 TABLET | Refills: 0 | Status: SHIPPED | OUTPATIENT
Start: 2023-10-09 | End: 2023-11-08

## 2023-10-08 RX ORDER — EMPAGLIFLOZIN 25 MG/1
1 TABLET, FILM COATED ORAL DAILY
Qty: 90 TABLET | Refills: 1 | Status: SHIPPED | OUTPATIENT
Start: 2023-10-08

## 2023-10-08 RX ORDER — ALENDRONATE SODIUM 70 MG/1
70 TABLET ORAL
Qty: 12 TABLET | Refills: 3 | OUTPATIENT
Start: 2023-10-08

## 2023-10-08 NOTE — CM/SW NOTE
10/08/23 1300   Discharge disposition   Expected discharge disposition Home-Health   Post Acute Care Provider   LONG TERM ACUTE [facility] choice pending. dtr to f/up w/ SW post DC)   Discharge transportation Private car       Per chart, pt has DC order for today. Spoke to pt's dtr Jo Ann Calvo as f/up for [facility]. Dtr has not checked email yet. SW explained pt is cleared for DC. SW provided direct phone # and asked pt's dtr to call SW (even post DC) to notify of [facility] for services to be arranged. SW informed pt's dtr to leave a Vmail with choice on it if SW does not answer. Jo Ann Calvo expressed understanding and is agreeable. Pt is cleared from SW/CM stand point. UPDATE: Dtr Basil Desai notified SW - choice is [facility]. José Lynn w/ [facility] notified. [facility] reserved in Aidin.  [facility] instructions added to pt's AVS.      PLAN: Home w/ dtr & [facility]         FLAQUITA Mackay, [address] [facility] Manual Repair Warning Statement: We plan on removing the manually selected variable below in favor of our much easier automatic structured text blocks found in the previous tab. We decided to do this to help make the flow better and give you the full power of structured data. Manual selection is never going to be ideal in our platform and I would encourage you to avoid using manual selection from this point on, especially since I will be sunsetting this feature. It is important that you do one of two things with the customized text below. First, you can save all of the text in a word file so you can have it for future reference. Second, transfer the text to the appropriate area in the Library tab. Lastly, if there is a flap or graft type which we do not have you need to let us know right away so I can add it in before the variable is hidden. No need to panic, we plan to give you roughly 6 months to make the change.

## 2023-10-08 NOTE — TELEPHONE ENCOUNTER
Please review. Protocol failed / No Protocol. Requested Prescriptions   Pending Prescriptions Disp Refills    JARDIANCE 25 MG Oral Tab [Pharmacy Med Name: JARDIANCE 25MG TABLETS] 90 tablet 1     Sig: Take 1 tablet by mouth daily.        Diabetes Medication Protocol Failed - 10/6/2023  9:28 AM        Failed - Last A1C < 7.5 and within past 6 months     Lab Results   Component Value Date    A1C 6.7 (H) 10/07/2023             Failed - In person appointment or virtual visit in the past 6 mos or appointment in next 3 mos     Recent Outpatient Visits              3 months ago [address] [place], "[employer]", [address], APRN    Office Visit    4 months ago 16[facility], [address], [place], [facility] [address], APRN    Office Visit    6 months ago [address] [facility], [address], "[employer]", [address], APRN    Office Visit    7 months ago Type 2 diabetes mellitus without complication, without long-term current use of insulin [facility])    Paulina Norwood, Milad Craft MD    Office Visit    9 months ago Age-related osteoporosis with current pathological fracture, initial encounter    Paulina Norwood, Devorah Soria MD    Office Visit                      Passed - EGFRCR or GFRNAA > 50     GFR Evaluation  EGFRCR: 88 , resulted on 10/7/2023          Passed - GFR in the past 12 months          ALENDRONATE 70 MG Oral Tab [Pharmacy Med Name: ALENDRONATE 70MG TABLETS] 12 tablet 3     Sig: TAKE 1 TABLET(70 MG) BY MOUTH EVERY 7 DAYS       Osteoporosis Medication Protocol Passed - 10/6/2023  9:28 AM        Passed - In person appointment or virtual visit in the past 12 mos or appointment in next 3 mos     Recent Outpatient Visits              3 months ago [address] [facility], [address], "[employer]", [place] A, APRN Office Visit    4 months ago [address], [employer]    Office Visit    6 months ago 520 [facility], [address], [email or web address], [address], APRN    Office Visit    7 months ago Type 2 diabetes mellitus without complication, without long-term current use of insulin [facility])    [address], Miroslava Alvarez MD    Office Visit    9 months ago Age-related osteoporosis with current pathological fracture, initial encounter    Miroslava Pelaez MD    Office Visit                        LOSARTAN POTASSIUM-HCTZ 100-12.5 MG Oral Tab [Pharmacy Med Name: LOSARTAN/HCTZ 100/12.5MG TABLETS] 90 tablet 2     Sig: Take 1 tablet by mouth daily.        Hypertensive Medications Protocol Failed - 10/6/2023  9:28 AM        Failed - In person appointment or virtual visit in the past 6 months     Recent Outpatient Visits              3 months ago 22[address] [facility], [email or web address], [address], APRN    Office Visit    4 months ago 1610 [facility], [address], [facility], APRN    Office Visit    6 months ago 520 [facility], [address], [email or web address], [address], APRN    Office Visit    7 months ago Type 2 diabetes mellitus without complication, without long-term current use of insulin [facility])    [address], Paulina Polk, Miroslava Reynolds MD    Office Visit    9 months ago Age-related osteoporosis with current pathological fracture, initial encounter    [address], Miroslava Alvarez MD    Office Visit                      Passed - In person appointment in the past 12 or next 3 months     Recent Outpatient Visits              3 months ago Microhematuria    [facility], [address], [place] [address], Anitra A, APRN    Office Visit    4 months ago [address] [place], APRN    Office Visit    6 months ago 520 [facility], [address], [place] Jace, Chandni A, APRN    Office Visit    7 months ago Type 2 diabetes mellitus without complication, without long-term current use of insulin ([facility]    [address], [address], Deonte Palm MD    Office Visit    9 months ago Age-related osteoporosis with current pathological fracture, initial encounter    Susanne Alvarenga, Deonte Palm MD    Office Visit                      Passed - Last BP reading less than 140/90     BP Readings from Last 1 Encounters:  10/08/23 : (!) 125/92              Passed - CMP or BMP in past 6 months     Recent Results (from the past 4392 hour(s))   Basic Metabolic Panel (8)    Collection Time: 10/07/23  3:27 AM   Result Value Ref Range    Glucose 174 (H) 70 - 99 mg/dL    Sodium 143 136 - 145 mmol/L    Potassium 4.3 3.5 - 5.1 mmol/L    Chloride 114 (H) 98 - 112 mmol/L    CO2 24.0 21.0 - 32.0 mmol/L    Anion Gap 5 0 - 18 mmol/L    BUN 21 (H) 7 - 18 mg/dL    Creatinine 0.73 0.55 - 1.02 mg/dL    BUN/CREA Ratio 28.8 (H) 10.0 - 20.0    Calcium, Total 9.6 8.5 - 10.1 mg/dL    Calculated Osmolality 303 (H) 275 - 295 mOsm/kg    eGFR-Cr 88 >=60 mL/min/1.73m2     *Note: Due to a large number of results and/or encounters for the requested time period, some results have not been displayed. A complete set of results can be found in Results Review.                Passed - EGFRCR or GFRNAA > 50     GFR Evaluation  EGFRCR: 88 , resulted on 10/7/2023

## 2023-10-08 NOTE — CM/SW NOTE
Spoke to pt's dtr Herman Guzman via phone as f/up for [facility] arrangements. SW emailed [facility] list of quality data to pt's dtr via: [email or web address]. SW confirmed that SW/CM will f/up for choice likely tomorrow. Dtr expressed understanding and is agreeable. PLAN: Home w/ dtr & HH - pending choice & med clear      SW/CM to remain available for support and/or discharge planning.          Cori Lopes, MSW, [facility]

## 2023-10-08 NOTE — DISCHARGE INSTRUCTIONS
Sometimes managing your health at home requires assistance. The [place] team has recognized your preference to use [facility]. They can be reached by phone at [phone]. The fax number for your reference is [phone]. A representative from the home health agency will contact you or your family to schedule your first visit.

## 2023-10-08 NOTE — PLAN OF CARE
Patient Drowsy Dr Julian Guillen notified she said to hold the headache cocktail, safety measures in place, call light within reach. , bed locked at lowest level. Neuro Q4, home with [facility]. Problem: Patient Centered Care  Goal: Patient preferences are identified and integrated in the patient's plan of care  Description: Interventions:  - What would you like us to know as we care for you? Lives at home with daughter  - Provide timely, complete, and accurate information to patient/family  - Incorporate patient and family knowledge, values, beliefs, and cultural backgrounds into the planning and delivery of care  - Encourage patient/family to participate in care and decision-making at the level they choose  - Honor patient and family perspectives and choices  Outcome: Progressing     Problem: Diabetes/Glucose Control  Goal: Glucose maintained within prescribed range  Description: INTERVENTIONS:  - Monitor Blood Glucose as ordered  - Assess for signs and symptoms of hyperglycemia and hypoglycemia  - Administer ordered medications to maintain glucose within target range  - Assess barriers to adequate nutritional intake and initiate nutrition consult as needed  - Instruct patient on self management of diabetes  Outcome: Progressing     Problem: SAFETY ADULT - FALL  Goal: Free from fall injury  Description: INTERVENTIONS:  - Assess pt frequently for physical needs  - Identify cognitive and physical deficits and behaviors that affect risk of falls.   - [place] fall precautions as indicated by assessment.  - Educate pt/family on patient safety including physical limitations  - Instruct pt to call for assistance with activity based on assessment  - Modify environment to reduce risk of injury  - Provide assistive devices as appropriate  - Consider OT/PT consult to assist with strengthening/mobility  - Encourage toileting schedule  Outcome: Progressing     Problem: NEUROLOGICAL - ADULT  Goal: Achieves stable or improved neurological status  Description: INTERVENTIONS  - Assess for and report changes in neurological status  - Initiate measures to prevent increased intracranial pressure  - Maintain blood pressure and fluid volume within ordered parameters to optimize cerebral perfusion and minimize risk of hemorrhage  - Monitor temperature, glucose, and sodium.  Initiate appropriate interventions as ordered  Outcome: Progressing  Goal: Achieves maximal functionality and self care  Description: INTERVENTIONS  - Monitor swallowing and airway patency with patient fatigue and changes in neurological status  - Encourage and assist patient to increase activity and self care with guidance from PT/OT  - Encourage visually impaired, hearing impaired and aphasic patients to use assistive/communication devices  Outcome: Progressing     Problem: Patient/Family Goals  Goal: Patient/Family Long Term Goal  Description: Patient's Long Term Goal: to go home    Interventions:  -see MD orders  - See additional Care Plan goals for specific interventions  Outcome: Progressing  Goal: Patient/Family Short Term Goal  Description: Patient's Short Term Goal: to feel better    Interventions:   - V/S  -labs  -medications  -Neuro checks    - See additional Care Plan goals for specific interventions  Outcome: Progressing

## 2023-10-08 NOTE — HOME CARE LIAISON
Received referral via [facility] for Home Health services. Spoke w/ patient and her daughter Dian Ramos who is agreeable with [facility].  Contact information placed on AVS.

## 2023-10-09 ENCOUNTER — TELEPHONE (OUTPATIENT)
Dept: FAMILY MEDICINE CLINIC | Facility: CLINIC | Age: 68
End: 2023-10-09

## 2023-10-09 RX ORDER — EMPAGLIFLOZIN 25 MG/1
1 TABLET, FILM COATED ORAL DAILY
Qty: 90 TABLET | Refills: 1 | OUTPATIENT
Start: 2023-10-09

## 2023-10-09 RX ORDER — METFORMIN HYDROCHLORIDE 750 MG/1
750 TABLET, EXTENDED RELEASE ORAL DAILY
Qty: 90 TABLET | Refills: 0 | Status: SHIPPED | OUTPATIENT
Start: 2023-10-09

## 2023-10-09 NOTE — TELEPHONE ENCOUNTER
CSS please call patient to assist with scheduling appointment with PCP    Protocol failed for appointment only  90 day refill given on 10/09/23, appointment needed for further refills. Requested Prescriptions   Pending Prescriptions Disp Refills    metFORMIN  MG Oral Tablet 24 Hr 90 tablet 1     Sig: Take 1 tablet (750 mg total) by mouth daily.        Diabetes Medication Protocol Failed - 10/9/2023 10:34 AM        Failed - In person appointment or virtual visit in the past 6 mos or appointment in next 3 mos     Recent Outpatient Visits              3 months ago 2211 [facility], [employer], [address], APRN    Office Visit    4 months ago 1610 [facility], [address] [facility], [facility] [address], APRN    Office Visit    7 months ago 520 [facility], [address] [facility], [employer], [address], APRN    Office Visit    7 months ago Type 2 diabetes mellitus without complication, without long-term current use of insulin [facility])    Paulina Robison, Milad Prasad MD    Office Visit    9 months ago Age-related osteoporosis with current pathological fracture, initial encounter    Paulina Robison, Mahesh Acevedo MD    Office Visit                      Passed - Last A1C < 7.5 and within past 6 months     Lab Results   Component Value Date    A1C 6.7 (H) 10/07/2023             Passed - EGFRCR or GFRNAA > 50     GFR Evaluation  EGFRCR: 88 , resulted on 10/7/2023          Passed - GFR in the past 12 months              Recent Outpatient Visits              3 months ago [facility], [address] [facility], [employer], [address], APRN    Office Visit    4 months ago 1610 [facility], [address], [place], [facility] [address], APRN    Office Visit    7 months ago Microhematuria    [facility], [address] [facility] [address] [address], [employer], [address], APRN    Office Visit    7 months ago Type 2 diabetes mellitus without complication, without long-term current use of insulin [facility])    [address], [address], Milad Echavarria MD    Office Visit    9 months ago Age-related osteoporosis with current pathological fracture, initial encounter    [address] [facility], Jacki Gomes MD    Office Visit

## 2023-10-09 NOTE — TELEPHONE ENCOUNTER
Left a message with information below.   I also asked she call us back to confirm she received the message.

## 2023-10-09 NOTE — TELEPHONE ENCOUNTER
[facility]  [phone] , confidential line    Needs a verbal order from Dr Mena or his nurse, he will sign home health orders for patient.

## 2023-10-10 ENCOUNTER — PATIENT OUTREACH (OUTPATIENT)
Dept: CASE MANAGEMENT | Age: 68
End: 2023-10-10

## 2023-10-10 ENCOUNTER — TELEPHONE (OUTPATIENT)
Dept: FAMILY MEDICINE CLINIC | Facility: CLINIC | Age: 68
End: 2023-10-10

## 2023-10-10 DIAGNOSIS — Z02.9 ENCOUNTERS FOR UNSPECIFIED ADMINISTRATIVE PURPOSE: ICD-10-CM

## 2023-10-10 DIAGNOSIS — I63.311 CEREBROVASCULAR ACCIDENT (CVA) DUE TO THROMBOSIS OF RIGHT MIDDLE CEREBRAL ARTERY (HCC): ICD-10-CM

## 2023-10-10 DIAGNOSIS — E11.9 TYPE 2 DIABETES MELLITUS WITHOUT COMPLICATION, WITHOUT LONG-TERM CURRENT USE OF INSULIN (HCC): ICD-10-CM

## 2023-10-10 DIAGNOSIS — I10 ESSENTIAL HYPERTENSION: Primary | ICD-10-CM

## 2023-10-10 PROCEDURE — 1111F DSCHRG MED/CURRENT MED MERGE: CPT

## 2023-10-10 NOTE — TELEPHONE ENCOUNTER
Duplicate request, previously addressed.     Requested Prescriptions   Pending Prescriptions Disp Refills    JARDIANCE 25 MG Oral Tab [Pharmacy Med Name: Conchita Hooks 25MG TABLETS] 90 tablet 1     Sig: TAKE 1 TABLET BY MOUTH DAILY       Diabetes Medication Protocol Failed - 10/8/2023  2:41 PM        Failed - In person appointment or virtual visit in the past 6 mos or appointment in next 3 mos     Recent Outpatient Visits              3 months ago 520 [facility], [address], [employer], Mik Jaimes, APRN    Office Visit    4 months ago 1610 [facility], [address], [facility] 24, APRN    Office Visit    7 months ago 520 [facility], [address], [employer], Mik Jaimes, APRN    Office Visit    7 months ago Type 2 diabetes mellitus without complication, without long-term current use of insulin [facility])    [address], [facility], Milad Manzo MD    Office Visit    9 months ago Age-related osteoporosis with current pathological fracture, initial encounter    [address], [facility], Tory Esquivel MD    Office Visit                      Passed - Last A1C < 7.5 and within past 6 months     Lab Results   Component Value Date    A1C 6.7 (H) 10/07/2023             Passed - EGFRCR or GFRNAA > 50     GFR Evaluation  EGFRCR: 88 , resulted on 10/7/2023          Passed - GFR in the past 12 months

## 2023-10-10 NOTE — PROGRESS NOTES
Update: LU[facility]LLO spoke with [facility]. [facility] were [facility] by the insurance yesterday. The agency attempted to reach the patient's daughter, Lurdes Venegas, for scheduling today.

## 2023-10-10 NOTE — TELEPHONE ENCOUNTER
Spoke to the pt today for TCM. The patient was recently hospitalized for possible CVA/bell palsy. The pt does not have a HFU appt scheduled at this time and declined scheduling when offered by the [facility]. A TCM/HFU appt is recommended by 10/15/2023 as the pt is a high risk for readmission. Please advise. BOOK BY DATE (last date for TCM): 10/22/2023      Please f/u with the pt's daughter, Kai Olmedo and assist with scheduling a TCM/HFU appt. Thank you!

## 2023-10-10 NOTE — PROGRESS NOTES
TCM has contacted patient and patient needs additional appointments. Please contact patient for assistance with scheduling a follow-up appointment for neurology. [address] [facility] [address] 1 month  [address] [address] [facility] [address]    The patient would like her daughter, Carlene Kent contacted for scheduling. Thank you!

## 2023-10-10 NOTE — PROGRESS NOTES
Attempted to reach the patient to complete a [facility] FU call. Left a message, with the assistance of the language line, for the pt to call the NCM back at, [phone].

## 2023-10-11 NOTE — PROGRESS NOTES
Called pt daughter Cinda Sanches and LVM instructing to call back for assistance with scheduling an appt with Neurology :      Amber Conor 1 month  [address] [address] [address] [id number] [facility] [address]     The patient would like her daughter, Cinda Sanches contacted for scheduling. Thank you!

## 2023-10-13 NOTE — PROGRESS NOTES
Again, called pt daughter Lakisha Yarbrough and LVM instructing to call back for assistance with scheduling an appt with Neurology :      Bety Matthews 1 month  [address] [place] [address] [address]. [address] [place] [phone] [facility] [id number]     The patient would like her daughter, Lakisha Yarbrough contacted for scheduling. Thank you! Closing encounter.

## 2023-10-16 ENCOUNTER — TELEPHONE (OUTPATIENT)
Dept: FAMILY MEDICINE CLINIC | Facility: CLINIC | Age: 68
End: 2023-10-16

## 2023-10-16 NOTE — TELEPHONE ENCOUNTER
Per Nicole/PT,  saw patient today and did only for evaluation and no follow ups needed will re-access in a month.

## 2023-10-17 ENCOUNTER — OFFICE VISIT (OUTPATIENT)
Dept: FAMILY MEDICINE CLINIC | Facility: CLINIC | Age: 68
End: 2023-10-17

## 2023-10-17 ENCOUNTER — TELEPHONE (OUTPATIENT)
Dept: FAMILY MEDICINE CLINIC | Facility: CLINIC | Age: 68
End: 2023-10-17

## 2023-10-17 VITALS
SYSTOLIC BLOOD PRESSURE: 130 MMHG | DIASTOLIC BLOOD PRESSURE: 75 MMHG | WEIGHT: 167.19 LBS | BODY MASS INDEX: 32.83 KG/M2 | HEART RATE: 69 BPM | HEIGHT: 60 IN

## 2023-10-17 DIAGNOSIS — R42 VERTIGO: Primary | ICD-10-CM

## 2023-10-17 PROCEDURE — 3075F SYST BP GE 130 - 139MM HG: CPT | Performed by: FAMILY MEDICINE

## 2023-10-17 PROCEDURE — 1160F RVW MEDS BY RX/DR IN RCRD: CPT | Performed by: FAMILY MEDICINE

## 2023-10-17 PROCEDURE — 1159F MED LIST DOCD IN RCRD: CPT | Performed by: FAMILY MEDICINE

## 2023-10-17 PROCEDURE — 3078F DIAST BP <80 MM HG: CPT | Performed by: FAMILY MEDICINE

## 2023-10-17 PROCEDURE — 1111F DSCHRG MED/CURRENT MED MERGE: CPT | Performed by: FAMILY MEDICINE

## 2023-10-17 PROCEDURE — 1125F AMNT PAIN NOTED PAIN PRSNT: CPT | Performed by: FAMILY MEDICINE

## 2023-10-17 PROCEDURE — 3008F BODY MASS INDEX DOCD: CPT | Performed by: FAMILY MEDICINE

## 2023-10-17 PROCEDURE — 99213 OFFICE O/P EST LOW 20 MIN: CPT | Performed by: FAMILY MEDICINE

## 2023-10-17 RX ORDER — ERGOCALCIFEROL 1.25 MG/1
50000 CAPSULE ORAL WEEKLY
COMMUNITY
Start: 2023-10-06

## 2023-10-17 RX ORDER — MECLIZINE HCL 12.5 MG/1
12.5 TABLET ORAL 3 TIMES DAILY PRN
Qty: 40 TABLET | Refills: 1 | Status: SHIPPED | OUTPATIENT
Start: 2023-10-17

## 2023-10-17 NOTE — TELEPHONE ENCOUNTER
Dr. Mary Anne Rhodes call from [address] [place] [address] , with [facility]  Patient's date of birth and full name both confirmed. She evaluated patient today. And plans to do additional visits, for swallowing   Just an FYI. No need to call back, unless questions.

## 2023-10-21 ENCOUNTER — HOSPITAL ENCOUNTER (OUTPATIENT)
Dept: CV DIAGNOSTICS | Facility: HOSPITAL | Age: 68
Discharge: HOME | End: 2023-10-21
Attending: INTERNAL MEDICINE

## 2023-10-21 ENCOUNTER — MED REC SCAN ONLY (OUTPATIENT)
Dept: FAMILY MEDICINE CLINIC | Facility: CLINIC | Age: 68
End: 2023-10-21

## 2023-10-21 DIAGNOSIS — I63.81 LACUNAR STROKE (HCC): ICD-10-CM

## 2023-10-21 PROCEDURE — 93271 ECG/MONITORING AND ANALYSIS: CPT | Performed by: INTERNAL MEDICINE

## 2023-10-21 PROCEDURE — 93270 REMOTE 30 DAY ECG REV/REPORT: CPT | Performed by: INTERNAL MEDICINE

## 2023-11-02 ENCOUNTER — TELEPHONE (OUTPATIENT)
Dept: NEUROLOGY | Facility: CLINIC | Age: 68
End: 2023-11-02

## 2023-11-02 NOTE — TELEPHONE ENCOUNTER
Patient was told to follow up with Dr. Kirsten George in looking at her Chart   she was actually seen by Dr. Nica Dangelo in the [facility] in early October. I let her know she will need to follow up with Dr. Nica Dangelo (not Dr. Kirsten George)  Please advise day and time he can see her.

## 2023-11-03 NOTE — TELEPHONE ENCOUNTER
Appointment request received from staff. RN reviewed message and pt chart. Pt scheduled at requested time and date. Done.

## 2023-11-15 ENCOUNTER — LAB ENCOUNTER (OUTPATIENT)
Dept: LAB | Facility: HOSPITAL | Age: 68
End: 2023-11-15
Attending: Other
Payer: MEDICARE

## 2023-11-15 ENCOUNTER — OFFICE VISIT (OUTPATIENT)
Dept: NEUROLOGY | Facility: CLINIC | Age: 68
End: 2023-11-15
Payer: MEDICARE

## 2023-11-15 VITALS — BODY MASS INDEX: 32.79 KG/M2 | HEIGHT: 60 IN | WEIGHT: 167 LBS

## 2023-11-15 DIAGNOSIS — R51.9 CHRONIC DAILY HEADACHE: ICD-10-CM

## 2023-11-15 DIAGNOSIS — R41.3 MEMORY PROBLEM: ICD-10-CM

## 2023-11-15 DIAGNOSIS — I63.81 LACUNAR STROKE (HCC): Primary | ICD-10-CM

## 2023-11-15 LAB
AMMONIA PLAS-MCNC: <10 UMOL/L (ref 11–32)
FOLATE SERPL-MCNC: 23.9 NG/ML (ref 5.4–?)
TSI SER-ACNC: 0.8 MIU/ML (ref 0.55–4.78)
VIT B12 SERPL-MCNC: 387 PG/ML (ref 211–911)

## 2023-11-15 PROCEDURE — 82746 ASSAY OF FOLIC ACID SERUM: CPT

## 2023-11-15 PROCEDURE — 99214 OFFICE O/P EST MOD 30 MIN: CPT | Performed by: OTHER

## 2023-11-15 PROCEDURE — 82525 ASSAY OF COPPER: CPT

## 2023-11-15 PROCEDURE — 36415 COLL VENOUS BLD VENIPUNCTURE: CPT

## 2023-11-15 PROCEDURE — 84446 ASSAY OF VITAMIN E: CPT

## 2023-11-15 PROCEDURE — 1160F RVW MEDS BY RX/DR IN RCRD: CPT | Performed by: OTHER

## 2023-11-15 PROCEDURE — 84443 ASSAY THYROID STIM HORMONE: CPT

## 2023-11-15 PROCEDURE — 3008F BODY MASS INDEX DOCD: CPT | Performed by: OTHER

## 2023-11-15 PROCEDURE — 1159F MED LIST DOCD IN RCRD: CPT | Performed by: OTHER

## 2023-11-15 PROCEDURE — 82140 ASSAY OF AMMONIA: CPT

## 2023-11-15 PROCEDURE — 82607 VITAMIN B-12: CPT

## 2023-11-15 PROCEDURE — 83921 ORGANIC ACID SINGLE QUANT: CPT

## 2023-11-15 PROCEDURE — 84207 ASSAY OF VITAMIN B-6: CPT

## 2023-11-15 PROCEDURE — 84425 ASSAY OF VITAMIN B-1: CPT

## 2023-11-15 RX ORDER — ASPIRIN 81 MG/1
81 TABLET, CHEWABLE ORAL DAILY
Qty: 90 TABLET | Refills: 0 | Status: SHIPPED | OUTPATIENT
Start: 2023-11-15 | End: 2024-02-13

## 2023-11-15 RX ORDER — MEMANTINE HYDROCHLORIDE 5 MG/1
5 TABLET ORAL DAILY
Qty: 90 TABLET | Refills: 0 | Status: SHIPPED | OUTPATIENT
Start: 2023-11-15 | End: 2024-02-13

## 2023-11-16 ENCOUNTER — TELEPHONE (OUTPATIENT)
Dept: NEUROLOGY | Facility: CLINIC | Age: 68
End: 2023-11-16

## 2023-11-16 NOTE — TELEPHONE ENCOUNTER
Hi, can you let the patient know the following please:    Hello, thanks for doing this blood work. Your ammonia, thyroid function, vitamin O96 and folic acid levels have resulted. Your vitamin B12 levels are borderline low but the confirmatory test methylmalonic acid has not resulted yet so I would wait for that first before deciding on supplementation. I will follow-up the other blood test results with you when they are finalized. Thanks!

## 2023-11-17 ENCOUNTER — TELEPHONE (OUTPATIENT)
Dept: FAMILY MEDICINE CLINIC | Facility: CLINIC | Age: 68
End: 2023-11-17

## 2023-11-17 NOTE — TELEPHONE ENCOUNTER
Ryan, nurse at [facility] is calling to advise PCP , the patient  missed her home health visit today.  I called and visited the patient today with no answer.   Please advise.

## 2023-11-19 LAB
VIT E ALPHA TOCO: 11.1 MG/L
VIT E GAMMA TOCO: 2.3 MG/L
VITAMIN B1 WHOLE BLD: 113.8 NMOL/L
VITAMIN B6: 4.8 UG/L

## 2023-11-20 NOTE — TELEPHONE ENCOUNTER
Phone call made to Ryan left message on confidential VM. Acknowledge that pt missed appt but Dr ASHTON would like them to try again. LMTCB and advise if another attempt will be made.

## 2023-11-21 LAB — METHYLMALONIC ACID: 415 NMOL/L

## 2023-11-23 LAB — COPPER: 159 UG/DL

## 2023-11-24 ENCOUNTER — HOSPITAL ENCOUNTER (OUTPATIENT)
Dept: ELECTROPHYSIOLOGY | Facility: HOSPITAL | Age: 68
Discharge: HOME | End: 2023-11-24
Attending: Other
Payer: MEDICARE

## 2023-11-24 PROBLEM — R41.3 MEMORY PROBLEM: Status: ACTIVE | Noted: 2023-11-24

## 2023-11-24 PROCEDURE — 95816 EEG AWAKE AND DROWSY: CPT | Performed by: OTHER

## 2023-11-24 NOTE — PROCEDURES
EEG report    REFERRING PHYSICIAN: Lolis Wilson DO    PCP and phone number:  Wil Shrestha MD  [phone]    TECHNIQUE: 21 channels of EEG, 2 channels of EOG, and 1 channel of EKG were recorded utilizing the International 10/20 System. The recording was performed in a digitized monopolar referential format and playback was reformatted into various referential and bipolar montages utilizing appropriate filter settings. Automatic seizure and spike detection programs were utilized throughout the recording. Video was recorded during the study    CLINICAL DATA:  Patient is sent for the evaluation of possible seizures. MEDICATION:  Continuous Medications:      Scheduled Medications:    Current Outpatient Medications:     memantine 5 MG Oral Tab, Take 1 tablet (5 mg total) by mouth daily. , Disp: 90 tablet, Rfl: 0    aspirin (ASPIRIN 81) 81 MG Oral Chew Tab, Chew 1 tablet (81 mg total) by mouth daily. , Disp: 90 tablet, Rfl: 0    ergocalciferol 1.25 MG (67469 UT) Oral Cap, Take 1 capsule (50,000 Units total) by mouth once a week., Disp: , Rfl:     meclizine 12.5 MG Oral Tab, Take 1 tablet (12.5 mg total) by mouth 3 (three) times daily as needed. , Disp: 40 tablet, Rfl: 1    metFORMIN  MG Oral Tablet 24 Hr, Take 1 tablet (750 mg total) by mouth daily. **Appointment needed for further refills. , Disp: 90 tablet, Rfl: 0    Empagliflozin (JARDIANCE) 25 MG Oral Tab, Take 1 tablet by mouth daily. , Disp: 90 tablet, Rfl: 1    Losartan Potassium-HCTZ 100-12.5 MG Oral Tab, Take 1 tablet by mouth daily. , Disp: 90 tablet, Rfl: 1    ONETOUCH ULTRA In Vitro Strip, daily. , Disp: , Rfl:     Lancets (ONETOUCH DELICA PLUS [id number]) Does not apply Misc, 1 strip by In Vitro route daily. , Disp: , Rfl:     alendronate 70 MG Oral Tab, Take 1 tablet (70 mg total) by mouth every 7 days. , Disp: 12 tablet, Rfl: 3    atorvastatin 10 MG Oral Tab, Take 1 tablet (10 mg total) by mouth daily. , Disp: 90 tablet, Rfl: 3    PRN Medications:      ACTIVATION:  Hyperventilation: Not done  Photic stimulation: Done, no abnormalities  Sleep: Normal sleep architecture was seen. BACKGROUND  While the patient was awake, the posterior dominant rhythm consisted of well-regulated 9-10 Hz rhythmic waveforms, symmetrically distributed over both posterior quadrants and was reactive to eye opening. EEG ABNORMALITY  None    IMPRESSION:  This is a normal EEG. No focal, lateralized, or epileptiform features are noted. Clinical correlation required.

## 2023-11-30 ENCOUNTER — TELEPHONE (OUTPATIENT)
Dept: NEUROLOGY | Facility: CLINIC | Age: 68
End: 2023-11-30

## 2023-11-30 NOTE — TELEPHONE ENCOUNTER
Hello, can you let the patient know that her methylmalonic acid level is elevated and this does confirm a vitamin B12 deficiency? I recommend this be supplemented via injectable form to help get the levels up quickly. This can be done in our office or with her primary care doctor's office. Please let me know if she has a preference. Her copper levels are very mildly elevated at 159, but normal is below 158 so I do not think this is contributing to any symptoms. Her ammonia levels are low but we were checking for elevated levels we also do not think this is contributing to her symptoms.     The rest of her blood work was normal.    Please also let her know her EEG is normal.

## 2023-12-06 ENCOUNTER — TELEPHONE (OUTPATIENT)
Dept: FAMILY MEDICINE CLINIC | Facility: CLINIC | Age: 68
End: 2023-12-06

## 2023-12-06 NOTE — TELEPHONE ENCOUNTER
FYI recertifying patient for continued [facility] for diabetes and hypertension. No call back needed unless there are additional orders.

## 2023-12-08 ENCOUNTER — MED REC SCAN ONLY (OUTPATIENT)
Dept: FAMILY MEDICINE CLINIC | Facility: CLINIC | Age: 68
End: 2023-12-08

## 2023-12-08 ENCOUNTER — TELEPHONE (OUTPATIENT)
Dept: FAMILY MEDICINE CLINIC | Facility: CLINIC | Age: 68
End: 2023-12-08

## 2023-12-08 NOTE — TELEPHONE ENCOUNTER
Neli from [facility] is unable to reach pt for today's visit. Today's HH visit will be canceled.

## 2023-12-11 ENCOUNTER — TELEPHONE (OUTPATIENT)
Dept: FAMILY MEDICINE CLINIC | Facility: CLINIC | Age: 68
End: 2023-12-11

## 2023-12-11 NOTE — TELEPHONE ENCOUNTER
Spoke to JADIEL from [employer]. She wanted to update Dr. Janice Renee that they are discharging patient from home  health. They cannot get a hold of her. Her last home health visit was 10/30/23. Dr. Janice Renee, Amirah Mejia.

## 2024-01-02 NOTE — TELEPHONE ENCOUNTER
Pharmacy requesting refill      ergocalciferol 1.25 MG (10180 UT) Oral Cap, Take 1 capsule (50,000 Units total) by mouth once a week., Disp: , Rfl:

## 2024-01-03 NOTE — TELEPHONE ENCOUNTER
Please review; protocol failed.    Requested Prescriptions   Pending Prescriptions Disp Refills    ergocalciferol 1.25 MG (72068 UT) Oral Cap  0     Sig: Take 1 capsule (50,000 Units total) by mouth once a week.       There is no refill protocol information for this order          Future Appointments         Provider Department Appt Notes    In 6 days [facility] MRI RM2 (3T WIDE) [facility]     In 1 week [facility] MRI RM2 (3T WIDE) [facility] MRI             Recent Outpatient Visits              1 month ago Lacunar stroke (HCC)    [facility] Riya Emanuel DO    Office Visit    2 months ago Vertigo    [facility], Keegan Butler MD    Office Visit    6 months ago [facility]hematuria    [facility]    Office Visit    6 months ago Dysuria    [facility]    Office Visit    9 months ago Microhematuria    [facility]    Office Visit

## 2024-01-04 RX ORDER — ERGOCALCIFEROL 1.25 MG/1
50000 CAPSULE ORAL WEEKLY
Qty: 12 CAPSULE | Refills: 0 | Status: SHIPPED | OUTPATIENT
Start: 2024-01-04

## 2024-01-06 RX ORDER — METFORMIN HYDROCHLORIDE 750 MG/1
750 TABLET, EXTENDED RELEASE ORAL DAILY
Qty: 90 TABLET | Refills: 0 | Status: SHIPPED | OUTPATIENT
Start: 2024-01-06

## 2024-01-06 NOTE — TELEPHONE ENCOUNTER
Refill passed per [facility] protocol.     Requested Prescriptions   Pending Prescriptions Disp Refills    METFORMIN  MG Oral Tablet 24 Hr [Pharmacy Med Name: METFORMIN ER 750MG 24HR TABS] 90 tablet 0     Sig: TAKE 1 TABLET( 750 MG TOTAL) BY MOUTH DAILY. APPOINTMENT NEEDED FOR FURTHER REFILLS       Diabetes Medication Protocol Passed - 1/5/2024 10:19 AM        Passed - Last A1C < 7.5 and within past 6 months     Lab Results   Component Value Date    A1C 6.7 (H) 10/07/2023             Passed - In person appointment or virtual visit in the past 6 mos or appointment in next 3 mos     Recent Outpatient Visits              1 month ago Lacunar stroke (HCC)    [facility] Riya Emanuel DO    Office Visit    2 months ago Vertigo    [facility], Keegan Butler MD    Office Visit    6 months ago [facility]    Office Visit    7 months ago Dysuria    [facility]    Office Visit    9 months ago [facility]hematuria    [facility]    Office Visit          Future Appointments         Provider Department Appt Notes    In 3 days [facility] MRI RM2 (3T WIDE) [facility] MRI     In 5 days [facility] MRI RM2 (3T WIDE) [facility] MRI                Passed - EGFRCR or GFRNAA > 50     GFR Evaluation  EGFRCR: 88 , resulted on 10/7/2023          Passed - GFR in the past 12 months

## 2024-01-09 ENCOUNTER — HOSPITAL ENCOUNTER (OUTPATIENT)
Dept: MRI IMAGING | Facility: HOSPITAL | Age: 69
Discharge: HOME | End: 2024-01-09
Attending: Other
Payer: MEDICARE

## 2024-01-09 DIAGNOSIS — R41.3 MEMORY PROBLEM: ICD-10-CM

## 2024-01-09 PROCEDURE — A9575 INJ GADOTERATE MEGLUMI 0.1ML: HCPCS | Performed by: OTHER

## 2024-01-09 PROCEDURE — 70546 MR ANGIOGRAPH HEAD W/O&W/DYE: CPT | Performed by: OTHER

## 2024-01-09 RX ORDER — GADOTERATE MEGLUMINE 376.9 MG/ML
20 INJECTION INTRAVENOUS
Status: COMPLETED | OUTPATIENT
Start: 2024-01-09 | End: 2024-01-09

## 2024-01-09 RX ADMIN — GADOTERATE MEGLUMINE 20 ML: 376.9 INJECTION INTRAVENOUS at 15:36:00

## 2024-01-11 ENCOUNTER — HOSPITAL ENCOUNTER (OUTPATIENT)
Dept: MRI IMAGING | Facility: HOSPITAL | Age: 69
Discharge: HOME | End: 2024-01-11
Attending: Other
Payer: MEDICARE

## 2024-01-11 DIAGNOSIS — R41.3 MEMORY PROBLEM: ICD-10-CM

## 2024-01-11 PROCEDURE — 70553 MRI BRAIN STEM W/O & W/DYE: CPT | Performed by: OTHER

## 2024-01-11 PROCEDURE — A9575 INJ GADOTERATE MEGLUMI 0.1ML: HCPCS | Performed by: OTHER

## 2024-01-11 RX ORDER — GADOTERATE MEGLUMINE 376.9 MG/ML
15 INJECTION INTRAVENOUS
Status: COMPLETED | OUTPATIENT
Start: 2024-01-11 | End: 2024-01-11

## 2024-01-11 RX ADMIN — GADOTERATE MEGLUMINE 15 ML: 376.9 INJECTION INTRAVENOUS at 15:54:00

## 2024-02-13 ENCOUNTER — TELEPHONE (OUTPATIENT)
Dept: NEUROLOGY | Facility: CLINIC | Age: 69
End: 2024-02-13

## 2024-02-13 NOTE — TELEPHONE ENCOUNTER
Phone call to pt. Advised pt of Dr. Emanuel's message. Pt verbalized understanding and scheduled a follow up visit with DR. Emanuel.   Done.

## 2024-02-13 NOTE — TELEPHONE ENCOUNTER
Hello, could you let the patient know that I reviewed her MRI brain and MRV brain?  I see age-related and vascular related white matter changes in her brain which are probably from her diabetes, high blood pressure and high cholesterol.  Over time these types of vascular risk factors can cause changes in the brain that can lead to memory problems.  I do not see any signs of new strokes or bleeding or any other abnormalities.  There are no blood clots on her MRV brain.  The best way to treat this besides the medications that she is already on this is to exercise good control of her diabetes, high blood pressure and high cholesterol as she has been doing.  Thank you!

## 2024-02-16 DIAGNOSIS — R41.3 MEMORY PROBLEM: ICD-10-CM

## 2024-02-16 DIAGNOSIS — I63.81 LACUNAR STROKE (HCC): ICD-10-CM

## 2024-02-16 DIAGNOSIS — R51.9 CHRONIC DAILY HEADACHE: ICD-10-CM

## 2024-02-16 RX ORDER — ATORVASTATIN CALCIUM 10 MG/1
10 TABLET, FILM COATED ORAL EVERY 24 HOURS
Qty: 90 TABLET | Refills: 3 | Status: SHIPPED | OUTPATIENT
Start: 2024-02-16

## 2024-02-16 NOTE — TELEPHONE ENCOUNTER
Pt is requesting refill for the following medication        atorvastatin 10 MG Oral Tab, Take 1 tablet (10 mg total) by mouth daily., Disp: 90 tablet, Rfl: 3

## 2024-02-16 NOTE — TELEPHONE ENCOUNTER
Refill passed per [facility] protocol.    Requested Prescriptions   Pending Prescriptions Disp Refills    atorvastatin 10 MG Oral Tab 90 tablet 3     Sig: Take 1 tablet (10 mg total) by mouth daily.       Cholesterol Medication Protocol Passed - 2/16/2024 10:48 AM        Passed - ALT < 80     Lab Results   Component Value Date    ALT 21 04/11/2023             Passed - ALT resulted within past year        Passed - Lipid panel within past 12 months     Lab Results   Component Value Date    CHOLEST 146 10/07/2023    TRIG 62 10/07/2023    HDL 64 (H) 10/07/2023    LDL 69 10/07/2023    VLDL 9 10/07/2023    NONHDLC 82 10/07/2023             Passed - In person appointment or virtual visit in the past 12 mos or appointment in next 3 mos     Recent Outpatient Visits              3 months ago Lacunar stroke (HCC)    [facility] Riya Emanuel DO    Office Visit    4 months ago Vertigo    [facility], Keegan Butler MD    Office Visit    8 months ago [facility]    Office Visit    8 months ago Dysuria    [facility] Vikash Carbone APRN    Office Visit    11 months ago [facility]hematuria    [facility]    [facility] Visit          Future Appointments         Provider Department Appt Notes    In 3 weeks Riya Emanuel DO [facility] Follow up

## 2024-02-16 NOTE — TELEPHONE ENCOUNTER
Requested Prescriptions     Pending Prescriptions Disp Refills    MEMANTINE 5 MG Oral Tab [Pharmacy Med Name: MEMANTINE 5MG TABLETS] 90 tablet 0     Sig: TAKE 1 TABLET(5 MG) BY MOUTH DAILY        LOV: 11/15/23  NOV: 3/13/24    Last refill/[place]: 11/15/23

## 2024-02-19 RX ORDER — MEMANTINE HYDROCHLORIDE 5 MG/1
5 TABLET ORAL DAILY
Qty: 90 TABLET | Refills: 0 | Status: SHIPPED | OUTPATIENT
Start: 2024-02-19

## 2024-03-13 ENCOUNTER — OFFICE VISIT (OUTPATIENT)
Dept: NEUROLOGY | Facility: CLINIC | Age: 69
End: 2024-03-13
Payer: MEDICARE

## 2024-03-13 VITALS — BODY MASS INDEX: 32.79 KG/M2 | WEIGHT: 167 LBS | HEIGHT: 60 IN

## 2024-03-13 DIAGNOSIS — Z86.73 HISTORY OF STROKE: ICD-10-CM

## 2024-03-13 DIAGNOSIS — E53.8 VITAMIN B12 DEFICIENCY: ICD-10-CM

## 2024-03-13 DIAGNOSIS — M47.22 CERVICAL SPONDYLOSIS WITH RADICULOPATHY: Primary | ICD-10-CM

## 2024-03-13 DIAGNOSIS — G44.86 CERVICOGENIC HEADACHE: ICD-10-CM

## 2024-03-13 DIAGNOSIS — G31.84 MILD COGNITIVE IMPAIRMENT WITH MEMORY LOSS: ICD-10-CM

## 2024-03-13 PROCEDURE — 99214 OFFICE O/P EST MOD 30 MIN: CPT | Performed by: OTHER

## 2024-03-13 PROCEDURE — 1160F RVW MEDS BY RX/DR IN RCRD: CPT | Performed by: OTHER

## 2024-03-13 PROCEDURE — 1159F MED LIST DOCD IN RCRD: CPT | Performed by: OTHER

## 2024-03-13 PROCEDURE — 3008F BODY MASS INDEX DOCD: CPT | Performed by: OTHER

## 2024-03-13 RX ORDER — MELATONIN
1000 DAILY
Qty: 90 TABLET | Refills: 0 | Status: SHIPPED | OUTPATIENT
Start: 2024-03-13 | End: 2024-06-11

## 2024-03-13 RX ORDER — ASPIRIN 81 MG/1
81 TABLET, CHEWABLE ORAL DAILY
Qty: 90 TABLET | Refills: 3 | Status: SHIPPED | OUTPATIENT
Start: 2024-03-13 | End: 2025-03-08

## 2024-03-13 NOTE — PROGRESS NOTES
[facility]  [address], [address]  [facility] [address]  [phone]          Established  Follow Up Visit       Date: March 13, 2024  Patient Name: Page Pritchett   MRN: [id number]  Primary physician: [address] [facility]  [facility] [address]    Interval History:   --Since her stroke, she has had tingling, numbness and pain in her left upper extremity and hand.  Continuous.  However she also has neck pain that radiates down her left arm.  She has improved headaches, worse with hypertension.  She stopped Aspirin herself after running out herself and she was told her pharmacy would call for refill.  Memory has improved.    --Used  phone.       OUTPATIENT MEDICATIONS  Current Outpatient Medications on File Prior to Visit   Medication Sig Dispense Refill    memantine 5 MG Oral Tab Take 1 tablet (5 mg total) by mouth daily. 90 tablet 0    atorvastatin 10 MG Oral Tab Take 1 tablet (10 mg total) by mouth daily. 90 tablet 3    metFORMIN  MG Oral Tablet 24 Hr Take 1 tablet (750 mg total) by mouth daily. 90 tablet 0    ergocalciferol 1.25 MG (36993 UT) Oral Cap Take 1 capsule (50,000 Units total) by mouth once a week. 12 capsule 0    meclizine 12.5 MG Oral Tab Take 1 tablet (12.5 mg total) by mouth 3 (three) times daily as needed. 40 tablet 1    Empagliflozin (JARDIANCE) 25 MG Oral Tab Take 1 tablet by mouth daily. 90 tablet 1    Losartan Potassium-HCTZ 100-12.5 MG Oral Tab Take 1 tablet by mouth daily. 90 tablet 1    ONETOUCH ULTRA In Vitro Strip daily.      Lancets (ONETOUCH DELICA PLUS [id number]) Does not apply Misc 1 strip by In Vitro route daily.      alendronate 70 MG Oral Tab Take 1 tablet (70 mg total) by mouth every 7 days. 12 tablet 3     No current facility-administered medications on file prior to visit.         PHYSICAL EXAM:   Ht 60\"   Wt 167 lb (75.8 kg)   BMI 32.61 kg/m²   General appearance: Well appearing, and in no acute distress  Skin: skin color,  texture normal.  No rashes or lesions.    Head: Normocephalic, atraumatic.    Neurological exam:    Mental Status:   Attention/Concentration: intact attention on bedside test   Fund of knowledge: intact  Speech: no dysarthria or aphasia     Cranial Nerves:  Visual Fields: R and L visual fields full to confrontation  CN III, CN IV, CN VI (Extraocular movements): intact.    CN V:   intact sensation to light touch in all three divisions of the trigeminal nerves bilaterally.  CN VII: normal facial muscle strength bilaterally    Motor Exam:   Muscle Bulk: No atrophy or fasciculations   Involuntary movements: none    Strength:  Poor effort on motor exam      Deltoid  C5 Biceps  C5-6  Triceps  C7 Wrist extensors  C7-8 Wrist flexors  C7 Finger flexors  C6-8  Finger extensors   C8    R 5 5 5 5 5 5 5   L 5 5 5 5 5 5 5        Hip flexion (Iliopsoas) L2-4  Knee flexion  (Hamstrings)  L5-S1   Knee Extension (Quadriceps)  L3, L4  Ankle plantarflexion  S1 Ankle Dorsiflexion  Tib Anterior   L5   R 5 5 5 5 5   L 5 5 5 5 5       Sensory:   LT and PP reduced in LUE     Reflexes:   Reduced in L biceps, triceps and BrRad     Coordination:   Normal: Finger to nose: no ataxia or dysmetria     Gait:   Arises independently; normal posture; gait stable with normal stride length, rate, base and arm swing.     LABS/DATA:  Component      Latest Ref Rn 11/15/2023   Vit E Alpha Kenvil      9.0 - 29.0 mg/L 11.1    Vit E Gamma Kenvil      0.5 - 4.9 mg/L 2.3    AMMONIA      11 - 32 umol/L <10 (L)    COPPER      80 - 158 ug/dL 159 (H)    FOLATE (FOLIC ACID), SERUM      >5.4 ng/mL 23.9    METHYLMALONIC ACID      0 - 378 nmol/L 415 (H)    VITAMIN B1 (THIAMINE), WHOLE B      66.5 - 200.0 nmol/L 113.8    Vitamin B12      211 - 911 pg/mL 387    VITAMIN B6      3.4 - 65.2 ug/L 4.8    TSH      0.550 - 4.780 mIU/mL 0.796       Legend:  (L) Low  (H) High      IMAGING:  MRI brain  CONCLUSION:   1. No acute intracranial abnormality. Specifically, no evidence of  acute or early subacute infarction.   2. Stable mild-to-moderate nonspecific partially confluent white matter changes throughout both cerebral hemispheres, which likely relate to sequelae of chronic microangiopathy.   3. Stable right maxillary sinus retention cyst and small bilateral mastoid effusions.   4. Lesser incidental findings as above.     MRV head  CONCLUSION:      No dural venous sinus thrombosis.   I PERSONALLY REVIEWED THESE IMAGES.     ASSESSMENT:  The patient is a 68 year old woman with past medical history of hypertension, hyperlipidemia, generalized osteoarthritis, type 2 diabetes, right kidney angiomyolipoma, gastritis who presents for follow-up regarding suspected MRI negative stroke after developing left facial droop; also noted to have holocephalic severe headache.    -Outpatient 30-day cardiac monitoring due to dilated LA on echo - unremarkable   -MRI brain - no new changes, stable chronic microvascular ischemic change  -MRV head - no CVT  -Normal/negative vitamin E, ammonia, folic acid, vitamin B1, vitamin B6 and TSH  - Copper elevated very mildly at 159  - Vitamin B12 deficiency with elevated MMA  -EEG normal       Her neurological examination is significant for left facial droop.    Left arm paresthesias and pain worrisome for cervical radiculopathy   Cervicogenic headaches   -MRI cervical spine   -Spine medicine referral   -Headaches are improving, monitor for now, could consider Gabapentin if worsening      History of MRI-negative lacunar stroke from hypertension: presented as left facial droop, weakness and ataxia; the pattern of facial droop is not LMN so this is not Vincent Palsy.    -Continue Aspirin, statin - self discontinued Aspirin, recommended to resume    -Outpatient PT, OT and ST      Memory problem may be metabolic from vitamin B12 deficiency  -Vitamin B12 supplement   - Namenda  - No driving unless memory improves - discussed     Follow up: 6 months     Discussed indication,  administration, dose, and side effects with patient of any medications personally prescribed. Patient was advised to let my office know if they have any questions or concerns.       Today, I personally spent 17 minutes in this case, including chart review, time spent with patient doing face to face evaluation w/ interview and exam and patient education, counseling, and time was spent in patient education, counseling, and coordination of care as described above.   Issues discussed: Diagnosis and implications on future health, benefits and side effects of present and future medications, test results as well as further testing and medications required.    This note was prepared using Dragon Medical voice recognition dictation software and as a result, errors may occur. When identified, these errors have been corrected. While every attempt is made to correct errors during dictation, discrepancies may still exist    GARETH Emanuel DO   Staff Physician, Neurology   3/13/2024  12:44 PM

## 2024-04-05 ENCOUNTER — TELEPHONE (OUTPATIENT)
Dept: FAMILY MEDICINE CLINIC | Facility: CLINIC | Age: 69
End: 2024-04-05

## 2024-04-05 DIAGNOSIS — Z12.31 SCREENING MAMMOGRAM FOR BREAST CANCER: Primary | ICD-10-CM

## 2024-04-05 NOTE — TELEPHONE ENCOUNTER
Patient is scheduled for a Pre-Op 4/18 @ 10:45.     Patient will call Dentist to schedule an appointment for the surgery once she is cleared by Dr. Mena

## 2024-04-12 RX ORDER — EMPAGLIFLOZIN 25 MG/1
1 TABLET, FILM COATED ORAL DAILY
Qty: 90 TABLET | Refills: 1 | Status: SHIPPED | OUTPATIENT
Start: 2024-04-12

## 2024-04-12 RX ORDER — METFORMIN HYDROCHLORIDE 750 MG/1
750 TABLET, EXTENDED RELEASE ORAL DAILY
Qty: 90 TABLET | Refills: 1 | Status: SHIPPED | OUTPATIENT
Start: 2024-04-12

## 2024-04-12 NOTE — TELEPHONE ENCOUNTER
Please review; protocol failed.    Requested Prescriptions   Pending Prescriptions Disp Refills    JARDIANCE 25 MG Oral Tab [Pharmacy Med Name: JARDIANCE 25MG TABLETS] 90 tablet 1     Sig: TAKE 1 TABLET BY MOUTH DAILY       Diabetes Medication Protocol Failed - 4/11/2024 10:17 AM        Failed - Last A1C < 7.5 and within past 6 months     Lab Results   Component Value Date    A1C 6.7 (H) 10/07/2023             Failed - Microalbumin procedure in past 12 months or taking ACE/ARB        Passed - In person appointment or virtual visit in the past 6 mos or appointment in next 3 mos     Recent Outpatient Visits              1 month ago Cervical spondylosis with radiculopathy    [facility]    Office Visit    4 months ago Lacunar stroke (HCC)    [facility]    Office Visit    5 months ago Vertigo    [facility] Keegan Mena MD    Office Visit    10 months ago Microhematuria    [facility]    Office Visit    10 months ago Dysuria    [facility] Vikash Carbone APRN    Office Visit          Future Appointments         Provider Department Appt Notes    In 6 days Keegan Mena MD [facility] Pre-op    In 2 weeks Behar, Alex, MD [facility] Ref'd by Dr Emanuel                    Passed - EGFRCR or GFRNAA > 50     GFR Evaluation  EGFRCR: 88 , resulted on 10/7/2023          Passed - GFR in the past 12 months      Please review; protocol failed.      METFORMIN  MG Oral Tablet 24 Hr [Pharmacy Med Name: METFORMIN ER 750MG 24HR TABS] 90 tablet 0     Sig: TAKE 1 TABLET(750 MG) BY MOUTH DAILY       Diabetes Medication Protocol Failed - 4/11/2024 10:17 AM        Failed - Last A1C  < 7.5 and within past 6 months     Lab Results   Component Value Date    A1C 6.7 (H) 10/07/2023             Failed - Microalbumin procedure in past 12 months or taking ACE/ARB        Passed - In person appointment or virtual visit in the past 6 mos or appointment in next 3 mos     Recent Outpatient Visits              1 month ago Cervical [facility]ondylosis with radicul[facility]    [place]    4 months ago Lacunar stroke (HCC)    [facility] [place]    Office Visit    5 months ago Vertigo    [facility] Keegan Butler MD    Office Visit    10 months ago Microhematuria    [facility] Chandni Mccormick APRN    Office Visit    10 months ago Dysuria    [facility] [place] Vikash Carbone APRN    Office Visit          Future Appointments         Provider Department Appt Notes    In 6 [facility]    In 2 weeks Behar, Alex, MD [facility] Ref'd by Dr Emanuel                    Passed - EGFRCR or GFRNAA > 50     GFR Evaluation  EGFRCR: 88 , resulted on 10/7/2023          Passed - GFR in the past 12 months

## 2024-04-15 NOTE — TELEPHONE ENCOUNTER
Current Outpatient Medications:     Empagliflozin (JARDIANCE) 25 MG Oral Tab, Take 1 tablet by mouth daily., Disp: 90 tablet, Rfl: 1

## 2024-04-16 RX ORDER — EMPAGLIFLOZIN 25 MG/1
1 TABLET, FILM COATED ORAL DAILY
Qty: 90 TABLET | Refills: 1 | OUTPATIENT
Start: 2024-04-16

## 2024-04-18 ENCOUNTER — TELEPHONE (OUTPATIENT)
Dept: FAMILY MEDICINE CLINIC | Facility: CLINIC | Age: 69
End: 2024-04-18

## 2024-04-18 ENCOUNTER — OFFICE VISIT (OUTPATIENT)
Dept: FAMILY MEDICINE CLINIC | Facility: CLINIC | Age: 69
End: 2024-04-18

## 2024-04-18 ENCOUNTER — LAB ENCOUNTER (OUTPATIENT)
Dept: LAB | Age: 69
End: 2024-04-18
Attending: FAMILY MEDICINE
Payer: MEDICARE

## 2024-04-18 VITALS
WEIGHT: 171.63 LBS | BODY MASS INDEX: 33.7 KG/M2 | HEIGHT: 60 IN | DIASTOLIC BLOOD PRESSURE: 68 MMHG | SYSTOLIC BLOOD PRESSURE: 124 MMHG | HEART RATE: 64 BPM

## 2024-04-18 DIAGNOSIS — E11.9 TYPE 2 DIABETES MELLITUS WITHOUT COMPLICATION, WITHOUT LONG-TERM CURRENT USE OF INSULIN (HCC): ICD-10-CM

## 2024-04-18 DIAGNOSIS — Z01.818 PREOPERATIVE CLEARANCE: Primary | ICD-10-CM

## 2024-04-18 DIAGNOSIS — K02.9 DENTAL CARIES: ICD-10-CM

## 2024-04-18 DIAGNOSIS — Z01.818 PREOPERATIVE CLEARANCE: ICD-10-CM

## 2024-04-18 DIAGNOSIS — Z12.31 ENCOUNTER FOR SCREENING MAMMOGRAM FOR MALIGNANT NEOPLASM OF BREAST: ICD-10-CM

## 2024-04-18 LAB
ALBUMIN SERPL-MCNC: 4.1 G/DL (ref 3.2–4.8)
ALBUMIN/GLOB SERPL: 1.3 {RATIO} (ref 1–2)
ALP LIVER SERPL-CCNC: 88 U/L
ALT SERPL-CCNC: 10 U/L
ANION GAP SERPL CALC-SCNC: 6 MMOL/L (ref 0–18)
AST SERPL-CCNC: 19 U/L (ref ?–34)
BASOPHILS # BLD AUTO: 0.04 X10(3) UL (ref 0–0.2)
BASOPHILS NFR BLD AUTO: 0.7 %
BILIRUB SERPL-MCNC: 0.7 MG/DL (ref 0.2–1.1)
BUN BLD-MCNC: 12 MG/DL (ref 9–23)
BUN/CREAT SERPL: 17.6 (ref 10–20)
CALCIUM BLD-MCNC: 9.5 MG/DL (ref 8.7–10.4)
CARTRIDGE LOT#: ABNORMAL NUMERIC
CHLORIDE SERPL-SCNC: 110 MMOL/L (ref 98–112)
CO2 SERPL-SCNC: 27 MMOL/L (ref 21–32)
CREAT BLD-MCNC: 0.68 MG/DL
CREAT UR-SCNC: 29.8 MG/DL
DEPRECATED RDW RBC AUTO: 42.8 FL (ref 35.1–46.3)
EGFRCR SERPLBLD CKD-EPI 2021: 94 ML/MIN/1.73M2 (ref 60–?)
EOSINOPHIL # BLD AUTO: 0.18 X10(3) UL (ref 0–0.7)
EOSINOPHIL NFR BLD AUTO: 3 %
ERYTHROCYTE [DISTWIDTH] IN BLOOD BY AUTOMATED COUNT: 13.1 % (ref 11–15)
FASTING STATUS PATIENT QL REPORTED: YES
GLOBULIN PLAS-MCNC: 3.1 G/DL (ref 2.8–4.4)
GLUCOSE BLD-MCNC: 109 MG/DL (ref 70–99)
HCT VFR BLD AUTO: 40.2 %
HEMOGLOBIN A1C: 6.6 % (ref 4.3–5.6)
HGB BLD-MCNC: 12.7 G/DL
IMM GRANULOCYTES # BLD AUTO: 0.02 X10(3) UL (ref 0–1)
IMM GRANULOCYTES NFR BLD: 0.3 %
LYMPHOCYTES # BLD AUTO: 2.53 X10(3) UL (ref 1–4)
LYMPHOCYTES NFR BLD AUTO: 42.7 %
MCH RBC QN AUTO: 28.3 PG (ref 26–34)
MCHC RBC AUTO-ENTMCNC: 31.6 G/DL (ref 31–37)
MCV RBC AUTO: 89.5 FL
MICROALBUMIN UR-MCNC: <0.3 MG/DL
MONOCYTES # BLD AUTO: 0.5 X10(3) UL (ref 0.1–1)
MONOCYTES NFR BLD AUTO: 8.4 %
NEUTROPHILS # BLD AUTO: 2.66 X10 (3) UL (ref 1.5–7.7)
NEUTROPHILS # BLD AUTO: 2.66 X10(3) UL (ref 1.5–7.7)
NEUTROPHILS NFR BLD AUTO: 44.9 %
OSMOLALITY SERPL CALC.SUM OF ELEC: 296 MOSM/KG (ref 275–295)
PLATELET # BLD AUTO: 251 10(3)UL (ref 150–450)
POTASSIUM SERPL-SCNC: 4 MMOL/L (ref 3.5–5.1)
PROT SERPL-MCNC: 7.2 G/DL (ref 5.7–8.2)
RBC # BLD AUTO: 4.49 X10(6)UL
SODIUM SERPL-SCNC: 143 MMOL/L (ref 136–145)
WBC # BLD AUTO: 5.9 X10(3) UL (ref 4–11)

## 2024-04-18 PROCEDURE — 36415 COLL VENOUS BLD VENIPUNCTURE: CPT

## 2024-04-18 PROCEDURE — 82043 UR ALBUMIN QUANTITATIVE: CPT

## 2024-04-18 PROCEDURE — 99214 OFFICE O/P EST MOD 30 MIN: CPT | Performed by: FAMILY MEDICINE

## 2024-04-18 PROCEDURE — 85025 COMPLETE CBC W/AUTO DIFF WBC: CPT

## 2024-04-18 PROCEDURE — 3008F BODY MASS INDEX DOCD: CPT | Performed by: FAMILY MEDICINE

## 2024-04-18 PROCEDURE — 80053 COMPREHEN METABOLIC PANEL: CPT

## 2024-04-18 PROCEDURE — 1126F AMNT PAIN NOTED NONE PRSNT: CPT | Performed by: FAMILY MEDICINE

## 2024-04-18 PROCEDURE — 3078F DIAST BP <80 MM HG: CPT | Performed by: FAMILY MEDICINE

## 2024-04-18 PROCEDURE — 3074F SYST BP LT 130 MM HG: CPT | Performed by: FAMILY MEDICINE

## 2024-04-18 PROCEDURE — 1159F MED LIST DOCD IN RCRD: CPT | Performed by: FAMILY MEDICINE

## 2024-04-18 PROCEDURE — 83036 HEMOGLOBIN GLYCOSYLATED A1C: CPT | Performed by: FAMILY MEDICINE

## 2024-04-18 PROCEDURE — 82570 ASSAY OF URINE CREATININE: CPT

## 2024-04-18 NOTE — PROGRESS NOTES
2024  10:37 AM    Page Pritchett is a 68 year old female.    Chief complaint(s):   Chief Complaint   Patient presents with    Pre-Op Exam     Dental Clearance [facility]     HPI:     Page Pritchett primary complaint is regarding preop clearance.     Patient is a 68-year-old female with long history of diabetes, hypertension, osteoporosis who is here for preop clearance.  Patient is tentatively scheduled to undergo multiple dental procedures in the next 2 weeks.  She denies any adverse events from anesthesia in the past.  No recent chest pain shortness of breath or palpitations.  Denies any fever or URI symptoms.  Patient was instructed to stop aspirin 5 days before the procedure and to stop alendronate 10 days before the procedure.  Had a normal stress test .    Revised Cardiac Risk Index (RCRI)     History of ischemic heart disease  History of congestive heart failure  History of cerebrovascular disease (including transient ischemic attacks)   History of diabetes requiring preoperative insulin use  Chronic kidney disease with creatinine greater than 2 mg/dL  A planned procedure that is by definition a high-risk surgery    Total score:  Zero      HISTORY:  Past Medical History:    Angiomyolipoma of right kidney    Chronic low back pain    Diabetes (HCC)    Esophageal reflux    Gastritis    High blood pressure    High cholesterol    Osteoarthritis    Psoriasis    Stroke (HCC)    \"small stroke\" many years ago    Visual impairment    readers      Past Surgical History:   Procedure Laterality Date          Cholecystectomy      Spine surgery procedure unlisted      Tonsillectomy      Total knee replacement Right     no associated bleeding complications      Family History   Problem Relation Age of Onset    Diabetes Mother         Diabetes Type 2    Other (Other) Mother     Prostate Cancer Father     Other (Other) Sister         Gallbladder disease    Hypertension Other          Family h/o    Other (Other) Other         Family h    Bleeding Disorders Neg     DVT/VTE Neg     Breast Cancer Neg     Ovarian Cancer Neg       Social History:   Social History     Socioeconomic History    Marital status:    Tobacco Use    Smoking status: Light Smoker     Current packs/day: 0.25     Average packs/day: 0.3 packs/day for 47.0 years (11.8 ttl pk-yrs)     Types: Cigarettes    Smokeless tobacco: Never   Vaping Use    Vaping status: Never Used   Substance and Sexual Activity    Alcohol use: No     Alcohol/week: 0.0 standard drinks of alcohol    Drug use: Never   Other Topics Concern    Caffeine Concern Yes     Comment: Soda daily    Exercise No   Social History Narrative    \"Dulia\" is windowed but has a long term boyfriend for the last 4 yrs. Mother of 2 adult children with 1 granddaughter. She formerly worked from [employer] in [place], [place]. Patient lives alone in [place], [place].            The patient does not use an assistive device..      The patient does live in a home with stairs.     Social Determinants of Health     Food Insecurity: No Food Insecurity (10/6/2023)    Food Insecurity     Food Insecurity: Never true   Transportation Needs: No Transportation Needs (10/6/2023)    Transportation Needs     Lack of Transportation: No   Housing Stability: Low Risk  (10/6/2023)    Housing Stability     Housing Instability: No        Immunizations:   Immunization History   Administered Date(s) Administered    Covid-19 Vaccine Pfizer 30 mcg/0.3 ml 02/27/2021, 03/20/2021    Covid-19 Vaccine Pfizer Naresh-Sucrose 30 mcg/0.3 ml 01/29/2022    FLU VAC High Dose 65 YRS & Older PRSV Free (70144) 10/05/2020, 01/18/2022, 09/06/2022, 10/08/2023    Fluzone Vaccine Medicare () 10/05/2020    Pneumococcal Conjugate PCV20 05/19/2022    Zoster Vaccine Recombinant Adjuvanted (Shingrix) 05/19/2022, 07/20/2022       Medications (Active prior to today's visit):  Current Outpatient Medications    Medication Sig Dispense Refill    Empagliflozin (JARDIANCE) 25 MG Oral Tab Take 1 tablet by mouth daily. 90 tablet 1    metFORMIN  MG Oral Tablet 24 Hr Take 1 tablet (750 mg total) by mouth daily. 90 tablet 1    aspirin (ASPIRIN 81) 81 MG Oral Chew Tab Chew 1 tablet (81 mg total) by mouth daily. 90 tablet 3    cyanocobalamin 1000 MCG Oral Tab Take 1 tablet (1,000 mcg total) by mouth daily. 90 tablet 0    memantine 5 MG Oral Tab Take 1 tablet (5 mg total) by mouth daily. 90 tablet 0    atorvastatin 10 MG Oral Tab Take 1 tablet (10 mg total) by mouth daily. 90 tablet 3    ergocalciferol 1.25 MG (38300 UT) Oral Cap Take 1 capsule (50,000 Units total) by mouth once a week. 12 capsule 0    meclizine 12.5 MG Oral Tab Take 1 tablet (12.5 mg total) by mouth 3 (three) times daily as needed. 40 tablet 1    Losartan Potassium-HCTZ 100-12.5 MG Oral Tab Take 1 tablet by mouth daily. 90 tablet 1    ONETOUCH ULTRA In Vitro Strip daily.      Lancets (ONETOUCH DELICA PLUS [id number]) Does not apply Misc 1 strip by In Vitro route daily.      alendronate 70 MG Oral Tab Take 1 tablet (70 mg total) by mouth every 7 days. 12 tablet 3       Allergies:  Allergies   Allergen Reactions    Lisinopril SWELLING and TONGUE SWELLING    Codeine ANXIETY     Caused pimples    Norco [Hydrocodone-Acetaminophen] ANXIETY         ROS:   Review of Systems   Constitutional:  Negative for appetite change and fever.   HENT:          Teeth disease   Eyes:  Negative for visual disturbance.   Respiratory:  Negative for shortness of breath.    Cardiovascular:  Negative for chest pain.   Gastrointestinal:  Negative for abdominal pain, nausea and vomiting.   Endocrine: Negative for polydipsia and polyuria.   Musculoskeletal:  Negative for back pain.   Skin:  Negative for rash.   Neurological:  Negative for dizziness and headaches.       PHYSICAL EXAM:   VS: /68   Pulse 64   Ht 5' (1.524 m)   Wt 171 lb 9.6 oz (77.8 kg)   BMI 33.51 kg/m²      Physical Exam  Vitals reviewed.   Constitutional:       General: She is not in acute distress.     Appearance: Normal appearance.   HENT:      Head: Normocephalic.   Eyes:      Conjunctiva/sclera: Conjunctivae normal.   Cardiovascular:      Rate and Rhythm: Normal rate and regular rhythm.      Heart sounds: Normal heart sounds.   Pulmonary:      Effort: Pulmonary effort is normal.      Breath sounds: Normal breath sounds.   Musculoskeletal:      Cervical back: Neck supple.   Skin:     Findings: No rash.   Psychiatric:         Mood and Affect: Mood normal.         LABORATORY RESULTS:   No results found for: \"URCOLOR\", \"URCLA\", \"URINELEUK\", \"URINENITRITE\", \"URINEBLOOD\"   Results for orders placed or performed in visit on 04/18/24   POC Glycohemoglobin [43977]   Result Value Ref Range    HEMOGLOBIN A1C 6.6 (A) 4.3 - 5.6 %    Cartridge Lot# [id number] Numeric    Cartridge Expiration Date 1/31/26 Date       EKG / Spirometry : -     Radiology: No results found.     ASSESSMENT/PLAN:   Assessment   Encounter Diagnoses   Name Primary?    Preoperative clearance Yes    Dental caries     Type 2 diabetes mellitus without complication, without long-term current use of insulin ([facility])     Encounter for screening mammogram for malignant neoplasm of breast      Well control diabetes    MEDICATIONS:    Empagliflozin (JARDIANCE) 25 MG Oral Tab, Take 1 tablet by mouth daily., Disp: 90 tablet, Rfl: 1    metFORMIN  MG Oral Tablet 24 Hr, Take 1 tablet (750 mg total) by mouth daily., Disp: 90 tablet, Rfl: 1    aspirin (ASPIRIN 81) 81 MG Oral Chew Tab, Chew 1 tablet (81 mg total) by mouth daily., Disp: 90 tablet, Rfl: 3    cyanocobalamin 1000 MCG Oral Tab, Take 1 tablet (1,000 mcg total) by mouth daily., Disp: 90 tablet, Rfl: 0    memantine 5 MG Oral Tab, Take 1 tablet (5 mg total) by mouth daily., Disp: 90 tablet, Rfl: 0    atorvastatin 10 MG Oral Tab, Take 1 tablet (10 mg total) by mouth daily., Disp: 90 tablet, Rfl: 3     ergocalciferol 1.25 MG (64948 UT) Oral Cap, Take 1 capsule (50,000 Units total) by mouth once a week., Disp: 12 capsule, Rfl: 0    meclizine 12.5 MG Oral Tab, Take 1 tablet (12.5 mg total) by mouth 3 (three) times daily as needed., Disp: 40 tablet, Rfl: 1    Losartan Potassium-HCTZ 100-12.5 MG Oral Tab, Take 1 tablet by mouth daily., Disp: 90 tablet, Rfl: 1    ONETOUCH ULTRA In Vitro Strip, daily., Disp: , Rfl:     Lancets (ONETOUCH DELICA PLUS [id number]) Does not apply Misc, 1 strip by In Vitro route daily., Disp: , Rfl:     alendronate 70 MG Oral Tab, Take 1 tablet (70 mg total) by mouth every 7 days., Disp: 12 tablet, Rfl: 3         LABORATORY & ORDERS:   Orders Placed This Encounter   Procedures    POC Glycohemoglobin [76083]    Microalb/Creat Ratio, Random Urine    CBC With Differential With Platelet    Comp Metabolic Panel (14)        RECOMMENDATIONS given include: Patient was reassured of  her medical condition and all questions and concerns were answered. Patient was informed to please, call our office with any new or further questions or concerns that may come up in the near future. Notify Dr Watts or the [facility] if there is a deterioration or worsening of the medical condition. Also, inform the doctor with any new symptoms or medications' side effects.      FOLLOW-UP: Schedule a follow-up visit in  6 months.      ADDENDUM 04/19/2024: Normal labs.  Patient is clear for surgery.           Orders This Visit:  Orders Placed This Encounter   Procedures    POC Glycohemoglobin [[id number]]    Microalb/Creat Ratio, Random Urine    CBC With Differential With Platelet    Comp Metabolic Panel (14)       Meds This Visit:  Requested Prescriptions      No prescriptions requested or ordered in this encounter       Imaging & Referrals:  None         TRE WATTS MD

## 2024-04-18 NOTE — TELEPHONE ENCOUNTER
Spoke to [facility] per Patient Form faxed from them to us 3 weeks ago, I clarified if there was a form, [facility] states no form just need of clearance note. Fax number given to us is [phone].

## 2024-04-19 NOTE — TELEPHONE ENCOUNTER
Pt called and was inform of normal labs per result note.  Pt wanted to know if she is clear for her procedure. A clearance letter needs to be sent to her Dentist  office Fax number given to us is [phone].  is pt clear for her procedure ?  Please let pt know once the clearance letter has been faxed so she can make her appt.      pt stated that you were going to order her a mammogram but [facility]. Mammogram has been ordered for pt.      Pt aware Dr. Mena is not in the office today. Pt stated that it was ok.

## 2024-04-22 NOTE — TELEPHONE ENCOUNTER
Pt is requesting refill for the following medication        Empagliflozin (JARDIANCE) 25 MG Oral Tab, Take 1 tablet by mouth daily., Disp: 90 tablet, Rfl: 1

## 2024-04-24 RX ORDER — EMPAGLIFLOZIN 25 MG/1
1 TABLET, FILM COATED ORAL DAILY
Qty: 90 TABLET | Refills: 1 | OUTPATIENT
Start: 2024-04-24

## 2024-04-29 ENCOUNTER — HOSPITAL ENCOUNTER (OUTPATIENT)
Dept: GENERAL RADIOLOGY | Facility: HOSPITAL | Age: 69
Discharge: HOME | End: 2024-04-29
Attending: PHYSICAL MEDICINE & REHABILITATION
Payer: MEDICARE

## 2024-04-29 ENCOUNTER — OFFICE VISIT (OUTPATIENT)
Dept: PHYSICAL MEDICINE AND REHAB | Facility: CLINIC | Age: 69
End: 2024-04-29
Payer: MEDICARE

## 2024-04-29 VITALS
HEART RATE: 65 BPM | OXYGEN SATURATION: 98 % | SYSTOLIC BLOOD PRESSURE: 124 MMHG | BODY MASS INDEX: 29.19 KG/M2 | WEIGHT: 171 LBS | HEIGHT: 64 IN | DIASTOLIC BLOOD PRESSURE: 70 MMHG

## 2024-04-29 DIAGNOSIS — M47.816 FACET SYNDROME, LUMBAR: ICD-10-CM

## 2024-04-29 DIAGNOSIS — M47.812 CERVICAL SPONDYLOSIS: ICD-10-CM

## 2024-04-29 DIAGNOSIS — M48.061 LUMBAR FORAMINAL STENOSIS: ICD-10-CM

## 2024-04-29 DIAGNOSIS — M47.816 LUMBAR SPONDYLOSIS: ICD-10-CM

## 2024-04-29 DIAGNOSIS — M47.812 CERVICAL FACET SYNDROME: ICD-10-CM

## 2024-04-29 DIAGNOSIS — M51.37 DDD (DEGENERATIVE DISC DISEASE), LUMBOSACRAL: ICD-10-CM

## 2024-04-29 DIAGNOSIS — M54.59 MECHANICAL LOW BACK PAIN: ICD-10-CM

## 2024-04-29 DIAGNOSIS — M54.16 LUMBAR RADICULOPATHY: ICD-10-CM

## 2024-04-29 DIAGNOSIS — M50.30 DDD (DEGENERATIVE DISC DISEASE), CERVICAL: ICD-10-CM

## 2024-04-29 DIAGNOSIS — M54.2 TRIGGER POINT OF NECK: ICD-10-CM

## 2024-04-29 DIAGNOSIS — M51.36 BULGE OF LUMBAR DISC WITHOUT MYELOPATHY: ICD-10-CM

## 2024-04-29 DIAGNOSIS — M47.816 FACET SYNDROME, LUMBAR: Primary | ICD-10-CM

## 2024-04-29 PROCEDURE — 72050 X-RAY EXAM NECK SPINE 4/5VWS: CPT | Performed by: PHYSICAL MEDICINE & REHABILITATION

## 2024-04-29 PROCEDURE — 72110 X-RAY EXAM L-2 SPINE 4/>VWS: CPT | Performed by: PHYSICAL MEDICINE & REHABILITATION

## 2024-04-29 NOTE — PROGRESS NOTES
[facility]  Progress Note    CHIEF COMPLAINT:    Chief Complaint   Patient presents with    Follow - Up     Patient presents for low back pain. Patient was seen in  for her low back and had an injection no relief. No recent injury. Denies N/T. Denies weakness. Denies radiating pain. Takes Tylenol for pain. Pain 8/10. NO recent imaging.        History of Present Illness:  The patient is a 68 year old right-handed female with significant past medical history of diabetes, GERD, hypertension, hyperlipidemia who presents for follow-up of her bilateral low back pain along with left lateral neck pain.  She denies any radicular symptoms into the upper extremities or lower extremities.  She rates her pain an 8 out of 10 in both locations.  She had last seen me back in  where we performed a left L5 and left S1 transforaminal epidural steroid injection.  Her symptoms of her neck and low back pain restarted about a week ago without an inciting event.  She has been taking Tylenol for pain.  No recent imaging has been performed.  She denies any focal paresthesias or weakness in the upper or lower extremities.    PAST MEDICAL HISTORY:  Past Medical History:    Angiomyolipoma of right kidney    Chronic low back pain    Diabetes (HCC)    Esophageal reflux    Gastritis    High blood pressure    High cholesterol    Osteoarthritis    Psoriasis    Stroke (HCC)    \"small stroke\" many years ago    Visual impairment    readers       SURGICAL HISTORY:  Past Surgical History:   Procedure Laterality Date          Cholecystectomy      Spine surgery procedure unlisted      Tonsillectomy      Total knee replacement Right 2020    no associated bleeding complications       SOCIAL HISTORY:   Social History     Occupational History    Not on file   Tobacco Use    Smoking status: Light Smoker     Current packs/day: 0.25     Average packs/day: 0.3 packs/day for 47.0 years (11.8  ttl pk-yrs)     Types: Cigarettes    Smokeless tobacco: Never   Vaping Use    Vaping status: Never Used   Substance and Sexual Activity    Alcohol use: No     Alcohol/week: 0.0 standard drinks of alcohol    Drug use: Never    Sexual activity: Not on file       FAMILY HISTORY:   Family History   Problem Relation Age of Onset    Diabetes Mother         Diabetes Type 2    Other (Other) Mother     Prostate Cancer Father     Other (Other) Sister         Gallbladder disease    Hypertension Other         Family h/o    Other (Other) Other         Family h    Bleeding Disorders Neg     DVT/VTE Neg     Breast Cancer Neg     Ovarian Cancer Neg        CURRENT MEDICATIONS:   Current Outpatient Medications   Medication Sig Dispense Refill    Empagliflozin (JARDIANCE) 25 MG Oral Tab Take 1 tablet by mouth daily. 90 tablet 1    metFORMIN  MG Oral Tablet 24 Hr Take 1 tablet (750 mg total) by mouth daily. 90 tablet 1    aspirin (ASPIRIN 81) 81 MG Oral Chew Tab Chew 1 tablet (81 mg total) by mouth daily. 90 tablet 3    cyanocobalamin 1000 MCG Oral Tab Take 1 tablet (1,000 mcg total) by mouth daily. 90 tablet 0    memantine 5 MG Oral Tab Take 1 tablet (5 mg total) by mouth daily. 90 tablet 0    atorvastatin 10 MG Oral Tab Take 1 tablet (10 mg total) by mouth daily. 90 tablet 3    ergocalciferol 1.25 MG (29202 UT) Oral Cap Take 1 capsule (50,000 Units total) by mouth once a week. 12 capsule 0    meclizine 12.5 MG Oral Tab Take 1 tablet (12.5 mg total) by mouth 3 (three) times daily as needed. 40 tablet 1    Losartan Potassium-HCTZ 100-12.5 MG Oral Tab Take 1 tablet by mouth daily. 90 tablet 1    ONETOUCH ULTRA In Vitro Strip daily.      Lancets (ONETOUCH DELICA PLUS [id number]) Does not apply Misc 1 strip by In Vitro route daily.      alendronate 70 MG Oral Tab Take 1 tablet (70 mg total) by mouth every 7 days. 12 tablet 3       ALLERGIES:   Allergies   Allergen Reactions    Lisinopril SWELLING and TONGUE SWELLING    Codeine  ANXIETY     Caused pimples    Norco [Hydrocodone-Acetaminophen] ANXIETY       REVIEW OF SYSTEMS:   Review of Systems   Constitutional: Negative.    HENT: Negative.    Eyes: Negative.    Respiratory: Negative.    Cardiovascular: Negative.    Gastrointestinal: Negative.    Genitourinary: Negative.    Musculoskeletal: As per HPI  Skin: Negative.    Neurological: As per HPI  Endo/Heme/Allergies: Negative.    Psychiatric/Behavioral: Negative.      All other systems reviewed and are negative. Pertinent positives and negatives noted in the HPI.        PHYSICAL EXAM:   /70   Pulse 65   Ht 64\"   Wt 171 lb (77.6 kg)   SpO2 98%   BMI 29.35 kg/m²     Body mass index is 29.35 kg/m².      General: No immediate distress  Head: Normocephalic/ Atraumatic  Eyes: Extra-occular movements intact.   Ears: No auricular hematoma or deformities  Mouth: No lesions or ulcerations  Heart: peripheral pulses intact. Normal capillary refill.   Lungs: Non-labored respirations  Abdomen: No abdominal guarding  Extremities: No lower extremity edema bilaterally   Skin: No lesions noted.   Cognition: alert & oriented x 3, attentive, able to follow 2 step commands, comprehention intact, spontaneous speech intact  Motor:    Musculoskeletal:        Data  [facility] Lab Encounter on 04/18/2024   Component Date Value Ref Range Status    Microalbumin, Urine 04/18/2024 <0.30  mg/dL Final    Creatinine Ur Random 04/18/2024 29.80  mg/dL Final    Malb/Cre Calc 04/18/2024    Final    Glucose 04/18/2024 109 (H)  70 - 99 mg/dL Final    Sodium 04/18/2024 143  136 - 145 mmol/L Final    Potassium 04/18/2024 4.0  3.5 - 5.1 mmol/L Final    Chloride 04/18/2024 110  98 - 112 mmol/L Final    CO2 04/18/2024 27.0  21.0 - 32.0 mmol/L Final    Anion Gap 04/18/2024 6  0 - 18 mmol/L Final    BUN 04/18/2024 12  9 - 23 mg/dL Final    Creatinine 04/18/2024 0.68  0.55 - 1.02 mg/dL Final    BUN/CREA Ratio 04/18/2024 17.6  10.0 - 20.0 Final    Calcium, Total 04/18/2024 9.5  8.7 -  10.4 mg/dL Final    Calculated Osmolality 04/18/2024 296 (H)  275 - 295 mOsm/kg Final    eGFR-Cr 04/18/2024 94  >=60 mL/min/1.73m2 Final    ALT 04/18/2024 10  10 - 49 U/L Final    AST 04/18/2024 19  <=34 U/L Final    Alkaline Phosphatase 04/18/2024 88  55 - 142 U/L Final    Bilirubin, Total 04/18/2024 0.7  0.2 - 1.1 mg/dL Final    Total Protein 04/18/2024 7.2  5.7 - 8.2 g/dL Final    Albumin 04/18/2024 4.1  3.2 - 4.8 g/dL Final    Globulin  04/18/2024 3.1  2.8 - 4.4 g/dL Final    A/G Ratio 04/18/2024 1.3  1.0 - 2.0 Final    Patient Fasting for CMP? 04/18/2024 Yes   Final    WBC 04/18/2024 5.9  4.0 - 11.0 x10(3) uL Final    RBC 04/18/2024 4.49  3.80 - 5.30 x10(6)uL Final    HGB 04/18/2024 12.7  12.0 - 16.0 g/dL Final    HCT 04/18/2024 40.2  35.0 - 48.0 % Final    MCV 04/18/2024 89.5  80.0 - 100.0 fL Final    MCH 04/18/2024 28.3  26.0 - 34.0 pg Final    MCHC 04/18/2024 31.6  31.0 - 37.0 g/dL Final    RDW-SD 04/18/2024 42.8  35.1 - 46.3 fL Final    RDW 04/18/2024 13.1  11.0 - 15.0 % Final    PLT 04/18/2024 251.0  150.0 - 450.0 10(3)uL Final    Neutrophil Absolute Prelim 04/18/2024 2.66  1.50 - 7.70 x10 (3) uL Final    Neutrophil Absolute 04/18/2024 2.66  1.50 - 7.70 x10(3) uL Final    Lymphocyte Absolute 04/18/2024 2.53  1.00 - 4.00 x10(3) uL Final    Monocyte Absolute 04/18/2024 0.50  0.10 - 1.00 x10(3) uL Final    Eosinophil Absolute 04/18/2024 0.18  0.00 - 0.70 x10(3) uL Final    Basophil Absolute 04/18/2024 0.04  0.00 - 0.20 x10(3) uL Final    Immature Granulocyte Absolute 04/18/2024 0.02  0.00 - 1.00 x10(3) uL Final    Neutrophil % 04/18/2024 44.9  % Final    Lymphocyte % 04/18/2024 42.7  % Final    Monocyte % 04/18/2024 8.4  % Final    Eosinophil % 04/18/2024 3.0  % Final    Basophil % 04/18/2024 0.7  % Final    Immature Granulocyte % 04/18/2024 0.3  % Final   Office Visit on 04/18/2024   Component Date Value Ref Range Status    HEMOGLOBIN A1C 04/18/2024 6.6 (A)  4.3 - 5.6 % Final    Cartridge Lot# 04/18/2024  [id number]  Numeric Final    Cartridge Expiration Date 04/18/2024 1/31/26  Date Final   [facility] Lab Encounter on 11/15/2023   Component Date Value Ref Range Status    Ammonia 11/15/2023 <10 (L)  11 - 32 umol/L Final    Copper 11/15/2023 159 (H)  80 - 158 ug/dL Final    Folate (Folic Acid) 11/15/2023 23.9  >5.4 ng/mL Final    Methylmalonic Acid 11/15/2023 415 (H)  0 - 378 nmol/L Final    Vitamin B1 Whole Bld 11/15/2023 113.8  66.5 - 200.0 nmol/L Final    Vitamin B12 11/15/2023 387  211 - 911 pg/mL Final    Vitamin B6 11/15/2023 4.8  3.4 - 65.2 ug/L Final    Vit E Alpha Veazie 11/15/2023 11.1  9.0 - 29.0 mg/L Final    Vit E Gamma Veazie 11/15/2023 2.3  0.5 - 4.9 mg/L Final    TSH 11/15/2023 0.796  0.550 - 4.780 mIU/mL Final   ]      Radiology Imaging:  I reviewed with the patient her MRI of the lumbar spine from 10/14/2021  PROCEDURE: MRI SPINE LUMBAR (CPT=72148)     COMPARISON: [facility], CT ABDOMEN PELVIS IV CONTRAST NO ORAL (ER), 6/06/2021, 11:53 AM.  [facility], XR LUMBAR SPINE AP LAT FLEX EXT (CPT=72110), 7/28/2021, 2:57 PM.     INDICATIONS: Chronic lower back pain with lumbar radiculopathy radiating to the left lower leg.     TECHNIQUE: A variety of imaging planes and parameters were utilized for visualization of suspected pathology.     FINDINGS:  NUMERATION: For the purposes of this examination, the lowest fully formed disc space is designated as L5-S1.  ALIGNMENT: There is interruption of the expected lumbar lordosis by trace retrolisthesis of L3 on L4 and slight anterolistheses of L4 on L5 and L5 on S1.  BONES: A subacute fracture involving the superior endplate of T12 is evident with approximately 35-40 % loss of vertebral body height. There is marrow edema throughout the vertebral segment with slight retropulsion. No suspicious osseous lesion is  evident. Multilevel anterior osteophyte formation is noted. Slight reciprocal endplate signal abnormalities are likely degenerative in  etiology.  CORD/CAUDA EQUINA: The conus medullaris terminates at the level of the thoracolumbar junction. The distal cord and nerve roots have normal caliber, contour, and signal intensity.  PARASPINAL AREA: No visible mass.  Mild superficial paraspinal subcutaneous edema is observed.  OTHER:   There is a fat intensity lesion at the upper pole of the right kidney measuring up to 3.7 x 3.9 cm maximally (series 1, image 14). There is a minute fat-containing umbilical hernia.     LUMBAR DISC LEVELS:  L1-L2: There is mild disc degeneration with left worse than right hypertrophic facet arthrosis and associated buckling of the ligamentum flavum. No significant spinal canal or foraminal impingement results.  L2-L3: A moderate, diffuse disc bulge is seen with superimposed broad-based bilateral foraminal protrusions. Hypertrophic facet arthrosis is seen with slight buckling of the ligamentum flavum. There is mild prominence of dorsal epidural fat. These  findings contribute to borderline spinal canal narrowing without significant foraminal impingement.  L3-L4: A moderate, diffuse disc bulge is seen with superimposed broad-based bilateral foraminal protrusions. There is hypertrophic facet arthrosis with buckling of the ligamentum flavum. Additional prominence of dorsal epidural fat is seen. These  findings cause moderate spinal canal stenosis with right greater than left subarticular zone encroachment of the descending L4 nerve roots and mild right foraminal stenosis of the exiting L3 nerve root.  L4-L5: A large, diffuse disc bulge is seen with superimposed broad-based bilateral foraminal protrusions. Hypertrophic facet arthrosis is manifested with associated buckling of the ligamentum flavum. These findings contribute to moderate spinal canal  stenosis with bilateral subarticular zone contact of the descending L5 nerve roots.  L5-S1: A moderate to large, diffuse disc bulge is seen superimposed broad-based left greater than  right foraminal and far lateral disc protrusions. Hypertrophic facet arthrosis is seen, worse on the left side than right. These findings contribute to  bilateral subarticular zone encroachment of the descending S1 nerve roots and asymmetric moderate foraminal narrowing of the exiting left L5 nerve root                  Impression   CONCLUSION:  1. There is a subacute fracture of the T12 vertebral segment with approximately 35-40% loss of vertebral body height and minimal retropulsion.     2. Multilevel degenerative changes of the lumbar spine are appreciated with moderate spinal canal stenosis at L3-L4 and L4-L5.     3. Additional subarticular zone and foraminal stenoses as detailed above.     4. Partially delineated lesion at the upper pole of the right kidney with fat intensity characteristics, suggestive of angiomyolipoma.     5. Lesser incidental findings as above.           elm-remote.        Dictated by (CST): Ezio Mascorro MD on 10/14/2021 at 11:11 PM      Finalized by (CST): Ezio Mascorro MD on 10/14/2021 at 11:20 PM         ASSESSMENT AND PLAN:  Patient is a 68-year-old female who presents for follow-up of her bilateral neck and bilateral low back pain (right greater than left).  I have recommended x-rays of the cervical and lumbar spine.  I have recommended she begin physical therapy, Tylenol, ice/heat, and Lidoderm patches.  He she should follow-up with me in about 6 to 8 weeks.  If her symptoms persist, then I would recommend an MRI of the lumbar spine.  We can also consider right lower lumbar facet joint injections versus RIGHT neck and upper back trigger point injections as her neurologic exam is benign.       RTC in 6 to 8 weeks  Discharge Instructions were provided as documented in AVS summary.  The patient was in agreement with the assessment and plan.  All questions were answered.  There were no barriers to learning.         1. Facet syndrome, lumbar    2. Mechanical low back pain    3. Lumbar  spondylosis    4. DDD (degenerative disc disease), lumbosacral    5. Lumbar foraminal stenosis    6. Bulge of lumbar disc without myelopathy    7. Lumbar radiculopathy    8. Trigger point of neck    9. Cervical facet syndrome    10. DDD (degenerative disc disease), cervical    11. Cervical spondylosis        Alex B. Behar MD  Physical Medicine and Rehabilitation/Sports Medicine  [facility]

## 2024-04-30 ENCOUNTER — TELEPHONE (OUTPATIENT)
Dept: FAMILY MEDICINE CLINIC | Facility: CLINIC | Age: 69
End: 2024-04-30

## 2024-04-30 NOTE — TELEPHONE ENCOUNTER
Patient calling, dental office did not received office visit note clearing her for surgery. I called the dental office, obtained fax number   [phone] and I faxed office visit notes from 4/18/24 to dental office     Patient notified.

## 2024-05-19 DIAGNOSIS — I63.81 LACUNAR STROKE (HCC): ICD-10-CM

## 2024-05-19 DIAGNOSIS — R41.3 MEMORY PROBLEM: ICD-10-CM

## 2024-05-19 DIAGNOSIS — R51.9 CHRONIC DAILY HEADACHE: ICD-10-CM

## 2024-05-20 RX ORDER — MEMANTINE HYDROCHLORIDE 5 MG/1
5 TABLET ORAL DAILY
Qty: 90 TABLET | Refills: 0 | Status: SHIPPED | OUTPATIENT
Start: 2024-05-20

## 2024-05-20 NOTE — TELEPHONE ENCOUNTER
Requested Prescriptions     Pending Prescriptions Disp Refills    MEMANTINE 5 MG Oral Tab [Pharmacy Med Name: MEMANTINE 5MG TABLETS] 90 tablet 0     Sig: TAKE 1 TABLET(5 MG) BY MOUTH DAILY        LOV: 3/13/24   Follow up: 6 months   NOV: none    Last refill/[place]: 2/19/24   218

## 2024-06-19 DIAGNOSIS — E53.8 VITAMIN B12 DEFICIENCY: Primary | ICD-10-CM

## 2024-06-19 NOTE — TELEPHONE ENCOUNTER
Requested Prescriptions     Pending Prescriptions Disp Refills    CYANOCOBALAMIN 1000 MCG Oral Tab [Pharmacy Med Name: VITAMIN B-12 1000MCG TABLETS] 90 tablet 0     Sig: TAKE 1 TABLET BY MOUTH DAILY       Last OV: 3/13/24  Next OV: None      Should the patient continue with the Vit B12, should labs be repeated, or should it be discontinued?  Please advise.  Thanks.  Reviewed and electronically signed by: LAKISHA Ferreira

## 2024-06-24 DIAGNOSIS — M80.00XA AGE-RELATED OSTEOPOROSIS WITH CURRENT PATHOLOGICAL FRACTURE, INITIAL ENCOUNTER: ICD-10-CM

## 2024-06-24 RX ORDER — MELATONIN
1000 DAILY
Qty: 90 TABLET | Refills: 0 | Status: SHIPPED | OUTPATIENT
Start: 2024-06-24

## 2024-06-24 RX ORDER — ALENDRONATE SODIUM 70 MG/1
70 TABLET ORAL
Qty: 12 TABLET | Refills: 3 | Status: SHIPPED | OUTPATIENT
Start: 2024-06-24

## 2024-06-24 NOTE — TELEPHONE ENCOUNTER
Pharmacy requesting refill      alendronate 70 MG Oral Tab, Take 1 tablet (70 mg total) by mouth every 7 days., Disp: 12 tablet, Rfl: 3

## 2024-07-16 ENCOUNTER — NURSE TRIAGE (OUTPATIENT)
Dept: FAMILY MEDICINE CLINIC | Facility: CLINIC | Age: 69
End: 2024-07-16

## 2024-07-16 NOTE — TELEPHONE ENCOUNTER
Action Requested: Summary for Provider     []  Critical Lab, Recommendations Needed  [] Need Additional Advice  []   FYI    []   Need Orders  [] Need Medications Sent to Pharmacy  []  Other     SUMMARY: [place], [place] [id number], [place]  Patient reports last week had abdominal discomfort.  \"Not pain, just discomfort.  Belly looks swollen, like when having a baby, but I'm 68 so it can't be a baby.  Feels like something moving.\"  Last BM yesterday, sometimes has to push.  Denies rectal bleeding, fever, nausea or vomiting.  Clothes don't fit as they normally do.  Plan:  Scheduled office visit tomorrow.  Advised if symptoms worsen prior, go to ED.  Patient agreed to plan.  She was not in any distress at time of call.    Reason for call: Belly looks swollen.  Onset: 1 week    Spoke with patient,  verified.       LReason for Disposition   MILD SWELLING of abdomen (e.g., looks distended or swollen) that is NEW-ONSET or getting worse    Protocols used: Abdomen Bloating and Swelling-A-OH

## 2024-07-17 ENCOUNTER — OFFICE VISIT (OUTPATIENT)
Dept: FAMILY MEDICINE CLINIC | Facility: CLINIC | Age: 69
End: 2024-07-17

## 2024-07-17 VITALS
HEIGHT: 64 IN | WEIGHT: 173 LBS | BODY MASS INDEX: 29.53 KG/M2 | SYSTOLIC BLOOD PRESSURE: 127 MMHG | DIASTOLIC BLOOD PRESSURE: 76 MMHG | HEART RATE: 60 BPM

## 2024-07-17 DIAGNOSIS — K59.01 SLOW TRANSIT CONSTIPATION: Primary | ICD-10-CM

## 2024-07-17 PROCEDURE — 3074F SYST BP LT 130 MM HG: CPT | Performed by: NURSE PRACTITIONER

## 2024-07-17 PROCEDURE — 3044F HG A1C LEVEL LT 7.0%: CPT | Performed by: NURSE PRACTITIONER

## 2024-07-17 PROCEDURE — 3008F BODY MASS INDEX DOCD: CPT | Performed by: NURSE PRACTITIONER

## 2024-07-17 PROCEDURE — 3061F NEG MICROALBUMINURIA REV: CPT | Performed by: NURSE PRACTITIONER

## 2024-07-17 PROCEDURE — 1159F MED LIST DOCD IN RCRD: CPT | Performed by: NURSE PRACTITIONER

## 2024-07-17 PROCEDURE — 1125F AMNT PAIN NOTED PAIN PRSNT: CPT | Performed by: NURSE PRACTITIONER

## 2024-07-17 PROCEDURE — 1160F RVW MEDS BY RX/DR IN RCRD: CPT | Performed by: NURSE PRACTITIONER

## 2024-07-17 PROCEDURE — 99213 OFFICE O/P EST LOW 20 MIN: CPT | Performed by: NURSE PRACTITIONER

## 2024-07-17 PROCEDURE — 3078F DIAST BP <80 MM HG: CPT | Performed by: NURSE PRACTITIONER

## 2024-07-17 RX ORDER — BISACODYL 5 MG/1
5 TABLET, DELAYED RELEASE ORAL
Qty: 30 TABLET | Refills: 0 | Status: SHIPPED | OUTPATIENT
Start: 2024-07-17

## 2024-07-17 NOTE — PROGRESS NOTES
HPI    Patient presents for concerns of abdominal pain, constipation and abdominal bloating for the past week.  Has not taken anything to help with symptoms.  Feels like food is not moving through stomach.      Review of Systems   Gastrointestinal:  Positive for abdominal pain and constipation.        Abdominal bloating.        Vitals:    24 1051   BP: 127/76   Pulse: 60   Weight: 173 lb (78.5 kg)   Height: 5' 4\" (1.626 m)     Body mass index is 29.7 kg/m².    Health Maintenance   Topic Date Due    Diabetes Care Foot Exam  Never done    COVID-19 Vaccine ( season) 2023    Mammogram  10/28/2023    MA Annual Health Assessment  Never done    Annual Depression Screening  2024    Fall Risk Screening (Annual)  2024    Tobacco Cessation Counseling  Never done    Diabetes Care Dilated Eye Exam  2024    Influenza Vaccine (1) 10/01/2024    Diabetes Care A1C  10/18/2024    Diabetes Care: GFR  2025    Diabetes Care: Microalb/Creat Ratio  2025    Colorectal Cancer Screening  2027    DEXA Scan  Completed    Pneumococcal Vaccine: 65+ Years  Completed    Zoster Vaccines  Completed       No LMP recorded. Patient is postmenopausal.    Past Medical History:    Angiomyolipoma of right kidney    Chronic low back pain    Diabetes (HCC)    Esophageal reflux    Gastritis    High blood pressure    High cholesterol    Osteoarthritis    Psoriasis    Stroke (HCC)    \"small stroke\" many years ago    Visual impairment    readers       .  Past Surgical History:   Procedure Laterality Date          Cholecystectomy      Spine surgery procedure unlisted      Tonsillectomy      Total knee replacement Right     no associated bleeding complications       Family History   Problem Relation Age of Onset    Diabetes Mother         Diabetes Type 2    Other (Other) Mother     Prostate Cancer Father     Other (Other) Sister         Gallbladder disease    Hypertension Other          Family h/o    Other (Other) Other         Family h    Bleeding Disorders Neg     DVT/VTE Neg     Breast Cancer Neg     Ovarian Cancer Neg        Social History     Socioeconomic History    Marital status:      Spouse name: Not on file    Number of children: Not on file    Years of education: Not on file    Highest education level: Not on file   Occupational History    Not on file   Tobacco Use    Smoking status: Light Smoker     Current packs/day: 0.25     Average packs/day: 0.3 packs/day for 47.0 years (11.8 ttl pk-yrs)     Types: Cigarettes    Smokeless tobacco: Never   Vaping Use    Vaping status: Never Used   Substance and Sexual Activity    Alcohol use: No     Alcohol/week: 0.0 standard drinks of alcohol    Drug use: Never    Sexual activity: Not on file   Other Topics Concern     Service Not Asked    Blood Transfusions Not Asked    Caffeine Concern Yes     Comment: Soda daily    Occupational Exposure Not Asked    Hobby Hazards Not Asked    Sleep Concern Not Asked    Stress Concern Not Asked    Weight Concern Not Asked    Special Diet Not Asked    Back Care Not Asked    Exercise No    Bike Helmet Not Asked    Seat Belt Not Asked    Self-Exams Not Asked   Social History Narrative    \"Mitra\" is windowed but has a long term boyfriend for the last 4 yrs. Mother of 2 adult children with 1 granddaughter. She formerly worked from [employer] in [place], [place]. Patient lives alone in [place], [place].            The patient does not use an assistive device..      The patient does live in a home with stairs.     Social Determinants of Health     Financial Resource Strain: Not on file   Food Insecurity: No Food Insecurity (10/6/2023)    Food Insecurity     Food Insecurity: Never true   Transportation Needs: No Transportation Needs (10/6/2023)    Transportation Needs     Lack of Transportation: No   Physical Activity: Not on file   Stress: Not on file   Social Connections: Not on file   Housing  Stability: Low Risk  (10/6/2023)    Housing Stability     Housing Instability: No     Housing Instability Emergency: Not on file     Crib or Bassinette: Not on file       Current Outpatient Medications   Medication Sig Dispense Refill    docusate sodium 50 MG Oral Cap Take 1 capsule (50 mg total) by mouth 2 (two) times daily. 60 capsule 0    bisacodyl (DULCOLAX) 5 MG Oral Tab EC Take 1 tablet (5 mg total) by mouth daily as needed for constipation. 30 tablet 0    CYANOCOBALAMIN 1000 MCG Oral Tab TAKE 1 TABLET BY MOUTH DAILY 90 tablet 0    alendronate 70 MG Oral Tab Take 1 tablet (70 mg total) by mouth every 7 days. 12 tablet 3    MEMANTINE 5 MG Oral Tab TAKE 1 TABLET(5 MG) BY MOUTH DAILY 90 tablet 0    Empagliflozin (JARDIANCE) 25 MG Oral Tab Take 1 tablet by mouth daily. 90 tablet 1    metFORMIN  MG Oral Tablet 24 Hr Take 1 tablet (750 mg total) by mouth daily. 90 tablet 1    aspirin (ASPIRIN 81) 81 MG Oral Chew Tab Chew 1 tablet (81 mg total) by mouth daily. 90 tablet 3    atorvastatin 10 MG Oral Tab Take 1 tablet (10 mg total) by mouth daily. 90 tablet 3    ergocalciferol 1.25 MG (60338 UT) Oral Cap Take 1 capsule (50,000 Units total) by mouth once a week. 12 capsule 0    meclizine 12.5 MG Oral Tab Take 1 tablet (12.5 mg total) by mouth 3 (three) times daily as needed. 40 tablet 1    Losartan Potassium-HCTZ 100-12.5 MG Oral Tab Take 1 tablet by mouth daily. 90 tablet 1    ONETOUCH ULTRA In Vitro Strip daily.      Lancets (ONETOUCH DELICA PLUS [id number]) Does not apply Misc 1 strip by In Vitro route daily.         Allergies:  Allergies   Allergen Reactions    Lisinopril SWELLING and TONGUE SWELLING    Codeine ANXIETY     Caused pimples    Norco [Hydrocodone-Acetaminophen] ANXIETY       Physical Exam  Vitals and nursing note reviewed.   Constitutional:       General: She is not in acute distress.     Appearance: Normal appearance.   HENT:      Head: Normocephalic and atraumatic.   Cardiovascular:      Rate and  Rhythm: Normal rate and regular rhythm.      Pulses: Normal pulses.      Heart sounds: Normal heart sounds. No murmur heard.  Pulmonary:      Effort: Pulmonary effort is normal. No respiratory distress.      Breath sounds: Normal breath sounds. No stridor. No wheezing, rhonchi or rales.   Chest:      Chest wall: No tenderness.   Abdominal:      General: Abdomen is flat. There is no distension.      Palpations: Abdomen is soft. There is no mass.      Tenderness: There is abdominal tenderness in the left lower quadrant. There is no guarding or rebound.      Hernia: No hernia is present.   Neurological:      Mental Status: She is alert and oriented to person, place, and time.          Assessment and Plan:  Problem List Items Addressed This Visit    None  Visit Diagnoses       Slow transit constipation    -  Primary    Relevant Medications    docusate sodium 50 MG Oral Cap    bisacodyl (DULCOLAX) 5 MG Oral Tab EC           Colace bid, ducolax daily prn.  Supportive care discussed.  Follow up as needed.       Discussed plan of care with patient and patient is in agreement.  All questions answered. Patient to call with questions or concerns.    Encouraged to sign up for My Chart if not already registered.

## 2024-08-08 NOTE — TELEPHONE ENCOUNTER
Pharmacy requesting refill      ergocalciferol 1.25 MG (28641 UT) Oral Cap, Take 1 capsule (50,000 Units total) by mouth once a week., Disp: 12 capsule, Rfl: 0

## 2024-08-12 RX ORDER — ERGOCALCIFEROL 1.25 MG/1
50000 CAPSULE ORAL WEEKLY
Qty: 12 CAPSULE | Refills: 0 | Status: SHIPPED | OUTPATIENT
Start: 2024-08-12

## 2024-08-12 NOTE — TELEPHONE ENCOUNTER
Please review. Protocol Failed; No Protocol    Requested Prescriptions   Pending Prescriptions Disp Refills    ergocalciferol 1.25 MG (26737 UT) Oral Cap 12 capsule 0     Sig: Take 1 capsule (50,000 Units total) by mouth once a week.       There is no refill protocol information for this order              Recent Outpatient Visits              3 weeks ago Slow transit constipation    [facility], Meghan Enamorado APRN    Office Visit    3 months ago Facet syndrome, lumbar    [facility] [place] [facility]har, Alex, MD    Office Visit    3 months ago Preoperative clearance    [facility], Keegan Butler MD    Office Visit    5 months ago Cervical spondylosis with radiculopathy    [facility]    [facility]    9 months ago Lacunar stroke (HCC)    [facility]    [facility] Visit

## 2024-08-12 NOTE — TELEPHONE ENCOUNTER
Refill request:    Losartan/hydrochlorothiazide 100/12.5mg tablets - take 1 tablet by mouth daily  Qty: 90    Please advise

## 2024-08-16 RX ORDER — LOSARTAN POTASSIUM AND HYDROCHLOROTHIAZIDE 12.5; 1 MG/1; MG/1
1 TABLET ORAL DAILY
Qty: 90 TABLET | Refills: 3 | Status: SHIPPED | OUTPATIENT
Start: 2024-08-16

## 2024-08-16 NOTE — TELEPHONE ENCOUNTER
REFILL PASSED PER [facility] PROTOCOLS    Requested Prescriptions   Pending Prescriptions Disp Refills    Losartan Potassium-HCTZ 100-12.5 MG Oral Tab 90 tablet 3     Sig: Take 1 tablet by mouth daily.       Hypertension Medications Protocol Passed - 8/12/2024  5:43 PM        Passed - CMP or BMP in past 12 months        Passed - Last BP reading less than 140/90     BP Readings from Last 1 Encounters:   07/17/24 127/76               Passed - In person appointment or virtual visit in the past 12 mos or appointment in next 3 mos     Recent Outpatient Visits              1 month ago Slow transit constipation    [facility], Meghan Enamorado APRN    Office Visit    3 months ago Facet syndrome, lumbar    [facility], [place] Behar, Alex, MD    Office Visit    4 months ago Preoperative clearance    [facility], Keegan Butler MD    Office Visit    5 months ago Cervical spondylosis with radiculopathy    [facility], [place], Riya Cam, DO    Office Visit    9 months ago Lacunar stroke (HCC)    [facility] [place], Riya Cam, DO    Office Visit                      Passed - EGFRCR or GFRNAA > 50     GFR Evaluation  EGFRCR: 94 , resulted on 4/18/2024                 Recent Outpatient Visits              1 month ago Slow transit constipation    [facility], Megahn Enamorado APRN    Office Visit    3 months ago Facet syndrome, lumbar    [facility], [place] Behar, Alex, MD    Office Visit    4 months ago Preoperative clearance    [facility], Keegan Butler MD    Office Visit    5 months ago Cervical spondylosis with radiculopathy    [facility], [place], Riya Cam, DO    Office Visit    9  months ago Lacunar stroke (HCC)    [facility], Riay Diggs,     Office Visit

## 2024-10-14 NOTE — TELEPHONE ENCOUNTER
Refill passed per [facility] protocol.  Requested Prescriptions   Pending Prescriptions Disp Refills    JARDIANCE 25 MG Oral Tab [Pharmacy Med Name: JARDIANCE 25MG TABLETS] 90 tablet 1     Sig: Take 1 tablet by mouth daily.       Diabetes Medication Protocol Passed - 10/14/2024 11:00 AM        Passed - Last A1C < 7.5 and within past 6 months     Lab Results   Component Value Date    A1C 6.6 (A) 04/18/2024             Passed - In person appointment or virtual visit in the past 6 mos or appointment in next 3 mos     Recent Outpatient Visits              2 months ago Slow transit constipation    [facility], Meghan Enamorado APRN    Office Visit    5 months ago Facet syndrome, lumbar    [facility], [place] Behar, Alex, MD    Office Visit    5 months ago Preoperative clearance    [facility], Keegan Butler MD    Office Visit    7 months ago Cervical spondylosis with radiculopathy    [facility], [place] Riya Emanuel,     Office Visit    11 months ago Lacunar stroke (HCC)    [facility] [place], Riya Cam, DO    Office Visit                      Passed - Microalbumin procedure in past 12 months or taking ACE/ARB        Passed - EGFRCR or GFRNAA > 50     GFR Evaluation  EGFRCR: 94 , resulted on 4/18/2024          Passed - GFR in the past 12 months             Recent Outpatient Visits              2 months ago Slow transit constipation    [facility], Meghan Enamorado APRN    Office Visit    5 months ago Facet syndrome, lumbar    [facility], [place] Behar, Alex, MD    Office Visit    5 months ago Preoperative clearance    [facility], Keegan Butler MD    Office Visit    7 months ago Cervical spondylosis with radiculopathy     [facility], [facility], Riya Diggs,     Office Visit    11 months ago Lacunar stroke (HCC)    [facility], [facility], Riya Diggs,     Office Visit

## 2024-10-29 ENCOUNTER — NURSE TRIAGE (OUTPATIENT)
Dept: FAMILY MEDICINE CLINIC | Facility: CLINIC | Age: 69
End: 2024-10-29

## 2024-10-29 NOTE — TELEPHONE ENCOUNTER
Action Requested: Summary for Provider     []  Critical Lab, Recommendations Needed  [] Need Additional Advice  []   FYI    []   Need Orders  [] Need Medications Sent to Pharmacy  []  Other     SUMMARY: Patient c/o of back pain with some nausea was not able to sleep, upper back on right side for the last 2 weeks    Denies: numbness, chest pain, jaw pain    Has been taking Tylenol and special tea that did help.    Reviewed red flags patient verbalized understanding stated daughter is a nurse.    Future Appointments   Date Time Provider Department Center   10/30/2024  1:00 PM [facility] Webb [facility]N [facility]   11/18/2024  2:00 PM Keegan Mena MD [facility]         Reason for Disposition   MODERATE back pain (e.g., interferes with normal activities) and present > 3 days    Protocols used: Back Pain-A-[place]

## 2024-10-30 ENCOUNTER — OFFICE VISIT (OUTPATIENT)
Dept: FAMILY MEDICINE CLINIC | Facility: CLINIC | Age: 69
End: 2024-10-30

## 2024-10-30 VITALS
SYSTOLIC BLOOD PRESSURE: 121 MMHG | BODY MASS INDEX: 30.25 KG/M2 | WEIGHT: 177.19 LBS | HEART RATE: 79 BPM | HEIGHT: 64 IN | DIASTOLIC BLOOD PRESSURE: 69 MMHG

## 2024-10-30 DIAGNOSIS — M51.16 LUMBAR DISC HERNIATION WITH RADICULOPATHY: Primary | ICD-10-CM

## 2024-10-30 DIAGNOSIS — M48.061 LUMBAR FORAMINAL STENOSIS: ICD-10-CM

## 2024-10-30 PROCEDURE — 3078F DIAST BP <80 MM HG: CPT | Performed by: NURSE PRACTITIONER

## 2024-10-30 PROCEDURE — 1160F RVW MEDS BY RX/DR IN RCRD: CPT | Performed by: NURSE PRACTITIONER

## 2024-10-30 PROCEDURE — 99213 OFFICE O/P EST LOW 20 MIN: CPT | Performed by: NURSE PRACTITIONER

## 2024-10-30 PROCEDURE — 3008F BODY MASS INDEX DOCD: CPT | Performed by: NURSE PRACTITIONER

## 2024-10-30 PROCEDURE — 1125F AMNT PAIN NOTED PAIN PRSNT: CPT | Performed by: NURSE PRACTITIONER

## 2024-10-30 PROCEDURE — 1159F MED LIST DOCD IN RCRD: CPT | Performed by: NURSE PRACTITIONER

## 2024-10-30 PROCEDURE — 3074F SYST BP LT 130 MM HG: CPT | Performed by: NURSE PRACTITIONER

## 2024-10-30 RX ORDER — IBUPROFEN 600 MG/1
600 TABLET, FILM COATED ORAL EVERY 6 HOURS PRN
Qty: 40 TABLET | Refills: 0 | Status: SHIPPED | OUTPATIENT
Start: 2024-10-30 | End: 2024-11-29

## 2024-10-30 RX ORDER — CYCLOBENZAPRINE HCL 5 MG
5 TABLET ORAL 3 TIMES DAILY PRN
Qty: 30 TABLET | Refills: 0 | Status: SHIPPED | OUTPATIENT
Start: 2024-10-30

## 2024-10-30 NOTE — PROGRESS NOTES
HPI    Patient presents for concerns of back pack for the past two weeks.  Has been severe since yesterday.  Taking tylenol as needed with some relief.  Right side worse than left.  With radiation into right groin.  Was previously seeing Dr. Behar who recommended physical therapy as well as follow-up and potential MRI of his lumbar spine.  Patient never followed up for follow-up with physical therapy.    Review of Systems   Musculoskeletal:  Positive for back pain.   All other systems reviewed and are negative.       Vitals:    10/30/24 1249   BP: 121/69   Pulse: 79   Weight: 177 lb 3.2 oz (80.4 kg)   Height: 5' 4\" (1.626 m)     Body mass index is 30.42 kg/m².    Health Maintenance   Topic Date Due    Diabetes Care Foot Exam  Never done    Mammogram  10/28/2023    MA Annual Health Assessment  Never done    Annual Depression Screening  2024    Fall Risk Screening (Annual)  2024    Tobacco Cessation Counseling  Never done    Diabetes Care Dilated Eye Exam  2024    COVID-19 Vaccine ( season) 2024    Influenza Vaccine (1) 10/01/2024    Diabetes Care A1C  10/18/2024    Diabetes Care: GFR  2025    Diabetes Care: Microalb/Creat Ratio  2025    Colorectal Cancer Screening  2027    DEXA Scan  Completed    Pneumococcal Vaccine: 65+ Years  Completed    Zoster Vaccines  Completed       No LMP recorded. Patient is postmenopausal.    Past Medical History:    Angiomyolipoma of right kidney    Chronic low back pain    Diabetes (HCC)    Esophageal reflux    Gastritis    High blood pressure    High cholesterol    Osteoarthritis    Psoriasis    Stroke (HCC)    \"small stroke\" many years ago    Visual impairment    readers       .  Past Surgical History:   Procedure Laterality Date          Cholecystectomy      Spine surgery procedure unlisted      Tonsillectomy      Total knee replacement Right     no associated bleeding complications       Family History    Problem Relation Age of Onset    Diabetes Mother         Diabetes Type 2    Other (Other) Mother     Prostate Cancer Father     Other (Other) Sister         Gallbladder disease    Hypertension Other         Family h/o    Other (Other) Other         Family h    Bleeding Disorders Neg     DVT/VTE Neg     Breast Cancer Neg     Ovarian Cancer Neg        Social History     Socioeconomic History    Marital status:      Spouse name: Not on file    Number of children: Not on file    Years of education: Not on file    Highest education level: Not on file   Occupational History    Not on file   Tobacco Use    Smoking status: Light Smoker     Current packs/day: 0.25     Average packs/day: 0.3 packs/day for 47.0 years (11.8 ttl pk-yrs)     Types: Cigarettes    Smokeless tobacco: Never   Vaping Use    Vaping status: Never Used   Substance and Sexual Activity    Alcohol use: No     Alcohol/week: 0.0 standard drinks of alcohol    Drug use: Never    Sexual activity: Not on file   Other Topics Concern     Service Not Asked    Blood Transfusions Not Asked    Caffeine Concern Yes     Comment: Soda daily    Occupational Exposure Not Asked    Hobby Hazards Not Asked    Sleep Concern Not Asked    Stress Concern Not Asked    Weight Concern Not Asked    Special Diet Not Asked    Back Care Not Asked    Exercise No    Bike Helmet Not Asked    Seat Belt Not Asked    Self-Exams Not Asked   Social History Narrative    \"Mitra\" is windowed but has a long term boyfriend for the last 4 yrs. Mother of 2 adult children with 1 granddaughter. She formerly worked from [employer] in [place], [place]. Patient lives alone in [place], [place].            The patient does not use an assistive device..      The patient does live in a home with stairs.     Social Drivers of Health     Financial Resource Strain: Not on file   Food Insecurity: No Food Insecurity (10/6/2023)    Food Insecurity     Food Insecurity: Never true   Transportation  Needs: No Transportation Needs (10/6/2023)    Transportation Needs     Lack of Transportation: No   Physical Activity: Not on file   Stress: Not on file   Social Connections: Not on file   Housing Stability: Low Risk  (10/6/2023)    Housing Stability     Housing Instability: No     Housing Instability Emergency: Not on file     Crib or Bassinette: Not on file       Current Outpatient Medications   Medication Sig Dispense Refill    cyclobenzaprine 5 MG Oral Tab Take 1 tablet (5 mg total) by mouth 3 (three) times daily as needed for Muscle spasms. 30 tablet 0    ibuprofen 600 MG Oral Tab Take 1 tablet (600 mg total) by mouth every 6 (six) hours as needed for Pain. 40 tablet 0    Losartan Potassium-HCTZ 100-12.5 MG Oral Tab Take 1 tablet by mouth daily. 90 tablet 3    alendronate 70 MG Oral Tab Take 1 tablet (70 mg total) by mouth every 7 days. 12 tablet 3    metFORMIN  MG Oral Tablet 24 Hr Take 1 tablet (750 mg total) by mouth daily. 90 tablet 1    aspirin (ASPIRIN 81) 81 MG Oral Chew Tab Chew 1 tablet (81 mg total) by mouth daily. 90 tablet 3    atorvastatin 10 MG Oral Tab Take 1 tablet (10 mg total) by mouth daily. 90 tablet 3    ONETOUCH ULTRA In Vitro Strip daily.         Allergies:  Allergies[1]    Physical Exam  Vitals and nursing note reviewed.   Constitutional:       General: She is not in acute distress.     Appearance: Normal appearance.   Cardiovascular:      Rate and Rhythm: Normal rate and regular rhythm.      Heart sounds: Normal heart sounds.   Pulmonary:      Effort: Pulmonary effort is normal. No respiratory distress.      Breath sounds: Normal breath sounds. No stridor. No wheezing, rhonchi or rales.   Chest:      Chest wall: No tenderness.   Musculoskeletal:      Lumbar back: Spasms and tenderness present. Negative right straight leg raise test and negative left straight leg raise test.   Neurological:      Mental Status: She is alert and oriented to person, place, and time.           Assessment and Plan:  Problem List Items Addressed This Visit          Neuro    Lumbar disc herniation with radiculopathy - Primary    Relevant Medications    cyclobenzaprine 5 MG Oral Tab    ibuprofen 600 MG Oral Tab    Lumbar foraminal stenosis    Relevant Medications    cyclobenzaprine 5 MG Oral Tab    ibuprofen 600 MG Oral Tab      Offered steroids as a form of treatment since pain is currently severe.  Patient refused due to potential side effect of increased blood sugars.  Will treat with ibuprofen as well as Flexeril as needed.  Recommended follow-up for physical therapy per previous orders from physiatry.  Orders printed for therapy for patient to call to schedule.  Recommend follow-up with physiatry per previous notes of Dr.Behar.  Follow-up as needed.     Discussed plan of care with patient and patient is in agreement.  All questions answered. Patient to call with questions or concerns.    Encouraged to sign up for My Chart if not already registered.        [1]   Allergies  Allergen Reactions    Lisinopril SWELLING and TONGUE SWELLING    Codeine ANXIETY     Caused pimples    Hydrocodone-Acetaminophen ANXIETY

## 2024-11-13 NOTE — TELEPHONE ENCOUNTER
Pt is requesting refill for the following medication         metFORMIN  MG Oral Tablet 24 Hr, Take 1 tablet (750 mg total) by mouth daily., Disp: 90 tablet, Rfl: 1

## 2024-11-14 ENCOUNTER — HOSPITAL ENCOUNTER (OUTPATIENT)
Dept: MAMMOGRAPHY | Facility: HOSPITAL | Age: 69
Discharge: HOME | End: 2024-11-14
Attending: FAMILY MEDICINE
Payer: MEDICARE

## 2024-11-14 DIAGNOSIS — Z12.31 SCREENING MAMMOGRAM FOR BREAST CANCER: ICD-10-CM

## 2024-11-14 PROCEDURE — 77067 SCR MAMMO BI INCL CAD: CPT | Performed by: FAMILY MEDICINE

## 2024-11-14 PROCEDURE — 77063 BREAST TOMOSYNTHESIS BI: CPT | Performed by: FAMILY MEDICINE

## 2024-11-18 ENCOUNTER — TELEPHONE (OUTPATIENT)
Dept: FAMILY MEDICINE CLINIC | Facility: CLINIC | Age: 69
End: 2024-11-18

## 2024-11-18 ENCOUNTER — OFFICE VISIT (OUTPATIENT)
Dept: FAMILY MEDICINE CLINIC | Facility: CLINIC | Age: 69
End: 2024-11-18

## 2024-11-18 VITALS
SYSTOLIC BLOOD PRESSURE: 127 MMHG | HEART RATE: 73 BPM | HEIGHT: 64 IN | DIASTOLIC BLOOD PRESSURE: 67 MMHG | WEIGHT: 173 LBS | BODY MASS INDEX: 29.53 KG/M2

## 2024-11-18 DIAGNOSIS — E55.9 VITAMIN D DEFICIENCY: ICD-10-CM

## 2024-11-18 DIAGNOSIS — Z12.11 COLON CANCER SCREENING: ICD-10-CM

## 2024-11-18 DIAGNOSIS — G43.109 MIGRAINE WITH AURA AND WITHOUT STATUS MIGRAINOSUS, NOT INTRACTABLE: ICD-10-CM

## 2024-11-18 DIAGNOSIS — E66.01 MORBID (SEVERE) OBESITY DUE TO EXCESS CALORIES (HCC): ICD-10-CM

## 2024-11-18 DIAGNOSIS — M80.00XA AGE-RELATED OSTEOPOROSIS WITH CURRENT PATHOLOGICAL FRACTURE, INITIAL ENCOUNTER: ICD-10-CM

## 2024-11-18 DIAGNOSIS — F17.210 CIGARETTE NICOTINE DEPENDENCE WITHOUT COMPLICATION: ICD-10-CM

## 2024-11-18 DIAGNOSIS — Z87.39 HISTORY OF CHRONIC BACK PAIN: ICD-10-CM

## 2024-11-18 DIAGNOSIS — M54.41 CHRONIC BILATERAL LOW BACK PAIN WITH BILATERAL SCIATICA: ICD-10-CM

## 2024-11-18 DIAGNOSIS — E78.00 PURE HYPERCHOLESTEROLEMIA: Chronic | ICD-10-CM

## 2024-11-18 DIAGNOSIS — G89.29 CHRONIC BILATERAL LOW BACK PAIN WITH BILATERAL SCIATICA: ICD-10-CM

## 2024-11-18 DIAGNOSIS — I73.9 PVD (PERIPHERAL VASCULAR DISEASE) WITH CLAUDICATION (HCC): ICD-10-CM

## 2024-11-18 DIAGNOSIS — Z12.31 ENCOUNTER FOR SCREENING MAMMOGRAM FOR MALIGNANT NEOPLASM OF BREAST: ICD-10-CM

## 2024-11-18 DIAGNOSIS — M54.42 CHRONIC BILATERAL LOW BACK PAIN WITH BILATERAL SCIATICA: ICD-10-CM

## 2024-11-18 DIAGNOSIS — Z00.00 MEDICARE ANNUAL WELLNESS VISIT, INITIAL: Primary | ICD-10-CM

## 2024-11-18 DIAGNOSIS — I10 ESSENTIAL HYPERTENSION: Chronic | ICD-10-CM

## 2024-11-18 DIAGNOSIS — Z91.81 RISK FOR FALLS: ICD-10-CM

## 2024-11-18 DIAGNOSIS — E66.01 CLASS 2 SEVERE OBESITY DUE TO EXCESS CALORIES WITH SERIOUS COMORBIDITY AND BODY MASS INDEX (BMI) OF 39.0 TO 39.9 IN ADULT (HCC): ICD-10-CM

## 2024-11-18 DIAGNOSIS — N39.46 MIXED STRESS AND URGE URINARY INCONTINENCE: ICD-10-CM

## 2024-11-18 DIAGNOSIS — E11.9 TYPE 2 DIABETES MELLITUS WITHOUT COMPLICATION, WITHOUT LONG-TERM CURRENT USE OF INSULIN (HCC): ICD-10-CM

## 2024-11-18 DIAGNOSIS — E66.812 CLASS 2 SEVERE OBESITY DUE TO EXCESS CALORIES WITH SERIOUS COMORBIDITY AND BODY MASS INDEX (BMI) OF 39.0 TO 39.9 IN ADULT (HCC): ICD-10-CM

## 2024-11-18 DIAGNOSIS — Z86.73 HISTORY OF CVA (CEREBROVASCULAR ACCIDENT): ICD-10-CM

## 2024-11-18 RX ORDER — METFORMIN HYDROCHLORIDE 750 MG/1
750 TABLET, EXTENDED RELEASE ORAL DAILY
Qty: 10 TABLET | Refills: 0 | Status: SHIPPED | OUTPATIENT
Start: 2024-11-18 | End: 2024-11-18

## 2024-11-18 RX ORDER — FLASH GLUCOSE SENSOR
KIT MISCELLANEOUS
Qty: 2 EACH | Refills: 11 | Status: CANCELLED | OUTPATIENT
Start: 2024-11-18

## 2024-11-18 RX ORDER — METFORMIN HYDROCHLORIDE 750 MG/1
750 TABLET, EXTENDED RELEASE ORAL DAILY
Qty: 90 TABLET | Refills: 3 | Status: SHIPPED | OUTPATIENT
Start: 2024-11-18

## 2024-11-18 RX ORDER — FLASH GLUCOSE SENSOR
KIT MISCELLANEOUS
Qty: 2 EACH | Refills: 11 | Status: SHIPPED | OUTPATIENT
Start: 2024-11-18

## 2024-11-18 NOTE — TELEPHONE ENCOUNTER
[place] Language Line: Lucy ID number [id number]. Patient called, verified Name and . She is following up on refill of metformin. States she ran out of metformin and was able to get an emergency 3-day supply from the pharmacy, but is now out of medication again. Medication pended to run through protocol. Verified pharmacy.     She has an appointment with primary care provider today for annual visit.

## 2024-11-18 NOTE — PROGRESS NOTES
Subjective:   Page Pritchett is a 69 year old female who presents for a MA AHA (Medicare Advantage Annual Health Assessment) and Initial Annual Wellness Visit (outside the first 12 months of Medicare eligibility, no prior AWV) and scheduled follow up of multiple significant but stable problems.     Page Pritchett is a 69 year old female is here for routine periodic health screening and examination.  His last physical exam was many years ago.  His last ECG was 1 years ago and was abnormal: abnl. His last diabetes screening test was 1 years ago and was abnormal: Hx diabetes.   His last cholesterol test was 1 year ago and was  Hyperlipidemia .  Last dentist visit was 6months ago.  He is not current with his Td immunization.  He is current with his Flu vaccination.  Patient last colonoscopy was 5 years ago. He is a smoker.      History/Other:   Fall Risk Assessment:   She has been screened for Falls and is High Risk. Fall Prevention information provided to patient in After Visit Summary.    Do you feel unsteady when standing or walking?: Yes  Do you worry about falling?: Yes  Have you fallen in the past year?: No     Cognitive Assessment:   She had a completely normal cognitive assessment - see flowsheet entries     Functional Ability/Status:   Page Pritchett has some abnormal functions as listed below:  She has Dressing and/or Bathing issues based on screening of functional status.  Difficulty dressing or bathing?: Yes  Bathing or Showering: Able without help  Dressing: Able without help  She has Driving difficulties based on screening of functional status. She has difficulties Shopping for Groceries based on screening of functional status. She has difficulties Taking Meds as Rx'd based on screening of functional status. She has Vision problems based on screening of functional status. She has Walking problems based on screening of functional status. She has problems with Daily Activities based on screening of  functional status. She has problems with Memory based on screening of functional status.       Depression Screening (PHQ):  PHQ-2 SCORE: 0  , done 11/18/2024   Last [place] Suicide Screening on 11/18/2024 was No Risk.          Advanced Directives:   She does have a Living Will but we do NOT have it on file in Epic.    She does have a POA but we do NOT have it on file in Epic.    Discussed Advance Care Planning with patient (and family/surrogate if present). Standard forms made available to patient in After Visit Summary.      Patient Active Problem List   Diagnosis    Bilateral hand numbness    Cerebrovascular accident (CVA) due to thrombosis of right middle cerebral artery (HCC)    Dizziness    Type 2 diabetes mellitus without complication, without long-term current use of insulin ([facility])    Migraine with aura and without status migrainosus, not intractable    Primary osteoarthritis of right knee    Cervical spinal stenosis    Essential hypertension    Hyperlipidemia    Cervical spondylosis with radiculopathy    NSAID induced gastritis    Cervical vertebral fusion    Class 2 severe obesity due to excess calories with serious comorbidity and body mass index (BMI) of 39.0 to 39.9 in adult (HCC)    Lumbar foraminal stenosis    Strain of gluteus medius    Lumbar disc herniation with radiculopathy    Bulge of lumbar disc without myelopathy    DDD (degenerative disc disease), lumbosacral    Facet syndrome, lumbar    Lumbar trigger point syndrome    Spondylolisthesis of lumbar region    S/P cervical spinal fusion    Hand weakness    Biomechanical lesion    Myalgia    Derangement of right shoulder joint    Morbid (severe) obesity due to excess calories ([facility])    Dysuria    Sore throat    Angiomyolipoma of right kidney    PVD (peripheral vascular disease) with claudication ([facility])    Smokers' cough ([facility])    Subconjunctival hemorrhage of left eye    Lacunar stroke ([facility])    Severe headache    Memory problem     Allergies:  She is  allergic to lisinopril, codeine, and hydrocodone-acetaminophen.    Current Medications:  Outpatient Medications Marked as Taking for the 24 encounter (Office Visit) with Keegan Mena MD   Medication Sig    metFORMIN  MG Oral Tablet 24 Hr Take 1 tablet (750 mg total) by mouth daily.    Continuous Glucose Sensor (FREESTYLE KANDI 14 DAY SENSOR) Does not apply Misc Use QID    cyclobenzaprine 5 MG Oral Tab Take 1 tablet (5 mg total) by mouth 3 (three) times daily as needed for Muscle spasms.    ibuprofen 600 MG Oral Tab Take 1 tablet (600 mg total) by mouth every 6 (six) hours as needed for Pain.    Losartan Potassium-HCTZ 100-12.5 MG Oral Tab Take 1 tablet by mouth daily.    alendronate 70 MG Oral Tab Take 1 tablet (70 mg total) by mouth every 7 days.    aspirin (ASPIRIN 81) 81 MG Oral Chew Tab Chew 1 tablet (81 mg total) by mouth daily.    atorvastatin 10 MG Oral Tab Take 1 tablet (10 mg total) by mouth daily.    ONETOUCH ULTRA In Vitro Strip daily.       Medical History:  She  has a past medical history of Angiomyolipoma of right kidney (2022), Chronic low back pain, Diabetes (HCC), Esophageal reflux, Gastritis, High blood pressure, High cholesterol, Osteoarthritis, Psoriasis, Stroke (HCC) (), and Visual impairment.  Surgical History:  She  has a past surgical history that includes cholecystectomy (); tonsillectomy (); total knee replacement (Right, ); ; and spine surgery procedure unlisted.   Family History:  Her family history includes Diabetes in her mother; Hypertension in an other family member; Other in her mother, sister, and another family member; Prostate Cancer in her father.  Social History:  She  reports that she has been smoking cigarettes. She has a 11.8 pack-year smoking history. She has never used smokeless tobacco. She reports that she does not drink alcohol and does not use drugs.    Tobacco:  Social History     Tobacco Use   Smoking Status Light Smoker     Current packs/day: 0.25    Average packs/day: 0.3 packs/day for 47.0 years (11.8 ttl pk-yrs)    Types: Cigarettes   Smokeless Tobacco Never     E-Cigarettes/Vaping       Questions Responses    E-Cigarette Use Never User           Tobacco cessation counseling for 3-10 minutes (add E/M code #95371).      CAGE Alcohol Screen:   CAGE screening score of 0 on 11/18/2024, showing low risk of alcohol abuse.      Patient Care Team:  Keegan Mena MD as PCP - General (Family Medicine)  Barry Ruth MD (Hematology and Oncology)  Km Petit MD (GASTROENTEROLOGY)  Ang Cortés MD (UROLOGY)    Review of Systems   Constitutional:  Negative for appetite change, diaphoresis, fatigue and fever.   HENT:  Negative for hearing loss and nosebleeds.    Eyes:  Negative for visual disturbance.   Respiratory:  Negative for shortness of breath.    Cardiovascular:  Negative for chest pain and palpitations.   Gastrointestinal:  Negative for abdominal pain.   Endocrine: Negative for polydipsia and polyuria.   Genitourinary:  Negative for hematuria.   Musculoskeletal:  Positive for arthralgias and back pain.   Skin:  Negative for rash.   Neurological:  Negative for dizziness and headaches.   Psychiatric/Behavioral:  Negative for dysphoric mood and sleep disturbance.           Objective:   Physical Exam  Vitals reviewed.   Constitutional:       General: She is not in acute distress.     Appearance: Normal appearance.   HENT:      Head: Normocephalic.      Right Ear: Tympanic membrane and ear canal normal.      Left Ear: Tympanic membrane and ear canal normal.      Mouth/Throat:      Pharynx: Oropharynx is clear.   Eyes:      Conjunctiva/sclera: Conjunctivae normal.   Neck:      Thyroid: No thyroid mass or thyromegaly.   Cardiovascular:      Rate and Rhythm: Normal rate and regular rhythm.      Heart sounds: Normal heart sounds. No murmur heard.  Pulmonary:      Effort: Pulmonary effort is normal.      Breath sounds:  Normal breath sounds.   Abdominal:      General: Bowel sounds are normal.      Palpations: Abdomen is soft.      Tenderness: There is no abdominal tenderness. There is no right CVA tenderness or left CVA tenderness.   Musculoskeletal:      Cervical back: Neck supple.   Lymphadenopathy:      Comments: LEs no edema    Skin:     Findings: No rash.   Psychiatric:         Mood and Affect: Mood normal.         /67 (BP Location: Right arm, Patient Position: Sitting, Cuff Size: adult)   Pulse 73   Ht 5' 4\" (1.626 m)   Wt 173 lb (78.5 kg)   BMI 29.70 kg/m²  Estimated body mass index is 29.7 kg/m² as calculated from the following:    Height as of this encounter: 5' 4\" (1.626 m).    Weight as of this encounter: 173 lb (78.5 kg).    Medicare Hearing Assessment:   Hearing Screening    Screening Method: Questionnaire  I have a problem hearing over the telephone: No I have trouble following the conversations when two or more people are talking at the same time: No   I have trouble understanding things on the TV: No I have to strain to understand conversations: No   I have to worry about missing the telephone ring or doorbell: No I have trouble hearing conversations in a noisy background such as a crowded room or restaurant: No   I get confused about where sounds come from: Yes I misunderstand some words in a sentence and need to ask people to repeat themselves: No   I especially have trouble understanding the speech of women and children: No I have trouble understanding the speaker in a large room such as at a meeting or place of [employer]: No   Many people I talk to seem to mumble (or don't speak clearly): No People get annoyed because I misunderstand what they say: No   I misunderstand what others are saying and make inappropriate responses: No I avoid social activities because I cannot hear well and fear I will reply improperly: No   Family members and friends have told me they think I may have hearing loss: No              Visual Acuity:   Right Eye Visual Acuity: Uncorrected Right Eye Chart Acuity: 20/50   Left Eye Visual Acuity: Uncorrected Left Eye Chart Acuity: 20/40   Both Eyes Visual Acuity: Uncorrected Both Eyes Chart Acuity: 20/25   Able To Tolerate Visual Acuity: Yes        Assessment & Plan:   Page Pritchett is a 69 year old female who presents for a Medicare Assessment.     1. Medicare annual wellness visit, initial (Primary)  2. Class 2 severe obesity due to excess calories with serious comorbidity and body mass index (BMI) of 39.0 to 39.9 in adult ([facility])  3. Type 2 diabetes mellitus without complication, without long-term current use of insulin ([facility])  -     Comp Metabolic Panel (14); Future; Expected date: 11/18/2024  -     Hemoglobin A1C; Future; Expected date: 11/18/2024  -     TSH W Reflex To Free T4; Future; Expected date: 11/18/2024  -     Urinalysis with Culture Reflex; Future; Expected date: 11/18/2024  -     Microalb/Creat Ratio, Random Urine; Future; Expected date: 11/18/2024  -     Podiatry Referral  4. Essential hypertension  -     CBC With Differential With Platelet; Future; Expected date: 11/18/2024  5. Pure hypercholesterolemia  -     Lipid Panel; Future; Expected date: 11/18/2024  6. Migraine with aura and without status migrainosus, not intractable  7. Age-related osteoporosis with current pathological fracture, initial encounter  -     XR DEXA BONE DENSITOMETRY (CPT=77080); Future; Expected date: 11/18/2024  8. Cigarette nicotine dependence without complication  -     CT LUNG LD SCREENING(CPT=71271); Future; Expected date: 11/18/2024  9. Morbid (severe) obesity due to excess calories ([facility])  10. History of CVA (cerebrovascular accident)  11. PVD (peripheral vascular disease) with claudication ([facility])  12. History of chronic back pain  13. Encounter for screening mammogram for malignant neoplasm of breast  14. Colon cancer screening  -     EVALUATE & TREAT, GASTRO (INTERNAL)  15. Mixed stress and urge  urinary incontinence  16. Risk for falls  17. Vitamin D deficiency  -     Vitamin D; Future; Expected date: 11/18/2024  18. Chronic bilateral low back pain with bilateral sciatica  -     Physiatry Referral - [facility]  Other orders  -     metFORMIN HCl ER; Take 1 tablet (750 mg total) by mouth daily.  Dispense: 90 tablet; Refill: 3  -     FreeStyle Babar 14 Day Sensor; Use QID  Dispense: 2 each; Refill: 11      1. Medicare annual wellness visit, initial      CPE PLAN:    LABS / TEST & ORDERS for today's visit :Blood test(s) ordered today for send out to [facility] lab:  Orders Placed This Encounter   Procedures    CBC With Differential With Platelet    Comp Metabolic Panel (14)    Hemoglobin A1C    Lipid Panel    TSH W Reflex To Free T4    Vitamin D    Urinalysis with Culture Reflex    Microalb/Creat Ratio, Random Urine     Referrals: EVALUATE & TREAT, GASTRO (INTERNAL)  PHYSIATRY - INTERNAL  PODIATRY - INTERNAL  XR DEXA BONE DENSITOMETRY (CPT=77080)  CT LUNG LD SCREENING(CPT=71271)  In-House; Urine dip.  Test/Procedures done today include: EKG.    IMMUNIZATIONS: none given today.    RECOMMENDATIONS given include: ANTICIPATORY GUIDANCE  topics covered today include: safety (i.e. seat belts, helmets, sunscreen, protective sports gear ), nutrition (i.e. healthy meals and snacks (i.e. avoid junk food and high-carbohydrate foods); athletic conditioning, fluids; low fat milk, limit to less than 20 oz. a day; dental care ), and Healthy habits& Social competence & Responsibilities: Recommendations on physical activity; exercise daily or at least 3 times a week for 30-60 minutes doing cardiovascular exercise. Encourage to maintain the best physical and dental hygiene possible.  Consider a  if overweight and/or having difficult in staying active; attempt to keep a schedule that includes an adequate sleep and physical exercise / activities Patient educated on doing regular self testicular exam. Patient  was educated on sexual transmitted disease. Best to abstain from sexual intercourse until he is ready to form a family. Use of condoms may prevent transmission of infections as well as pregnancy.    FOLLOW-UP: Schedule a follow-up visit in 12 months.     2. Class 2 severe obesity due to excess calories with serious comorbidity and body mass index (BMI) of 39.0 to 39.9 in adult ([facility])  Weight loss plan    3. Type 2 diabetes mellitus without complication, without long-term current use of insulin ([facility])  Stable  CPM  Follow up [facility]  Lab:   - Comp Metabolic Panel (14); Future  - Hemoglobin A1C; Future  - TSH W Reflex To Free T4; Future  - Urinalysis with Culture Reflex; Future  - Microalb/Creat Ratio, Random Urine; Future  - PODIATRY - INTERNAL    4. Essential hypertension  Stable  CPM  Follow up [facility]  Lab: CBC With Differential With Platelet; Future    5. Pure hypercholesterolemia  Stable  CPM  Follow up [facility]  Lab: Lipid Panel; Future    6. Migraine with aura and without status migrainosus, not intractable  Doing well  CPM    7. Age-related osteoporosis with current pathological fracture, initial encounter  Referral:   - XR DEXA BONE DENSITOMETRY (CPT=77080); Future    8. Cigarette nicotine dependence without complication  Stop smoking  Referral:  CT LUNG LD SCREENING(CPT=71271); Future    9. Morbid (severe) obesity due to excess calories ([facility])  As above    10. History of CVA (cerebrovascular accident)  Dong well  CPM    11. PVD (peripheral vascular disease) with claudication ([facility])  Stop smoking  Increase walking exercise.    12. History of chronic back pain  CPM  Referred to Physiatrist    13. Encounter for screening mammogram for malignant neoplasm of breast  Referral Mammography    14. Colon cancer screening  Referral:  EVALUATE & TREAT, GASTRO (INTERNAL)    15. Mixed stress and urge urinary incontinence  Referral Urogyne    16. Risk for falls  High risk, precautions given    17. Vitamin D deficiency  Lab:  Vitamin D;  Future    18. Chronic bilateral low back pain with bilateral sciatica  CPM  Referral:  Physiatry Referral - [place]         The patient indicates understanding of these issues and agrees to the plan.  Consult ordered.  Further testing ordered.  Imaging studies ordered.  Lab work ordered.  Reinforced healthy diet, lifestyle, and exercise.      Return in about 4 months (around 3/18/2025).     TRE WATTS MD, 11/18/2024     Supplementary Documentation:   General Health:  In the past six months, have you lost more than 10 pounds without trying?: 1 - Yes  Has your appetite been poor?: No  Type of Diet: Diabetic  How does the patient maintain a good energy level?: Appropriate Exercise;Daily Walks;Stretching  How would you describe your daily physical activity?: Moderate  How would you describe your current health state?: Fair  How do you maintain positive mental well-being?: Puzzles  On a scale of 0 to 10, with 0 being no pain and 10 being severe pain, what is your pain level?: 5 - (Moderate)  In the past six months, have you experienced urine leakage?: 1-Yes  At any time do you feel concerned for the safety/well-being of yourself and/or your children, in your home or elsewhere?: No  Have you had any immunizations at another office such as Influenza, Hepatitis B, Tetanus, or Pneumococcal?: Yes    Health Maintenance   Topic Date Due    Diabetes Care Foot Exam  Never done    MA Annual Health Assessment  Never done    Annual Depression Screening  01/01/2024    Fall Risk Screening (Annual)  01/01/2024    Tobacco Cessation Counseling  Never done    Diabetes Care Dilated Eye Exam  04/04/2024    COVID-19 Vaccine (5 - 2024-25 season) 09/01/2024    Influenza Vaccine (1) 10/01/2024    Diabetes Care A1C  10/18/2024    Diabetes Care: GFR  04/18/2025    Diabetes Care: Microalb/Creat Ratio  04/18/2025    Mammogram  11/14/2025    Colorectal Cancer Screening  08/13/2027    DEXA Scan  Completed    Pneumococcal Vaccine: 65+ Years   Completed    Zoster Vaccines  Completed

## 2024-11-18 NOTE — TELEPHONE ENCOUNTER
Please review.  Protocol failed / Has no protocol.    Marked High Priority, patient states out of medication  Appointment today 11/18/24     Requested Prescriptions   Pending Prescriptions Disp Refills    metFORMIN  MG Oral Tablet 24 Hr 90 tablet 3     Sig: Take 1 tablet (750 mg total) by mouth daily.       Diabetes Medication Protocol Failed - 11/18/2024 10:47 AM        Failed - Last A1C < 7.5 and within past 6 months     Lab Results   Component Value Date    A1C 6.6 (A) 04/18/2024             Passed - In person appointment or virtual visit in the past 6 mos or appointment in next 3 mos     Recent Outpatient Visits              2 weeks ago Lumbar disc herniation with radiculopathy    [facility] Meghan Webb APRN    Office Visit    4 months ago [facility] transit [facility]    Office Visit    6 months ago Facet syndrome, lumbar    [facility] [place], Alex, MD    Office Visit    7 months ago Preoperative clearance    [facility] Keegan Mena MD    Office Visit    8 months ago Cervical spondylosis with radiculopathy    [facility] [place]    Office Visit          Future Appointments         Provider Department Appt Notes    Today Keegan Mena MD [facility]                     Passed - Microalbumin procedure in past 12 months or taking ACE/ARB        Passed - EGFRCR or GFRNAA > 50     GFR Evaluation  EGFRCR: 94 , resulted on 4/18/2024          Passed - GFR in the past 12 months           Future Appointments         Provider Department Appt Notes    Today Keegan Mena MD [facility]           Recent Outpatient Visits              2 weeks ago Lumbar disc herniation with  radiculopathy    [facility] [address], Meghan Enamorado APRN    Office Visit    4 months ago Slow transit constipation    [facility], Meghan Enaomrado APRN    Office Visit    6 months ago Facet syndrome, lumbar    [facility] Behar, Alex, MD    Office Visit    7 months ago Preoperative clearance    [facility], Keegan Butler MD    Office Visit    8 months ago Cervical spondylosis with radiculopathy    [facility] [place] Riya Emanuel DO    Office Visit

## 2024-11-18 NOTE — TELEPHONE ENCOUNTER
Uzbek Language Line: Etien ID number [id number].    Patient called, verified Name and . Relayed Dr. Mena' message below. Patient verbalized understanding and had no further questions at this time.     [facility] [place] 2D+3D SCREEN[place] BILAT (CPT=77067/84111): Result Notes     Nelly Kinney MA  2024  9:51 AM CST       Left message to call back/MyChart.    Nelly Kinney MA  2024  7:55 AM CST       MyChart.    Keegan Mena MD  11/15/2024  4:25 PM CST       Please call patient, the following results are within normal limits:       RECOMMENDATIONS:  ROUTINE MAMMOGRAM AND CLINICAL EVALUATION IN 12 MONTHS.

## 2024-11-19 NOTE — TELEPHONE ENCOUNTER
Yes, please call patient to continue using glucometer as I advised her last visit that her insurance would cover  continuous monitoring.

## 2024-11-21 ENCOUNTER — LAB ENCOUNTER (OUTPATIENT)
Dept: LAB | Facility: HOSPITAL | Age: 69
End: 2024-11-21
Attending: FAMILY MEDICINE
Payer: MEDICARE

## 2024-11-21 DIAGNOSIS — E11.9 TYPE 2 DIABETES MELLITUS WITHOUT COMPLICATION, WITHOUT LONG-TERM CURRENT USE OF INSULIN (HCC): ICD-10-CM

## 2024-11-21 DIAGNOSIS — E55.9 VITAMIN D DEFICIENCY: ICD-10-CM

## 2024-11-21 DIAGNOSIS — E78.00 PURE HYPERCHOLESTEROLEMIA: Chronic | ICD-10-CM

## 2024-11-21 DIAGNOSIS — I10 ESSENTIAL HYPERTENSION: Chronic | ICD-10-CM

## 2024-11-21 LAB
ALBUMIN SERPL-MCNC: 4.3 G/DL (ref 3.2–4.8)
ALBUMIN/GLOB SERPL: 1.4 {RATIO} (ref 1–2)
ALP LIVER SERPL-CCNC: 89 U/L
ALT SERPL-CCNC: 14 U/L
ANION GAP SERPL CALC-SCNC: 6 MMOL/L (ref 0–18)
AST SERPL-CCNC: 17 U/L (ref ?–34)
BASOPHILS # BLD AUTO: 0.06 X10(3) UL (ref 0–0.2)
BASOPHILS NFR BLD AUTO: 0.9 %
BILIRUB SERPL-MCNC: 0.6 MG/DL (ref 0.2–1.1)
BILIRUB UR QL: NEGATIVE
BUN BLD-MCNC: 19 MG/DL (ref 9–23)
BUN/CREAT SERPL: 25.7 (ref 10–20)
CALCIUM BLD-MCNC: 10.2 MG/DL (ref 8.7–10.4)
CHLORIDE SERPL-SCNC: 107 MMOL/L (ref 98–112)
CHOLEST SERPL-MCNC: 160 MG/DL (ref ?–200)
CLARITY UR: CLEAR
CO2 SERPL-SCNC: 28 MMOL/L (ref 21–32)
CREAT BLD-MCNC: 0.74 MG/DL
CREAT UR-SCNC: 69.5 MG/DL
DEPRECATED RDW RBC AUTO: 42.4 FL (ref 35.1–46.3)
EGFRCR SERPLBLD CKD-EPI 2021: 86 ML/MIN/1.73M2 (ref 60–?)
EOSINOPHIL # BLD AUTO: 0.21 X10(3) UL (ref 0–0.7)
EOSINOPHIL NFR BLD AUTO: 3 %
ERYTHROCYTE [DISTWIDTH] IN BLOOD BY AUTOMATED COUNT: 12.7 % (ref 11–15)
EST. AVERAGE GLUCOSE BLD GHB EST-MCNC: 146 MG/DL (ref 68–126)
FASTING PATIENT LIPID ANSWER: YES
FASTING STATUS PATIENT QL REPORTED: YES
GLOBULIN PLAS-MCNC: 3 G/DL (ref 2–3.5)
GLUCOSE BLD-MCNC: 138 MG/DL (ref 70–99)
GLUCOSE UR-MCNC: >1000 MG/DL
HBA1C MFR BLD: 6.7 % (ref ?–5.7)
HCT VFR BLD AUTO: 39.5 %
HDLC SERPL-MCNC: 53 MG/DL (ref 40–59)
HGB BLD-MCNC: 13.1 G/DL
IMM GRANULOCYTES # BLD AUTO: 0.02 X10(3) UL (ref 0–1)
IMM GRANULOCYTES NFR BLD: 0.3 %
KETONES UR-MCNC: NEGATIVE MG/DL
LDLC SERPL CALC-MCNC: 83 MG/DL (ref ?–100)
LEUKOCYTE ESTERASE UR QL STRIP.AUTO: NEGATIVE
LYMPHOCYTES # BLD AUTO: 2.67 X10(3) UL (ref 1–4)
LYMPHOCYTES NFR BLD AUTO: 38.5 %
MCH RBC QN AUTO: 30.2 PG (ref 26–34)
MCHC RBC AUTO-ENTMCNC: 33.2 G/DL (ref 31–37)
MCV RBC AUTO: 91 FL
MICROALBUMIN UR-MCNC: <0.3 MG/DL
MONOCYTES # BLD AUTO: 0.62 X10(3) UL (ref 0.1–1)
MONOCYTES NFR BLD AUTO: 8.9 %
NEUTROPHILS # BLD AUTO: 3.35 X10 (3) UL (ref 1.5–7.7)
NEUTROPHILS # BLD AUTO: 3.35 X10(3) UL (ref 1.5–7.7)
NEUTROPHILS NFR BLD AUTO: 48.4 %
NITRITE UR QL STRIP.AUTO: NEGATIVE
NONHDLC SERPL-MCNC: 107 MG/DL (ref ?–130)
OSMOLALITY SERPL CALC.SUM OF ELEC: 296 MOSM/KG (ref 275–295)
PH UR: 5.5 [PH] (ref 5–8)
PLATELET # BLD AUTO: 246 10(3)UL (ref 150–450)
POTASSIUM SERPL-SCNC: 3.8 MMOL/L (ref 3.5–5.1)
PROT SERPL-MCNC: 7.3 G/DL (ref 5.7–8.2)
PROT UR-MCNC: NEGATIVE MG/DL
RBC # BLD AUTO: 4.34 X10(6)UL
SODIUM SERPL-SCNC: 141 MMOL/L (ref 136–145)
SP GR UR STRIP: 1.03 (ref 1–1.03)
TRIGL SERPL-MCNC: 138 MG/DL (ref 30–149)
TSI SER-ACNC: 1.29 UIU/ML (ref 0.55–4.78)
UROBILINOGEN UR STRIP-ACNC: NORMAL
VIT D+METAB SERPL-MCNC: 57.4 NG/ML (ref 30–100)
VLDLC SERPL CALC-MCNC: 22 MG/DL (ref 0–30)
WBC # BLD AUTO: 6.9 X10(3) UL (ref 4–11)

## 2024-11-21 PROCEDURE — 82306 VITAMIN D 25 HYDROXY: CPT

## 2024-11-21 PROCEDURE — 36415 COLL VENOUS BLD VENIPUNCTURE: CPT

## 2024-11-21 PROCEDURE — 83036 HEMOGLOBIN GLYCOSYLATED A1C: CPT

## 2024-11-21 PROCEDURE — 80053 COMPREHEN METABOLIC PANEL: CPT

## 2024-11-21 PROCEDURE — 82043 UR ALBUMIN QUANTITATIVE: CPT

## 2024-11-21 PROCEDURE — 80061 LIPID PANEL: CPT

## 2024-11-21 PROCEDURE — 84443 ASSAY THYROID STIM HORMONE: CPT

## 2024-11-21 PROCEDURE — 82570 ASSAY OF URINE CREATININE: CPT

## 2024-11-21 PROCEDURE — 85025 COMPLETE CBC W/AUTO DIFF WBC: CPT

## 2024-11-21 PROCEDURE — 81001 URINALYSIS AUTO W/SCOPE: CPT

## 2024-12-09 ENCOUNTER — PATIENT MESSAGE (OUTPATIENT)
Dept: CASE MANAGEMENT | Age: 69
End: 2024-12-09

## 2024-12-09 ENCOUNTER — TELEPHONE (OUTPATIENT)
Dept: CASE MANAGEMENT | Age: 69
End: 2024-12-09

## 2024-12-09 NOTE — TELEPHONE ENCOUNTER
CT Lung         Status: DENIED        Reference number [id number]     A copy of the denial letter is filed under the MEDIA tab, reference for complete details. You may reach out to Alberto at [phone] to discuss decision.     Please reach out to patient with plan of care.      Thank you

## 2024-12-10 ENCOUNTER — TELEPHONE (OUTPATIENT)
Dept: FAMILY MEDICINE CLINIC | Facility: CLINIC | Age: 69
End: 2024-12-10

## 2024-12-10 NOTE — TELEPHONE ENCOUNTER
Patient is requesting a call back from provider.  Patient mentions she went to her lung screening appointment this morning and was advised it was denied and advised to contact her PCP for reason.    Patient mentions she did not receive a phone call advising the denial and was at the appointment this morning.  Missed work day and transportation issues were involved today.    Patient advises to please call back after 9:15 am today.

## 2024-12-10 NOTE — TELEPHONE ENCOUNTER
Addressed. See telephone encounter 12/9 - Prior Authorization (Denial). No further action needed. Closing encounter.

## 2024-12-26 ENCOUNTER — HOSPITAL ENCOUNTER (OUTPATIENT)
Dept: BONE DENSITY | Facility: HOSPITAL | Age: 69
Discharge: HOME | End: 2024-12-26
Attending: FAMILY MEDICINE
Payer: MEDICARE

## 2024-12-26 DIAGNOSIS — M80.00XA AGE-RELATED OSTEOPOROSIS WITH CURRENT PATHOLOGICAL FRACTURE, INITIAL ENCOUNTER: ICD-10-CM

## 2024-12-26 PROCEDURE — 77080 DXA BONE DENSITY AXIAL: CPT | Performed by: FAMILY MEDICINE

## 2024-12-30 ENCOUNTER — HOSPITAL ENCOUNTER (OUTPATIENT)
Dept: CT IMAGING | Facility: HOSPITAL | Age: 69
Discharge: HOME | End: 2024-12-30
Attending: FAMILY MEDICINE
Payer: MEDICARE

## 2024-12-30 DIAGNOSIS — F17.210 CIGARETTE NICOTINE DEPENDENCE WITHOUT COMPLICATION: ICD-10-CM

## 2024-12-30 DIAGNOSIS — M80.00XA AGE-RELATED OSTEOPOROSIS WITH CURRENT PATHOLOGICAL FRACTURE, INITIAL ENCOUNTER: ICD-10-CM

## 2024-12-30 PROCEDURE — 71271 CT THORAX LUNG CANCER SCR C-: CPT | Performed by: FAMILY MEDICINE

## 2024-12-30 RX ORDER — ASPIRIN 81 MG/1
81 TABLET, CHEWABLE ORAL DAILY
Qty: 90 TABLET | Refills: 3 | Status: SHIPPED | OUTPATIENT
Start: 2024-12-30 | End: 2025-12-25

## 2024-12-30 RX ORDER — ALENDRONATE SODIUM 70 MG/1
70 TABLET ORAL
Qty: 12 TABLET | Refills: 3 | Status: SHIPPED | OUTPATIENT
Start: 2024-12-30

## 2025-01-16 ENCOUNTER — OFFICE VISIT (OUTPATIENT)
Dept: PODIATRY CLINIC | Facility: CLINIC | Age: 70
End: 2025-01-16

## 2025-01-16 DIAGNOSIS — B35.1 ONYCHOMYCOSIS: ICD-10-CM

## 2025-01-16 DIAGNOSIS — E11.42 TYPE 2 DIABETES MELLITUS WITH DIABETIC POLYNEUROPATHY, WITHOUT LONG-TERM CURRENT USE OF INSULIN (HCC): Primary | ICD-10-CM

## 2025-01-16 PROCEDURE — 99203 OFFICE O/P NEW LOW 30 MIN: CPT | Performed by: STUDENT IN AN ORGANIZED HEALTH CARE EDUCATION/TRAINING PROGRAM

## 2025-01-16 PROCEDURE — 1159F MED LIST DOCD IN RCRD: CPT | Performed by: STUDENT IN AN ORGANIZED HEALTH CARE EDUCATION/TRAINING PROGRAM

## 2025-01-16 PROCEDURE — 1126F AMNT PAIN NOTED NONE PRSNT: CPT | Performed by: STUDENT IN AN ORGANIZED HEALTH CARE EDUCATION/TRAINING PROGRAM

## 2025-01-16 RX ORDER — CLOTRIMAZOLE 1 G/ML
1 SOLUTION TOPICAL 2 TIMES DAILY
Qty: 60 ML | Refills: 3 | Status: SHIPPED | OUTPATIENT
Start: 2025-01-16

## 2025-01-16 RX ORDER — GABAPENTIN 100 MG/1
100 CAPSULE ORAL 3 TIMES DAILY
Qty: 270 CAPSULE | Refills: 0 | Status: SHIPPED | OUTPATIENT
Start: 2025-01-16 | End: 2025-04-16

## 2025-01-16 NOTE — PROGRESS NOTES
[facility] Podiatry  Progress Note      Page Pritchett is a 69 year old female.   Chief Complaint   Patient presents with    Diabetic Foot Care     Consult - last [id number]=5.7 from 11/21/24 and LOV with PCP Dr Mena was 11/18/24 - has fungal infection on all the toes - she needs her toenails trimmed - has numbness in her toes - this AM she did not checked her BS             HPI:     Patient is a pleasant 69-year-old Cymraes-speaking female presents to clinic accompanied by her daughter who is providing Cymraes translation.  Patient admits to numbness to bilateral foot digits.  Denies taking any medication for the neuropathy.  Admits that her toenails especially bilateral first and second are thickened and at times is incurvated into her skin.  Denies any pedal injuries or trauma.  Denies any signs of infection.    Allergies: Lisinopril, Codeine, and Hydrocodone-acetaminophen    Current Outpatient Medications   Medication Sig Dispense Refill    clotrimazole 1 % External Solution Apply 1 Application topically 2 (two) times daily. 60 mL 3    gabapentin 100 MG Oral Cap Take 1 capsule (100 mg total) by mouth 3 (three) times daily. 270 capsule 0    aspirin (ASPIRIN 81) 81 MG Oral Chew Tab Chew 1 tablet (81 mg total) by mouth daily. 90 tablet 3    alendronate 70 MG Oral Tab Take 1 tablet (70 mg total) by mouth every 7 days. 12 tablet 3    metFORMIN  MG Oral Tablet 24 Hr Take 1 tablet (750 mg total) by mouth daily. 90 tablet 3    Continuous Glucose Sensor (FREESTYLE KANDI 14 DAY SENSOR) Does not apply Misc Use QID 2 each 11    cyclobenzaprine 5 MG Oral Tab Take 1 tablet (5 mg total) by mouth 3 (three) times daily as needed for Muscle spasms. 30 tablet 0    Losartan Potassium-HCTZ 100-12.5 MG Oral Tab Take 1 tablet by mouth daily. 90 tablet 3    atorvastatin 10 MG Oral Tab Take 1 tablet (10 mg total) by mouth daily. 90 tablet 3    ONETOUCH ULTRA In Vitro Strip daily.        Past Medical History:    Angiomyolipoma  of right kidney    Chronic low back pain    Diabetes (HCC)    Esophageal reflux    Gastritis    High blood pressure    High cholesterol    Osteoarthritis    Psoriasis    Stroke (HCC)    \"small stroke\" many years ago    Visual impairment    readers      Past Surgical History:   Procedure Laterality Date          Cholecystectomy      Spine surgery procedure unlisted      Tonsillectomy      Total knee replacement Right 2020    no associated bleeding complications      Family History   Problem Relation Age of Onset    Diabetes Mother         Diabetes Type 2    Other (Other) Mother     Prostate Cancer Father     Other (Other) Sister         Gallbladder disease    Hypertension Other         Family h/o    Other (Other) Other         Family h    Bleeding Disorders Neg     DVT/VTE Neg     Breast Cancer Neg     Ovarian Cancer Neg       Social History     Socioeconomic History    Marital status:    Tobacco Use    Smoking status: Light Smoker     Current packs/day: 0.25     Average packs/day: 0.3 packs/day for 47.0 years (11.8 ttl pk-yrs)     Types: Cigarettes    Smokeless tobacco: Never   Vaping Use    Vaping status: Never Used   Substance and Sexual Activity    Alcohol use: No     Alcohol/week: 0.0 standard drinks of alcohol    Drug use: Never   Other Topics Concern    Caffeine Concern Yes     Comment: Soda daily    Exercise No           REVIEW OF SYSTEMS:     Denies nause, fever, chills  No calf pain  Denies chest pain or SOB      EXAM:   There were no vitals taken for this visit.  GENERAL: well developed, well nourished, in no apparent distress  EXTREMITIES:   1. Integument: Normal skin temperature and turgor.  Toenails x 10 are within normal limits  2. Vascular: Dorsalis pedis two out of four bilateral and posterior tibial pulses two out of   four bilateral, capillary refill normal.   3. Musculoskeletal: All muscle groups are graded 5 out of 5 in the foot and ankle.   4. Neurological: Normal sharp  dull sensation; reflexes normal.             ASSESSMENT AND PLAN:   Diagnoses and all orders for this visit:    Type 2 diabetes mellitus with diabetic polyneuropathy, without long-term current use of insulin (HCC)  -     DME - External    Onychomycosis  -     DME - External    Other orders  -     clotrimazole 1 % External Solution; Apply 1 Application topically 2 (two) times daily.  -     gabapentin 100 MG Oral Cap; Take 1 capsule (100 mg total) by mouth 3 (three) times daily.        Plan:       -Patient examined, chart history reviewed.  -Discussed importance of proper pedal hygiene, regular foot checks, and tight glucose control.  -Advised patient to avoid trimming her toenails very short to prevent any ingrown toenails.  -Ambulate with supportive shoes and inserts and avoid walking barefoot.  -Educated patient on acute signs of infection advised patient to seek immediate medical attention if symptoms arise.  -Return to clinic for diabetic nail care      The patient indicates understanding of these issues and agrees to the plan.        Meghan Zepeda DPM

## 2025-01-21 ENCOUNTER — OFFICE VISIT (OUTPATIENT)
Facility: CLINIC | Age: 70
End: 2025-01-21

## 2025-01-21 ENCOUNTER — TELEPHONE (OUTPATIENT)
Facility: CLINIC | Age: 70
End: 2025-01-21

## 2025-01-21 VITALS
SYSTOLIC BLOOD PRESSURE: 134 MMHG | WEIGHT: 178 LBS | DIASTOLIC BLOOD PRESSURE: 76 MMHG | HEART RATE: 67 BPM | BODY MASS INDEX: 30.39 KG/M2 | HEIGHT: 64 IN

## 2025-01-21 DIAGNOSIS — K59.00 CONSTIPATION, UNSPECIFIED CONSTIPATION TYPE: Primary | ICD-10-CM

## 2025-01-21 DIAGNOSIS — R19.4 CHANGE IN BOWEL HABITS: ICD-10-CM

## 2025-01-21 DIAGNOSIS — K59.00 CONSTIPATION, UNSPECIFIED CONSTIPATION TYPE: ICD-10-CM

## 2025-01-21 DIAGNOSIS — R19.4 CHANGE IN BOWEL HABITS: Primary | ICD-10-CM

## 2025-01-21 PROCEDURE — 3075F SYST BP GE 130 - 139MM HG: CPT | Performed by: NURSE PRACTITIONER

## 2025-01-21 PROCEDURE — 1125F AMNT PAIN NOTED PAIN PRSNT: CPT | Performed by: NURSE PRACTITIONER

## 2025-01-21 PROCEDURE — 1159F MED LIST DOCD IN RCRD: CPT | Performed by: NURSE PRACTITIONER

## 2025-01-21 PROCEDURE — 99214 OFFICE O/P EST MOD 30 MIN: CPT | Performed by: NURSE PRACTITIONER

## 2025-01-21 PROCEDURE — 3008F BODY MASS INDEX DOCD: CPT | Performed by: NURSE PRACTITIONER

## 2025-01-21 PROCEDURE — 3078F DIAST BP <80 MM HG: CPT | Performed by: NURSE PRACTITIONER

## 2025-01-21 PROCEDURE — 1160F RVW MEDS BY RX/DR IN RCRD: CPT | Performed by: NURSE PRACTITIONER

## 2025-01-21 RX ORDER — EMPAGLIFLOZIN 25 MG/1
TABLET, FILM COATED ORAL
COMMUNITY
Start: 2025-01-15

## 2025-01-21 NOTE — H&P
[facility] - Gastroenterology                                                                                                  Clinic History and Physical     Chief Complaint   Patient presents with    Colonoscopy Screening    Constipation       Requesting physician or provider: TRE WATTS MD    HPI:   Page Pritchett is a 69 year old year-old female with history of CVA,  PVD, hypertension, hyperlipidemia, migraines, cervical stenosis, DDD,. The patient presents for colon cancer screening evaluation.  Upper sorbian translation via language line.     Has has some severe constipation for a few weeks. States at times she has to strain to have a bowel movement. Was going every day previously.     Patient is here today as a referral from her PCP for evaluation prior to undergoing colonoscopy for CRC screening. Patient denies any GI symptoms of nausea, vomiting, heartburn, dyspepsia, dysphagia, hematemesis, abdominal pain, diarrhea, hematochezia, or melena.  Additionally there is no unintentional weight loss.      Last colonoscopy: none  Last EGD:  none    NSAIDS: none  Tobacco: quit 2 months ago  Alcohol: none  Marijuana: none  Illicit drugs: none    FH GI malignancy:  none    No history of adverse reaction to sedation  No MARGARET  No anticoagulants/antiplatelet  No pacemaker/defibrillator  No pain medications and/or sleep aides    Wt Readings from Last 6 Encounters:   01/21/25 178 lb (80.7 kg)   11/18/24 173 lb (78.5 kg)   10/30/24 177 lb 3.2 oz (80.4 kg)   07/17/24 173 lb (78.5 kg)   04/29/24 171 lb (77.6 kg)   04/18/24 171 lb 9.6 oz (77.8 kg)          History, Medications, Allergies, ROS:      Past Medical History:    Angiomyolipoma of right kidney    Chronic low back pain    Diabetes (HCC)    Esophageal reflux    Gastritis    High blood pressure    High cholesterol    Osteoarthritis    Psoriasis    Stroke (HCC)    \"small stroke\" many years ago    Visual impairment    readers      Past Surgical History:   Procedure  Laterality Date          Cholecystectomy  1985    Egd  2020    Spine surgery procedure unlisted      Tonsillectomy  1992    Total knee replacement Right 2020    no associated bleeding complications      Family Hx:   Family History   Problem Relation Age of Onset    Diabetes Mother         Diabetes Type 2    Other (Other) Mother     Prostate Cancer Father     Other (Other) Sister         Gallbladder disease    Hypertension Other         Family h/o    Other (Other) Other         Family h    Bleeding Disorders Neg     DVT/VTE Neg     Breast Cancer Neg     Ovarian Cancer Neg       Social History:   Social History     Socioeconomic History    Marital status:    Tobacco Use    Smoking status: Light Smoker     Current packs/day: 0.25     Average packs/day: 0.3 packs/day for 47.0 years (11.8 ttl pk-yrs)     Types: Cigarettes    Smokeless tobacco: Never   Vaping Use    Vaping status: Never Used   Substance and Sexual Activity    Alcohol use: No     Alcohol/week: 0.0 standard drinks of alcohol    Drug use: Never   Other Topics Concern    Caffeine Concern Yes     Comment: Soda daily    Exercise No   Social History Narrative    \"Dulia\" is windowed but has a long term boyfriend for the last 4 yrs. Mother of 2 adult children with 1 granddaughter. She formerly worked from [employer] in [place], [place]. Patient lives alone in [place], [place].            The patient does not use an assistive device..      The patient does live in a home with stairs.     Social Drivers of Health     Food Insecurity: No Food Insecurity (10/6/2023)    Food Insecurity     Food Insecurity: Never true   Transportation Needs: No Transportation Needs (10/6/2023)    Transportation Needs     Lack of Transportation: No   Housing Stability: Low Risk  (10/6/2023)    Housing Stability     Housing Instability: No        Medications (Active prior to today's visit):  Current Outpatient Medications   Medication Sig Dispense Refill     JARDIANCE 25 MG Oral Tab       aspirin (ASPIRIN 81) 81 MG Oral Chew Tab Chew 1 tablet (81 mg total) by mouth daily. 90 tablet 3    alendronate 70 MG Oral Tab Take 1 tablet (70 mg total) by mouth every 7 days. 12 tablet 3    metFORMIN  MG Oral Tablet 24 Hr Take 1 tablet (750 mg total) by mouth daily. 90 tablet 3    cyclobenzaprine 5 MG Oral Tab Take 1 tablet (5 mg total) by mouth 3 (three) times daily as needed for Muscle spasms. 30 tablet 0    Losartan Potassium-HCTZ 100-12.5 MG Oral Tab Take 1 tablet by mouth daily. 90 tablet 3    atorvastatin 10 MG Oral Tab Take 1 tablet (10 mg total) by mouth daily. 90 tablet 3    clotrimazole 1 % External Solution Apply 1 Application topically 2 (two) times daily. (Patient not taking: Reported on 1/21/2025) 60 mL 3    gabapentin 100 MG Oral Cap Take 1 capsule (100 mg total) by mouth 3 (three) times daily. 270 capsule 0    Continuous Glucose Sensor (Mc4 KANDI 14 DAY SENSOR) Does not apply Misc Use QID 2 each 11    ONETOUCH ULTRA In Vitro Strip daily.         Allergies:  Allergies[1]    ROS:   CONSTITUTIONAL: negative for fevers, chills, sweats  EYES Negative for scleral icterus or redness, and diplopia  HEENT: Negative for hoarseness  RESPIRATORY: Negative for cough and severe shortness of breath  CARDIOVASCULAR: Negative for crushing sub-sternal chest pain  GASTROINTESTINAL: See HPI  GENITOURINARY: Negative for dysuria  MUSCULOSKELETAL: Negative for arthralgias and myalgias  SKIN: Negative for jaundice, rash or pruritus  NEUROLOGICAL: Negative for dizziness and headaches  BEHAVIOR/PSYCH: Negative for psychotic behavior      PHYSICAL EXAM:   Blood pressure 134/76, pulse 67, height 5' 4\" (1.626 m), weight 178 lb (80.7 kg), not currently breastfeeding.    GEN: Alert, no acute distress, well-nourished   ABDOMEN: Soft, symmetrical, non-tender without distention or guarding. No scars or lesions. Aorta is without bruit or visible pulsation. Umbilicus is midline without  herniation. Normoactive bowel sounds are present, No masses, hepatomegaly or splenomegaly noted.  MSK: No erythema, no warmth, no swelling of joints  SKIN: No jaundice, no erythema, no rashes, no lesions  HEMATOLOGIC: No bleeding, no bruising  NEURO: Alert and interactive, MONSIVAIS  PSYCH: appropriate mood & affect    Labs/Imaging:     Patient's labs and imaging were reviewed and discussed with patient today.    .  ASSESSMENT/PLAN:   Page Pritchett is a 69 year old year-old female with history of CVA,  PVD, hypertension, hyperlipidemia, migraines, cervical stenosis, DDD,. The patient presents for colon cancer screening evaluation.    #constipation  Trial Miralax daily. Follow up before colonoscopy if no improvement in symptoms. Leaving for a trip to [place] soon.     #colorectal cancer screening: patient is considered average risk for colon cancer (No immediate family hx of colon cancer) and it is appropriate to proceed with screening colonoscopy. Patient is currently asymptomatic and denies diarrhea, hematochezia, thin-stools or weight loss. We discussed risks/benefits/alternatives to procedure, including stool testing, they want to proceed with colonoscopy.  No anemia noted on blood work.    #diabetes type 2  -controlled. Followed by PCP for management. No recent change to medications. Continue to follow with specialist. Advised on antihyperglycemic agent modification in the context of endoscopic procedure preparation.  The patient has medical conditions including diabetes and/or obesity which impacts the clinical decision making for procedural planning.        -Anti-platelets and anti-coagulants: asa 81mg   -Diabetes meds: jardiance, metformin     Patient Instructions   Recomendaciones:  -Comience Miralax 17 g por día, ajuste hacia arriba o hacia abajo para obtener la frecuencia ideal   -Aumentar la ingesta de agua a 64 onzas de agua por día  -Aumentar la fibra a 20-30 g por día con frutas, verduras y cereales  integrales  -Aumentar el ejercicio a 150 minutos/semana  -No ignorar las ganas de defecar  -Iniciar el uso de Squatty  -Evitar los azúcares artificiales  -Si los hábitos intestinales cambian o el estreñimiento empeora, llame a nuestra oficina lo antes posible   -seguimiento con Johnathon en 6 semanas     Para el procedimiento:  -Compre un laxante dulcolax de venta eklly y tome trena tableta al día kim 3 días antes de beber la preparación intestinal.    . CAMBIOS EN LA MEDICACIÓN ANTES DEL PROCEDIMIENTO     *MANTENER la metformina el día anterior y el día del procedimiento   * MANTENGA la jardiance kim 4 días antes del procedimiento     7 días ANTES del procedimiento: *HOLD Píldoras de jasmin, suplementos herbales, multivitamínicos o medicamentos para bajar de peso/dieta (es decir, fentermina / Vyvanse) o prasugrel (eficaz antiplaquetario)      -Buy over the counter dulcolax laxative, and take one tablet daily for 3 days prior to drinking the bowel prep    1. Schedule colonoscopy with [facility] Endoscopist  Diagnosis: change in bowel habits, constipation  Sedation: MAC  Prep: split dose golytely    2. MEDICATION CHANGES PRIOR TO PROCEDURE    *HOLD metformin day before & day of procedure  *HOLD jardiance for 4 days prior to procedure    7 days BEFORE procedure: *HOLD Iron pills, herbal supplements, multivitamins, or weight loss/diet medications (i.e.Phentermine/Vyvanse) or prasugrel (effient- antiplatelet)    DO NOT TAKE: Any form of alcohol, recreational drugs and any forms of erectile dysfunction medications 24 hours prior to procedure.      3.  bowel prep from pharmacy   You can pick the bowel prep up now and store in a cool, dry place in your home until your scheduled bowel prep start date.    4. Read all bowel prep instructions carefully. Bowel prep instructions can also be found online at:  www.health.org/giprep     5. AVOID seeds, nuts, popcorn, raw fruits and vegetables for 5 days before  procedure    6. If you start any NEW medication after your visit today, please notify us. Certain medications (like iron or weight loss medications) will need to be held before the procedure, or the procedure cannot be performed safely.       Endoscopy risk/benefit discussion: I have thoroughly discussed the risks, benefits, and alternatives of endoscopic evaluation with the patient, who demonstrated understanding. This includes the potential risks of bleeding, infection, pain, anesthesia complications, and perforation, which may result in prolonged hospitalization or surgical intervention. All of the patient’s questions were addressed to their satisfaction. The patient has chosen to proceed with the endoscopic procedure, including any necessary interventions such as polypectomy, biopsy, control of bleeding.      Orders This Visit:  No orders of the defined types were placed in this encounter.      Meds This Visit:  Requested Prescriptions      No prescriptions requested or ordered in this encounter       Imaging & Referrals:  None       MARIA M Alvarado    [facility]  1/21/2025               [1]   Allergies  Allergen Reactions    Lisinopril SWELLING and TONGUE SWELLING    Codeine ANXIETY     Caused pimples    Hydrocodone-Acetaminophen ANXIETY

## 2025-01-21 NOTE — TELEPHONE ENCOUNTER
Scheduled for:  COLONOSCOPY [id number]  Provider Name:  DR ACOSTA  Date:  2/13/2025  Location:    [facility]  Sedation:  MAC  Time:  1230PM (pt is aware that ENDO will call the day before to confirm arrival time)  Prep:  GOLYTELY  Meds/Allergies Reconciled?:  FRANCIS BENSON  Diagnosis with codes:  CHANGE IN BOWEL HABITS R19.4, CONSTIPATION K59.00  Was patient informed to call insurance with codes (Y/N):  Yes, I confirmed the insurance with the patient.   Referral sent?:  N/A  [facility] notified?:  I sent an electronic request to Endo Scheduling and received a confirmation today.      Medication Orders:  This patient verbally confirmed that she is not taking:   Iron, blood thinners,she taking BP meds, and is a diabetic   Not latex allergy, Not PCN allergy and does not have a pacemaker     Misc Orders:    HOLD METFORMIN DAY BEFORE AND DAY OF PROCEDURE  JARDIANCE HOLD FOR 4 DAYS PRIOR TO PROCEDURE     Further instructions given by staff:

## 2025-01-21 NOTE — PATIENT INSTRUCTIONS
Recomendaciones:  -Comience Miralax 17 g por día, ajuste hacia arriba o hacia abajo para obtener la frecuencia ideal   -Aumentar la ingesta de agua a 64 onzas de agua por día  -Aumentar la fibra a 20-30 g por día con frutas, verduras y cereales integrales  -Aumentar el ejercicio a 150 minutos/semana  -No ignorar las ganas de defecar  -Iniciar el uso de Squatty  -Evitar los azúcares artificiales  -Si los hábitos intestinales cambian o el estreñimiento empeora, llame a nuestra oficina lo antes posible   -seguimiento con Johnathon en 6 semanas     Para el procedimiento:  -Compre un laxante dulcolax de venta kelly y tome trena tableta al día kim 3 días antes de beber la preparación intestinal.    . CAMBIOS EN LA MEDICACIÓN ANTES DEL PROCEDIMIENTO     *MANTENER la metformina el día anterior y el día del procedimiento   * MANTENGA la jardiance kim 4 días antes del procedimiento     7 días ANTES del procedimiento: *HOLD Píldoras de jasmin, suplementos herbales, multivitamínicos o medicamentos para bajar de peso/dieta (es decir, fentermina / Vyvanse) o prasugrel (eficaz antiplaquetario)      -Buy over the counter dulcolax laxative, and take one tablet daily for 3 days prior to drinking the bowel prep    1. Schedule colonoscopy with General Pool Endoscopist  Diagnosis: change in bowel habits, constipation  Sedation: MAC  Prep: split dose golytely    2. MEDICATION CHANGES PRIOR TO PROCEDURE    *HOLD metformin day before & day of procedure  *HOLD jardiance for 4 days prior to procedure    7 days BEFORE procedure: *HOLD Iron pills, herbal supplements, multivitamins, or weight loss/diet medications (i.e.Phentermine/Vyvanse) or prasugrel (effient- antiplatelet)    DO NOT TAKE: Any form of alcohol, recreational drugs and any forms of erectile dysfunction medications 24 hours prior to procedure.      3.  bowel prep from pharmacy   You can pick the bowel prep up now and store in a cool, dry place in your home until your  scheduled bowel prep start date.    4. Read all bowel prep instructions carefully. Bowel prep instructions can also be found online at:  www.health.org/giprep     5. AVOID seeds, nuts, popcorn, raw fruits and vegetables for 5 days before procedure    6. If you start any NEW medication after your visit today, please notify us. Certain medications (like iron or weight loss medications) will need to be held before the procedure, or the procedure cannot be performed safely.

## 2025-02-06 ENCOUNTER — TELEPHONE (OUTPATIENT)
Facility: CLINIC | Age: 70
End: 2025-02-06

## 2025-02-06 NOTE — TELEPHONE ENCOUNTER
Received fax from PAT: patient and family requesting bowel prep to pharmacy and review instructions Greenlandic and english.

## 2025-02-06 NOTE — TELEPHONE ENCOUNTER
Bowel prep sent per protocol (See TE from 1/21/2025-Golytely)    [employer] Interpretor #[id number] used to call the patient    Attempted to call the patient, no answer    Left message for patient to call back    Attempted to call the patient's daughter Coreen (OK per MYRIAM)    Automated voice states \"phone number is no longer in service\"    Attempted to call the patient's other daughter Tiarra (OK per MYRIAM)    No answer, left message to call back

## 2025-02-11 RX ORDER — ERGOCALCIFEROL 1.25 MG/1
50000 CAPSULE, LIQUID FILLED ORAL WEEKLY
Qty: 12 CAPSULE | Refills: 0 | Status: SHIPPED | OUTPATIENT
Start: 2025-02-11

## 2025-02-11 NOTE — TELEPHONE ENCOUNTER
Please review .protocol failed/ has no protocol    Last Office Visit: 11/18/2024  No pending lab Order for Vitamin D. Please advise if refill is appropriate.      Component  Ref Range & Units 11/21/24  6:58 AM   Vitamin D, 25OH, Total  30.0 - 100.0 ng/mL 57.4        Requested Prescriptions     Pending Prescriptions Disp Refills    ergocalciferol 1.25 MG (75334 UT) Oral Cap 12 capsule 0     Sig: Take 1 capsule (50,000 Units total) by mouth once a week.

## 2025-02-13 ENCOUNTER — ANESTHESIA (OUTPATIENT)
Dept: ENDOSCOPY | Age: 70
End: 2025-02-13
Payer: MEDICARE

## 2025-02-13 ENCOUNTER — HOSPITAL ENCOUNTER (OUTPATIENT)
Age: 70
Setting detail: HOSPITAL OUTPATIENT SURGERY
Discharge: HOME | End: 2025-02-13
Attending: INTERNAL MEDICINE | Admitting: INTERNAL MEDICINE
Payer: MEDICARE

## 2025-02-13 ENCOUNTER — ANESTHESIA EVENT (OUTPATIENT)
Dept: ENDOSCOPY | Age: 70
End: 2025-02-13
Payer: MEDICARE

## 2025-02-13 VITALS
SYSTOLIC BLOOD PRESSURE: 138 MMHG | RESPIRATION RATE: 16 BRPM | BODY MASS INDEX: 30.39 KG/M2 | DIASTOLIC BLOOD PRESSURE: 71 MMHG | HEART RATE: 56 BPM | HEIGHT: 64 IN | WEIGHT: 178 LBS | OXYGEN SATURATION: 99 %

## 2025-02-13 DIAGNOSIS — K59.00 CONSTIPATION, UNSPECIFIED CONSTIPATION TYPE: ICD-10-CM

## 2025-02-13 DIAGNOSIS — R19.4 CHANGE IN BOWEL HABITS: ICD-10-CM

## 2025-02-13 PROBLEM — K62.1 RECTAL POLYP: Status: ACTIVE | Noted: 2025-02-13

## 2025-02-13 LAB — GLUCOSE BLDC GLUCOMTR-MCNC: 106 MG/DL (ref 70–99)

## 2025-02-13 PROCEDURE — 45331 SIGMOIDOSCOPY AND BIOPSY: CPT | Performed by: INTERNAL MEDICINE

## 2025-02-13 PROCEDURE — 99070 SPECIAL SUPPLIES PHYS/QHP: CPT | Performed by: INTERNAL MEDICINE

## 2025-02-13 RX ORDER — NICOTINE POLACRILEX 4 MG
15 LOZENGE BUCCAL
Status: DISCONTINUED | OUTPATIENT
Start: 2025-02-13 | End: 2025-02-13

## 2025-02-13 RX ORDER — SODIUM CHLORIDE, SODIUM LACTATE, POTASSIUM CHLORIDE, CALCIUM CHLORIDE 600; 310; 30; 20 MG/100ML; MG/100ML; MG/100ML; MG/100ML
INJECTION, SOLUTION INTRAVENOUS CONTINUOUS
Status: DISCONTINUED | OUTPATIENT
Start: 2025-02-13 | End: 2025-02-13

## 2025-02-13 RX ORDER — METOCLOPRAMIDE HYDROCHLORIDE 5 MG/ML
10 INJECTION INTRAMUSCULAR; INTRAVENOUS EVERY 8 HOURS PRN
Status: DISCONTINUED | OUTPATIENT
Start: 2025-02-13 | End: 2025-02-13

## 2025-02-13 RX ORDER — DEXTROSE MONOHYDRATE 25 G/50ML
50 INJECTION, SOLUTION INTRAVENOUS
Status: DISCONTINUED | OUTPATIENT
Start: 2025-02-13 | End: 2025-02-13

## 2025-02-13 RX ORDER — NALOXONE HYDROCHLORIDE 0.4 MG/ML
0.08 INJECTION, SOLUTION INTRAMUSCULAR; INTRAVENOUS; SUBCUTANEOUS AS NEEDED
Status: DISCONTINUED | OUTPATIENT
Start: 2025-02-13 | End: 2025-02-13

## 2025-02-13 RX ORDER — ONDANSETRON 2 MG/ML
4 INJECTION INTRAMUSCULAR; INTRAVENOUS EVERY 6 HOURS PRN
Status: DISCONTINUED | OUTPATIENT
Start: 2025-02-13 | End: 2025-02-13

## 2025-02-13 RX ORDER — LIDOCAINE HYDROCHLORIDE 10 MG/ML
INJECTION, SOLUTION EPIDURAL; INFILTRATION; INTRACAUDAL; PERINEURAL AS NEEDED
Status: DISCONTINUED | OUTPATIENT
Start: 2025-02-13 | End: 2025-02-13 | Stop reason: SURG

## 2025-02-13 RX ORDER — NICOTINE POLACRILEX 4 MG
30 LOZENGE BUCCAL
Status: DISCONTINUED | OUTPATIENT
Start: 2025-02-13 | End: 2025-02-13

## 2025-02-13 RX ADMIN — SODIUM CHLORIDE, SODIUM LACTATE, POTASSIUM CHLORIDE, CALCIUM CHLORIDE: 600; 310; 30; 20 INJECTION, SOLUTION INTRAVENOUS at 13:28:00

## 2025-02-13 RX ADMIN — LIDOCAINE HYDROCHLORIDE 50 MG: 10 INJECTION, SOLUTION EPIDURAL; INFILTRATION; INTRACAUDAL; PERINEURAL at 13:28:00

## 2025-02-13 RX ADMIN — SODIUM CHLORIDE, SODIUM LACTATE, POTASSIUM CHLORIDE, CALCIUM CHLORIDE: 600; 310; 30; 20 INJECTION, SOLUTION INTRAVENOUS at 13:51:00

## 2025-02-13 RX ADMIN — SODIUM CHLORIDE, SODIUM LACTATE, POTASSIUM CHLORIDE, CALCIUM CHLORIDE: 600; 310; 30; 20 INJECTION, SOLUTION INTRAVENOUS at 11:58:00

## 2025-02-13 NOTE — ANESTHESIA POSTPROCEDURE EVALUATION
Patient: Page Pritchett    Procedure Summary       Date: 02/13/25 Room / Location: [facility] ENDOSCOPY 01 / [facility] ENDO    Anesthesia Start: 1328 Anesthesia Stop: 1357    Procedure: FLEXIBLE SIGMOIDOSCOPY Diagnosis:       Change in bowel habits      Constipation, unspecified constipation type      (polyps and incomplete colonoscopy)    Surgeons: Lalita Cervantes MD Anesthesiologist: Bert Mora MD    Anesthesia Type: MAC ASA Status: 3            Anesthesia Type: MAC    Vitals Value Taken Time   /44 02/13/25 1357   Temp pending 02/13/25 1357   Pulse 68 02/13/25 1357   Resp 16 02/13/25 1357   SpO2 100% room air 02/13/25 1357       [facility] AN Post Evaluation:   Patient Evaluated in PACU  Patient Participation: complete - patient participated  Level of Consciousness: awake and alert  Pain Score: 0  Pain Management: adequate  Airway Patency:  Dental exam unchanged from preop  Yes    Nausea/Vomiting: none  Cardiovascular Status: acceptable, blood pressure returned to baseline and hemodynamically stable  Respiratory Status: acceptable  Postoperative Hydration acceptable  Comments: Wide awake, talking a lot, very happy.  No pain, no nausea, no recall.  Voice and vision intact.      Bert Mora MD  2/13/2025 1:57 PM

## 2025-02-13 NOTE — H&P
History & Physical Examination    Patient Name: Page Pritchett  MRN: [id number]  [facility]: [id number]  YOB: 1955    Diagnosis: CRC screening     Prescriptions Prior to Admission[1]  Current Facility-Administered Medications   Medication Dose Route Frequency    lactated ringers infusion   Intravenous Continuous       Allergies: Allergies[2]    Past Medical History:    Angiomyolipoma of right kidney    Chronic low back pain    Diabetes (HCC)    Esophageal reflux    Gastritis    High blood pressure    High cholesterol    Osteoarthritis    Psoriasis    Stroke (HCC)    \"small stroke\" many years ago    Visual impairment    readers     Past Surgical History:   Procedure Laterality Date          Cholecystectomy      Egd  2020    Spine surgery procedure unlisted      Tonsillectomy  1992    Total knee replacement Right 2020    no associated bleeding complications     Family History   Problem Relation Age of Onset    Diabetes Mother         Diabetes Type 2    Other (Other) Mother     Prostate Cancer Father     Other (Other) Sister         Gallbladder disease    Hypertension Other         Family h/o    Other (Other) Other         Family h    Bleeding Disorders Neg     DVT/VTE Neg     Breast Cancer Neg     Ovarian Cancer Neg      Social History     Tobacco Use    Smoking status: Light Smoker     Current packs/day: 0.25     Average packs/day: 0.3 packs/day for 47.0 years (11.8 ttl pk-yrs)     Types: Cigarettes    Smokeless tobacco: Never   Substance Use Topics    Alcohol use: No     Alcohol/week: 0.0 standard drinks of alcohol         SYSTEM Check if Review is Normal Check if Physical Exam is Normal If not normal, please explain:   HEENT [X ] [ X]    NECK  [X ] [ X]    HEART [X ] [ X]    LUNGS [X ] [ X]    ABDOMEN [X ] [ X]    EXTREMITIES [X ] [ X]    OTHER        I have discussed the risks and benefits and alternatives of the procedure with the patient/family.  They understand and agree to proceed  with plan of care.   I have reviewed the History and Physical done within the last 30 days.  Any changes noted above.    Lalita Cervantes MD  [facility] - Gastroenterology  2025               [1]   Medications Prior to Admission   Medication Sig Dispense Refill Last Dose/Taking    JARDIANCE 25 MG Oral Tab    2025    gabapentin 100 MG Oral Cap Take 1 capsule (100 mg total) by mouth 3 (three) times daily. 270 capsule 0 Taking    aspirin (ASPIRIN 81) 81 MG Oral Chew Tab Chew 1 tablet (81 mg total) by mouth daily. 90 tablet 3 2025    alendronate 70 MG Oral Tab Take 1 tablet (70 mg total) by mouth every 7 days. 12 tablet 3 2025    metFORMIN  MG Oral Tablet 24 Hr Take 1 tablet (750 mg total) by mouth daily. 90 tablet 3 2025    cyclobenzaprine 5 MG Oral Tab Take 1 tablet (5 mg total) by mouth 3 (three) times daily as needed for Muscle spasms. 30 tablet 0 Taking As Needed    Losartan Potassium-HCTZ 100-12.5 MG Oral Tab Take 1 tablet by mouth daily. 90 tablet 3 2025    atorvastatin 10 MG Oral Tab Take 1 tablet (10 mg total) by mouth daily. 90 tablet 3 2025    ergocalciferol 1.25 MG (85878 UT) Oral Cap Take 1 capsule (50,000 Units total) by mouth once a week. 12 capsule 0 2025    polyethylene glycol, PEG 3350-KCl-NaBcb-NaCl-NaSulf, 236 g Oral Recon Soln Please follow the bowel prep instructions provided by [facility] 4000 mL 0     clotrimazole 1 % External Solution Apply 1 Application topically 2 (two) times daily. (Patient not taking: Reported on 2025) 60 mL 3     Continuous Glucose Sensor (FREESTYLE KANDI 14 DAY SENSOR) Does not apply Misc Use QID 2 each 11     [] ibuprofen 600 MG Oral Tab Take 1 tablet (600 mg total) by mouth every 6 (six) hours as needed for Pain. 40 tablet 0     ONETOUCH ULTRA In Vitro Strip daily.      [2]   Allergies  Allergen Reactions    Lisinopril SWELLING and TONGUE SWELLING    Codeine ANXIETY     Caused pimples     Hydrocodone-Acetaminophen ANXIETY

## 2025-02-13 NOTE — OPERATIVE REPORT
FLEXIBLE SIGMOIDOSCOPY  REPORT    Page Pritchett     1955 Age 69 year old   PCP TRE WATTS MD Endoscopist Lalita Cervantes MD     Date of procedure: 25    Procedure: Flexible sigmoidoscopy with cold biopsy, incomplete colonoscopy     Pre-operative diagnosis: CRC screening     Post-operative diagnosis: incomplete colonoscopy due to restricted mobility at the rectosigmoid junction     Medications: MAC    Withdrawal time: N/A    Procedure:  Informed consent was obtained from the patient after the risks of the procedure were discussed, including but not limited to bleeding, perforation, aspiration, infection, or possibility of a missed lesion. After discussions of the risks/benefits and alternatives to this procedure, as well as the planned sedation, the patient was placed in the left lateral decubitus position and begun on continuous blood pressure pulse oximetry and EKG monitoring and this was maintained throughout the procedure. Once an adequate level of sedation was obtained a digital rectal exam was completed. Then the lubricated tip of the [id number] diagnostic video colonoscope was inserted and advanced using the CO2 insufflation technique. An adult colonoscope, then a pediatric colonoscope, then an adult endoscope could not traverse past the rectosigmoid junction at 25 cm from the anal verge due to restricted mobility of the colon. Withdrawal was begun with thorough washing and careful examination of the mucosal walls and folds. Photodocumentation was obtained. The bowel prep was good. Views of the visualized colon were good with washing. I then carefully withdrew the instrument from the patient who tolerated the procedure well.     Complications: none.    Findings:   1. An adult colonoscope, then a pediatric colonoscope, then an adult endoscope could not traverse past the rectosigmoid junction at 25 cm from the anal verge due to restricted mobility of the colon. The colonoscopy was  incomplete.     2. 2 polyps noted as follows:      A. There were two sessile rectal polyps measuring 2-3 mm that were removed by cold forceps biopsies and retrieved.    2. Diverticulosis: none noted.    3. A retroflexed view of the rectum revealed small internal hemorrhoids.    4. The visualized rectal and sigmoid colon mucosa  showed a normal vascular pattern, without evidence of angioectasias or inflammation.     5. DEE: normal rectal tone, no masses palpated.     Impression:   Incomplete colonoscopy due  to restricted mobility of the colon. An adult colonoscope, then a pediatric colonoscope, then an adult endoscope could not traverse past the rectosigmoid junction at 25 cm from the anal verge due to restricted mobility of the colon.   2 small rectal polyps, resected and retrieved.  Small internal hemorrhoids.     Recommend:  Await pathology.   If the rectal polyps are hyperplastic, will plan for a CT colonography.  Follow up with MARIA M Alvarado in ~1 month.      >>>If tissue was obtained and you have not received your pathology results either by phone or letter within 2 weeks, please call our office at [phone].    Specimens: rectal polyps x 2     Blood loss: <1 ml

## 2025-02-13 NOTE — DISCHARGE INSTRUCTIONS
Home Care Instructions for Colonoscopy with Sedation    Diet:  - Resume your regular diet as tolerated unless otherwise instructed.  - Start with light meals to minimize bloating.  - Do not drink alcohol today.    Medication:  - If you have questions about resuming your normal medications, please contact your Primary Care Physician.    Activities:  - Take it easy today. Do not return to work today.  - Do not drive today.  - Do not operate any machinery today (including kitchen equipment).    Colonoscopy:  - You may notice some rectal \"spotting\" (a little blood on the toilet tissue) for a day or two after the exam. This is normal.  - If you experience any rectal bleeding (not spotting), persistent tenderness or sharp severe abdominal pains, oral temperature over 100 degrees Fahrenheit, light-headedness or dizziness, or any other problems, contact your doctor.    **If unable to reach your doctor, please go to the [facility] Emergency Room**    - Your referring physician will receive a full report of your examination.  - If you do not hear from your doctor's office within two weeks of your biopsy, please call them for your results.    You may be able to see your laboratory results in Zoom[employer] between 4 and 7 business days.  In some cases, your physician may not have viewed the results before they are released to Zoom[employer].  If you have questions regarding your results contact the physician who ordered the test/exam by phone or via Zoom[employer] by choosing \"Ask a Medical Question.\"

## 2025-02-13 NOTE — ANESTHESIA PREPROCEDURE EVALUATION
Anesthesia PreOp Note    HPI:     Page Pritchett is a 69 year old female who presents for preoperative consultation requested by: Lalita Cervantes MD    Date of Surgery: 2/13/2025    Procedure(s):  COLONOSCOPY  Indication: Change in bowel habits / Constipation, unspecified constipation type    Relevant Problems   No relevant active problems       NPO:                         History Review:  Patient Active Problem List    Diagnosis Date Noted    Memory problem 11/24/2023    Lacunar stroke (Former[facility]) 10/07/2023    Severe headache 10/07/2023    Subconjunctival hemorrhage of left eye 10/06/2023    PVD (peripheral vascular disease) with claudication 02/25/2023    Smokers' cough (Formerly [facility]) 02/25/2023    Angiomyolipoma of right kidney 09/16/2022    Morbid (severe) obesity due to excess calories (Formerly [facility]) 06/20/2022    Dysuria 06/20/2022    Sore throat 06/20/2022    Derangement of right shoulder joint 03/23/2022    S/P cervical spinal fusion 09/20/2021    Hand weakness 09/20/2021    Biomechanical lesion 09/20/2021    Myalgia 09/20/2021    Spondylolisthesis of lumbar region 09/08/2021    NSAID induced gastritis 07/28/2021    Cervical vertebral fusion 07/28/2021    Class 2 severe obesity due to excess calories with serious comorbidity and body mass index (BMI) of 39.0 to 39.9 in adult (Formerly [facility]) 07/28/2021    Lumbar foraminal stenosis 07/28/2021    Strain of gluteus medius 07/28/2021    Lumbar disc herniation with radiculopathy 07/28/2021    Bulge of lumbar disc without myelopathy 07/28/2021    DDD (degenerative disc disease), lumbosacral 07/28/2021    Facet syndrome, lumbar 07/28/2021    Lumbar trigger point syndrome 07/28/2021    Cervical spinal stenosis 02/09/2021    Essential hypertension 02/09/2021    Hyperlipidemia 02/09/2021    Cervical spondylosis with radiculopathy 02/09/2021    Migraine with aura and without status migrainosus, not intractable 10/05/2020    Primary osteoarthritis of right knee 10/05/2020    Type 2 diabetes mellitus  without complication, without long-term current use of insulin (HCC) 2019    Bilateral hand numbness 2017    Dizziness 2017    Cerebrovascular accident (CVA) due to thrombosis of right middle cerebral artery (HCC)        Past Medical History:    Angiomyolipoma of right kidney    Chronic low back pain    Diabetes (HCC)    Esophageal reflux    Gastritis    High blood pressure    High cholesterol    Osteoarthritis    Psoriasis    Stroke (HCC)    \"small stroke\" many years ago    Visual impairment    readers       Past Surgical History:   Procedure Laterality Date          Cholecystectomy      Egd  2020    Spine surgery procedure unlisted      Tonsillectomy  1992    Total knee replacement Right 2020    no associated bleeding complications       Prescriptions Prior to Admission[1]  Current Medications and Prescriptions Ordered in Epic[2]    Allergies[3]    Family History   Problem Relation Age of Onset    Diabetes Mother         Diabetes Type 2    Other (Other) Mother     Prostate Cancer Father     Other (Other) Sister         Gallbladder disease    Hypertension Other         Family h/o    Other (Other) Other         Family h    Bleeding Disorders Neg     DVT/VTE Neg     Breast Cancer Neg     Ovarian Cancer Neg      Social History     Socioeconomic History    Marital status:    Tobacco Use    Smoking status: Light Smoker     Current packs/day: 0.25     Average packs/day: 0.3 packs/day for 47.0 years (11.8 ttl pk-yrs)     Types: Cigarettes    Smokeless tobacco: Never   Vaping Use    Vaping status: Never Used   Substance and Sexual Activity    Alcohol use: No     Alcohol/week: 0.0 standard drinks of alcohol    Drug use: Never   Other Topics Concern    Caffeine Concern Yes     Comment: Soda daily    Exercise No       Available pre-op labs reviewed.  Lab Results   Component Value Date    WBC 6.9 2024    RBC 4.34 2024    HGB 13.1 2024    HCT 39.5 2024    MCV  91.0 11/21/2024    MCH 30.2 11/21/2024    MCHC 33.2 11/21/2024    RDW 12.7 11/21/2024    .0 11/21/2024     Lab Results   Component Value Date     11/21/2024    K 3.8 11/21/2024     11/21/2024    CO2 28.0 11/21/2024    BUN 19 11/21/2024    CREATSERUM 0.74 11/21/2024     (H) 11/21/2024    CA 10.2 11/21/2024          Vital Signs:  Body mass index is 30.55 kg/m².   height is 1.626 m (5' 4\") and weight is 80.7 kg (178 lb).   Vitals:    02/06/25 1159   Weight: 80.7 kg (178 lb)   Height: 1.626 m (5' 4\")        Anesthesia Evaluation     Patient summary reviewed and Nursing notes reviewed    No history of anesthetic complications   Airway   Mallampati: III  TM distance: <3 FB  Neck ROM: full  Dental      Pulmonary - normal exam    breath sounds clear to auscultation  Cardiovascular   Exercise tolerance: poor  (+) hypertension well controlled    Rhythm: regular  Rate: normal    Neuro/Psych    (+)  neuromuscular disease, CVA,        GI/Hepatic/Renal    (+) GERD well controlled    Endo/Other    (+) diabetes mellitus type 2 well controlled  Abdominal      Other findings: Multiple cracked teeth            Anesthesia Plan:   ASA:  3  Plan:   MAC  Plan Comments: Previous 2 anesthetic records reviewed.  MCA thrombosis, lacunar stroke, no residuals.  BS = 106  Informed Consent Plan and Risks Discussed With:  Patient and child/children      I have informed Page Pritchett and/or legal guardian or family member of the nature of the anesthetic plan, benefits, risks including possible dental damage if relevant, major complications, and any alternative forms of anesthetic management.   All of the patient's questions were answered to the best of my ability. The patient desires the anesthetic management as planned.  Bert Mora MD  2/13/2025 11:32 AM  Present on Admission:  **None**           [1]   Medications Prior to Admission   Medication Sig Dispense Refill Last Dose/Taking    JARDIANCE 25 MG Oral Tab     Taking    gabapentin 100 MG Oral Cap Take 1 capsule (100 mg total) by mouth 3 (three) times daily. 270 capsule 0 Taking    aspirin (ASPIRIN 81) 81 MG Oral Chew Tab Chew 1 tablet (81 mg total) by mouth daily. 90 tablet 3 Taking    alendronate 70 MG Oral Tab Take 1 tablet (70 mg total) by mouth every 7 days. 12 tablet 3 Taking    metFORMIN  MG Oral Tablet 24 Hr Take 1 tablet (750 mg total) by mouth daily. 90 tablet 3 Taking    cyclobenzaprine 5 MG Oral Tab Take 1 tablet (5 mg total) by mouth 3 (three) times daily as needed for Muscle spasms. 30 tablet 0 Taking As Needed    Losartan Potassium-HCTZ 100-12.5 MG Oral Tab Take 1 tablet by mouth daily. 90 tablet 3 Taking    atorvastatin 10 MG Oral Tab Take 1 tablet (10 mg total) by mouth daily. 90 tablet 3 Taking    ergocalciferol 1.25 MG (37000 UT) Oral Cap Take 1 capsule (50,000 Units total) by mouth once a week. 12 capsule 0     polyethylene glycol, PEG 3350-KCl-NaBcb-NaCl-NaSulf, 236 g Oral Recon Soln Please follow the bowel prep instructions provided by [facility] 4000 mL 0     clotrimazole 1 % External Solution Apply 1 Application topically 2 (two) times daily. (Patient not taking: Reported on 2025) 60 mL 3     Continuous Glucose Sensor (FREESTYLE KANDI 14 DAY SENSOR) Does not apply Misc Use QID 2 each 11     [] ibuprofen 600 MG Oral Tab Take 1 tablet (600 mg total) by mouth every 6 (six) hours as needed for Pain. 40 tablet 0     ONETOUCH ULTRA In Vitro Strip daily.      [2]   No current Epic-ordered facility-administered medications on file.     No current [facility]-ordered outpatient medications on file.   [3]   Allergies  Allergen Reactions    Lisinopril SWELLING and TONGUE SWELLING    Codeine ANXIETY     Caused pimples    Hydrocodone-Acetaminophen ANXIETY

## 2025-04-04 NOTE — TELEPHONE ENCOUNTER
Pharmacy requesting refill     atorvastatin 10 MG Oral Tab Take 1 tablet (10 mg total) by mouth daily. 90 tablet 3

## 2025-04-08 RX ORDER — ATORVASTATIN CALCIUM 10 MG/1
10 TABLET, FILM COATED ORAL EVERY 24 HOURS
Qty: 90 TABLET | Refills: 3 | Status: SHIPPED | OUTPATIENT
Start: 2025-04-08

## 2025-04-08 NOTE — TELEPHONE ENCOUNTER
Refill Per Protocol     Requested Prescriptions   Pending Prescriptions Disp Refills    atorvastatin 10 MG Oral Tab 90 tablet 3     Sig: Take 1 tablet (10 mg total) by mouth daily.       Cholesterol Medication Protocol Passed - 4/8/2025 12:24 PM        Passed - ALT < 80     Lab Results   Component Value Date    ALT 14 11/21/2024             Passed - ALT resulted within past year        Passed - Lipid panel within past 12 months     Lab Results   Component Value Date    CHOLEST 160 11/21/2024    TRIG 138 11/21/2024    HDL 53 11/21/2024    LDL 83 11/21/2024    VLDL 22 11/21/2024    NONHDLC 107 11/21/2024             Passed - In person appointment or virtual visit in the past 12 mos or appointment in next 3 mos     Recent Outpatient Visits              [facility], [facility]    [facility] Visit    2 months ago Type 2 diabetes mellitus with diabetic polyneuropathy, without long-term current use of insulin ([facility]    Office Visit    4 months ago Medicare annual wellness visit, initial    [facility], Keegan Butler MD    Office Visit    5 months ago Lumbar disc herniation with radiculopathy    [facility]    Office Visit    8 months ago Slow transit constipation    [facility]    [facility] Visit          Future Appointments         Provider Department Appt Notes    In 1 month [facility]                    Passed - Medication is active on med list

## 2025-04-17 RX ORDER — GABAPENTIN 100 MG/1
100 CAPSULE ORAL 3 TIMES DAILY
Qty: 270 CAPSULE | Refills: 0 | Status: SHIPPED | OUTPATIENT
Start: 2025-04-17

## 2025-05-22 ENCOUNTER — OFFICE VISIT (OUTPATIENT)
Dept: PHYSICAL MEDICINE AND REHAB | Facility: CLINIC | Age: 70
End: 2025-05-22
Payer: MEDICARE

## 2025-05-22 VITALS — WEIGHT: 178 LBS | BODY MASS INDEX: 30.39 KG/M2 | OXYGEN SATURATION: 97 % | HEART RATE: 69 BPM | HEIGHT: 64 IN

## 2025-05-22 DIAGNOSIS — M47.816 FACET SYNDROME, LUMBAR: Primary | ICD-10-CM

## 2025-05-22 DIAGNOSIS — M48.061 LUMBAR FORAMINAL STENOSIS: ICD-10-CM

## 2025-05-22 DIAGNOSIS — M54.16 LUMBAR RADICULOPATHY: ICD-10-CM

## 2025-05-22 DIAGNOSIS — M54.59 MECHANICAL LOW BACK PAIN: ICD-10-CM

## 2025-05-22 DIAGNOSIS — M51.369 BULGE OF LUMBAR DISC WITHOUT MYELOPATHY: ICD-10-CM

## 2025-05-22 DIAGNOSIS — M51.372 DEGENERATION OF INTERVERTEBRAL DISC OF LUMBOSACRAL REGION WITH DISCOGENIC BACK PAIN AND LOWER EXTREMITY PAIN: ICD-10-CM

## 2025-05-22 DIAGNOSIS — M47.816 LUMBAR SPONDYLOSIS: ICD-10-CM

## 2025-05-22 PROCEDURE — 3008F BODY MASS INDEX DOCD: CPT | Performed by: PHYSICAL MEDICINE & REHABILITATION

## 2025-05-22 PROCEDURE — 99214 OFFICE O/P EST MOD 30 MIN: CPT | Performed by: PHYSICAL MEDICINE & REHABILITATION

## 2025-05-22 PROCEDURE — 1159F MED LIST DOCD IN RCRD: CPT | Performed by: PHYSICAL MEDICINE & REHABILITATION

## 2025-05-22 PROCEDURE — 1160F RVW MEDS BY RX/DR IN RCRD: CPT | Performed by: PHYSICAL MEDICINE & REHABILITATION

## 2025-05-22 PROCEDURE — 1125F AMNT PAIN NOTED PAIN PRSNT: CPT | Performed by: PHYSICAL MEDICINE & REHABILITATION

## 2025-05-22 NOTE — PATIENT INSTRUCTIONS
1) Start gabapentin 100 mg three times per day. If limited improvement, then would increase to 200 mg three times per day.  2) Please call and schedule your MRI of the Lumbar spine at [phone].  Once you have your MRI scheduled, then call my office again to schedule a follow-up visit soon after your MRI so we may review the images together.  3) Continue home exercise program   4) Tylenol 500-1000 mg every 6-8 hours as needed for pain.  No more than 3000 mg daily.  5) I am not recommending the use NSAIDS as there is a history of NSAID induced gastritis .

## 2025-05-22 NOTE — PROGRESS NOTES
[facility]  Progress Note    CHIEF COMPLAINT:    Chief Complaint   Patient presents with    Follow - Up     LOV 04/29/2024. Pt here for f/u presents with BL thoracic back pain. Reports sharp pain. Pain is 6/10. Admits N/T in legs down to feet. Admits weakness. Taking tylenol and alendronate. Denies PT. XR C/L spine 4/29/24.       History of Present Illness  Page Pritchett is a 69 year old female who presents with chronic back pain and leg symptoms.    She has been experiencing significant back pain for over two months, which worsens when lying down and upon standing. The pain is rated as a 6 out of 10 and is accompanied by difficulty straightening up after getting up from a seated position, taking a few minutes before she can walk normally.    The pain radiates down both legs and is associated with numbness and tingling in all her toes. She experiences cramps in both feet and toes. She has been taking Tylenol for pain relief, but it has not been effective. She avoids Advil due to stomach discomfort. She has a history of using gabapentin, which was prescribed by a podiatrist, but she has not been taking it due to concerns about its effects on her liver.    She performs daily therapy exercises at home, which she learned during previous physical therapy sessions for her back. She has a history of knee surgery, which limits her ability to bend her knee significantly.    No recent accidents or injuries. Pain worsens with certain movements and improves with exercise and therapy.   BILATERAL low back pain with radiation to the bilateral lateral hip and buttocks over the last couple of months without an inciting event. Her pain is worse with sitting to standing and transitional movement. Her pain improves after a couple of minutes of walking. Her pain is rated a 6/10. She has been taking Tylenol with relief. She has radiating symptom down both legs tot he toes with cramping  and tingling in her toes. She last had Xr of the lumbar spine in 2024.     PAST MEDICAL HISTORY:  Past Medical History[1]    SURGICAL HISTORY:  Past Surgical History[2]    SOCIAL HISTORY:   Social History     Occupational History    Not on file   Tobacco Use    Smoking status: Light Smoker     Current packs/day: 0.25     Average packs/day: 0.3 packs/day for 47.0 years (11.8 ttl pk-yrs)     Types: Cigarettes    Smokeless tobacco: Never   Vaping Use    Vaping status: Never Used   Substance and Sexual Activity    Alcohol use: No     Alcohol/week: 0.0 standard drinks of alcohol    Drug use: Never    Sexual activity: Not on file       FAMILY HISTORY:   Family History[3]    CURRENT MEDICATIONS:   Current Medications[4]    ALLERGIES:   Allergies[5]    REVIEW OF SYSTEMS:   Review of Systems   Constitutional: Negative.    HENT: Negative.    Eyes: Negative.    Respiratory: Negative.    Cardiovascular: Negative.    Gastrointestinal: Negative.    Genitourinary: Negative.    Musculoskeletal: As per HPI  Skin: Negative.    Neurological: As per HPI  Endo/Heme/Allergies: Negative.    Psychiatric/Behavioral: Negative.      All other systems reviewed and are negative. Pertinent positives and negatives noted in the HPI.    PHYSICAL EXAM:   Pulse 69   Ht 64\"   Wt 178 lb (80.7 kg)   SpO2 97%   BMI 30.55 kg/m²     Body mass index is 30.55 kg/m².      General: No immediate distress  Head: Normocephalic/ Atraumatic  Eyes: Extra-occular movements intact.   Ears: No auricular hematoma or deformities  Mouth: No lesions or ulcerations  Heart: peripheral pulses intact. Normal capillary refill.   Lungs: Non-labored respirations  Abdomen: No abdominal guarding  Extremities: No lower extremity edema bilaterally   Skin: No lesions noted.   Cognition: alert & oriented x 3, attentive, able to follow 2 step commands, comprehension intact, spontaneous speech intact  Motor:    Musculoskeletal:    LUMBAR SPINE:  Inspection: no erythema, swelling, or  obvious deformity.  Their iliac crest and shoulder heights are symmetrical.  Postural exam reveals no scoliosis or kyphosis.  Palpation: Tender to palpation midline lumbar spine as well as bilateral lower lumbar facets and paraspinals, bilateral SI joints, bilateral gluteal tendons  ROM: Full active range of motion of the lumbar spine with pain during extension  Motor: 5/5 in all myotomes of the BILATERAL lower extremities except 4 out of 5 during bilateral dorsi flexion, right great toe extension, and bilateral plantarflexion  Sensation: Intact to light touch in all dermatomes of the lower extremities   Reflexes: 1/4 at L4 and S1  Facet Loading: Positive bilaterally        Data  Admission on 02/13/2025, Discharged on 02/13/2025   Component Date Value Ref Range Status    POC Glucose  02/13/2025 106 (H)  70 - 99 mg/dL Final    Case Report 02/13/2025    Final                    Value:Surgical Pathology                                Case: [id number]                                  Authorizing Provider:  Lalita Cervantes MD          Collected:           02/13/2025 01:50 PM          Ordering Location:     [facility]   Received:            02/13/2025 02:21 PM          Pathologist:           Corey Guzman MD                                                         Specimen:    Rectum, polyp x2                                                                           Final Diagnosis: 02/13/2025    Final                    Value:  Rectum polyps (x2):  Sessile serrated adenoma.  Hyperplastic polyp.        Clinical Information 02/13/2025    Final                    Value:R19.4 Change In Bowel Habits.  K59.00 Constipation, Unspecified Constipation Type.         Gross Description 02/13/2025    Final                    Value:The specimen is submitted in formalin labeled “Pritchett, rectal polyp x2” and consists of three fragments of pink-tan soft tissue measuring in aggregate 0.4 x 0.3 x 0.1 cm. The specimen is  submitted entirely in cassette A1. (jq)    Corey Guzman M.D./[facility] [place]      Interpretation 02/13/2025 Benign    Final   ]      Radiology Imaging:  I reviewed with the patient her X-ray of the lumbar spine   PROCEDURE: XR LUMBAR SPINE AP LAT FLEX EXT EM (CPT=72120)     COMPARISON: CT ABDOMEN PELVIS IV CONTRAST NO ORAL (ER), 6/06/2021, 11:53 AM.  MRI SPINE LUMBAR (CPT=72148), 10/14/2021, 2:47 PM.  [facility], XR LUMBAR SPINE AP LAT FLEX EXT (CPT=72110), 7/28/2021, 2:57 PM.     INDICATIONS: Lower back pain ongoing for 1 week. No known precipitating antecedent injury.     TECHNIQUE: Lumbar spine radiographs, 4 views (AP, lateral, flexion, and extension views)       FINDINGS:  ALIGNMENT:   The anatomic lumbar lordosis is slightly accentuated with trace anterolisthesis of L4 on L5.  VERTEBRAL BODIES:   A total of 5 non-rib-bearing lumbar-type vertebral bodies are identified. A chronic compression fracture deformity of the T12 vertebral segment is visible. Multilevel anterior osteophyte formation is demonstrated. Posteriorly, there  is facet arthrosis and Baastrup's disease.  DISC SPACES: Intervertebral disc space narrowing is most conspicuous at L4-L5 and L5-S1.  SACROILIAC JOINTS: Mild sclerosis is demonstrated bilaterally.  FLEXION/EXTENSION: There is no evidence of provoked subluxation on dynamic views.  OTHER: A heavy stool burden is demonstrated.                  Impression   CONCLUSION:  1. Degenerative changes of the lumbar spine, most notably at L4-L5 and L5-S1.     2. No evidence of instability on dynamic views.     3. No radiographically visible acute osseous injury of the lumbar spine.           elm-remote.        Dictated by (CST): Ezio Mascorro MD on 4/29/2024 at 11:04 PM      Finalized by (CST): Ezio Mascorro MD on 4/29/2024 at 11:07 PM             ASSESSMENT AND PLAN:  The patient is a pleasant 69-year-old female presents with midline and bilateral low back pain and bilateral buttock pain  with radiation to the bilateral lateral hips and down the legs.  I believe her symptoms are due to lumbar spondylosis with facet arthropathy and lumbar radiculopathy.  I have reviewed her most recent x-ray of the lumbar spine.  I am recommending she increase her gabapentin to 200 mg 3 times per day.  I have also recommended she have an MRI of the lumbar spine.  She can use Tylenol for pain.  I am recommending she avoid NSAIDs as she has NSAID induced gastritis.  Depending on the findings from the MRI will help determine her next steps.  She should continue her home exercise program.     Assessment & Plan  Lumbar radiculopathy  Chronic lumbar radiculopathy with bilateral leg pain, numbness, and tingling. Previous MRI in 2021. Gabapentin not taken due to hepatic concerns. Current renal function supports gabapentin use. Advised against NSAIDs due to gastrointestinal risks.  - Order MRI of the lumbar spine.  - Initiate gabapentin three times daily.  - Continue acetaminophen 1000 mg every 6-8 hours as needed.  - Avoid NSAIDs.    Arthritis of the feet  Suspected arthritis contributing to foot pain. Benefits from home exercise and therapy.  - Continue home exercise and therapy regimen.      RTC in 6 weeks for MRI review  Discharge Instructions were provided as documented in AVS summary.  The patient was in agreement with the assessment and plan.  All questions were answered.  There were no barriers to learning.         1. Facet syndrome, lumbar    2. Mechanical low back pain    3. Lumbar spondylosis    4. Degeneration of intervertebral disc of lumbosacral region with discogenic back pain and lower extremity pain    5. Lumbar foraminal stenosis    6. Bulge of lumbar disc without myelopathy    7. Lumbar radiculopathy        Alex B. Behar MD  Physical Medicine and Rehabilitation/Sports Medicine  [facility]  [place] Cures Act Notice to Patient: Medical documents like this are made available to  patients in the interest of transparency. However, be advised this is a medical document and it is intended as [id number] communication between your medical providers. This medical document may contain abbreviations, assessments, medical data, and results or other terms that are unfamiliar. Medical documents are intended to carry relevant information, facts as evident, and the clinical opinion of the practitioner. As such, this medical document may be written in language that appears blunt or direct. You are encouraged to contact your medical provider and/or [facility] if you have any questions about this medical document.   Abridge tool was used for dictation purposes only and the patient was not recorded at any point during the visit.              [1]   Past Medical History:   Angiomyolipoma of right kidney    Chronic low back pain    Diabetes (HCC)    Esophageal reflux    Essential hypertension    Taking losartan    Gastritis    High blood pressure    High cholesterol    Hyperlipidemia    Taking atorvastatin    Osteoarthritis    Psoriasis    Stroke (HCC)    \"small stroke\" many years ago    Visual impairment    readers   [2]   Past Surgical History:  Procedure Laterality Date          Cholecystectomy      Egd  2020    Spine surgery procedure unlisted      Tonsillectomy      Total knee replacement Right 2020    no associated bleeding complications   [3]   Family History  Problem Relation Age of Onset    Diabetes Mother         Diabetes Type 2    Other (Other) Mother     Prostate Cancer Father     Cancer Father         Prostate    Other (Other) Sister         Gallbladder disease    Hypertension Other         Family h/o    Other (Other) Other         Family h    Bleeding Disorders Neg     DVT/VTE Neg     Breast Cancer Neg     Ovarian Cancer Neg    [4]   Current Outpatient Medications   Medication Sig Dispense Refill    GABAPENTIN 100 MG Oral Cap TAKE 1 CAPSULE(100 MG)  BY MOUTH THREE TIMES DAILY 270 capsule 0    atorvastatin 10 MG Oral Tab Take 1 tablet (10 mg total) by mouth daily. 90 tablet 3    ergocalciferol 1.25 MG (34783 UT) Oral Cap Take 1 capsule (50,000 Units total) by mouth once a week. 12 capsule 0    JARDIANCE 25 MG Oral Tab       clotrimazole 1 % External Solution Apply 1 Application topically 2 (two) times daily. (Patient not taking: Reported on 1/21/2025) 60 mL 3    aspirin (ASPIRIN 81) 81 MG Oral Chew Tab Chew 1 tablet (81 mg total) by mouth daily. 90 tablet 3    alendronate 70 MG Oral Tab Take 1 tablet (70 mg total) by mouth every 7 days. 12 tablet 3    metFORMIN  MG Oral Tablet 24 Hr Take 1 tablet (750 mg total) by mouth daily. 90 tablet 3    Continuous Glucose Sensor (FREESTYLE KANDI 14 DAY SENSOR) Does not apply Misc Use QID 2 each 11    cyclobenzaprine 5 MG Oral Tab Take 1 tablet (5 mg total) by mouth 3 (three) times daily as needed for Muscle spasms. 30 tablet 0    Losartan Potassium-HCTZ 100-12.5 MG Oral Tab Take 1 tablet by mouth daily. 90 tablet 3    ONETOUCH ULTRA In Vitro Strip daily.     [5]   Allergies  Allergen Reactions    Lisinopril SWELLING and TONGUE SWELLING    Codeine ANXIETY     Caused pimples    Hydrocodone-Acetaminophen ANXIETY

## 2025-05-22 NOTE — PROGRESS NOTES
The following individual(s) verbally consented to be recorded using ambient AI listening technology and understand that they can each withdraw their consent to this listening technology at any point by asking the clinician to turn off or pause the recording:    Patient name: Page Pritchett  Additional names:

## 2025-06-02 ENCOUNTER — HOSPITAL ENCOUNTER (OUTPATIENT)
Dept: MRI IMAGING | Facility: HOSPITAL | Age: 70
Discharge: HOME | End: 2025-06-02
Attending: PHYSICAL MEDICINE & REHABILITATION
Payer: MEDICARE

## 2025-06-02 DIAGNOSIS — M47.816 FACET SYNDROME, LUMBAR: ICD-10-CM

## 2025-06-02 DIAGNOSIS — M51.369 BULGE OF LUMBAR DISC WITHOUT MYELOPATHY: ICD-10-CM

## 2025-06-02 DIAGNOSIS — M51.372 DEGENERATION OF INTERVERTEBRAL DISC OF LUMBOSACRAL REGION WITH DISCOGENIC BACK PAIN AND LOWER EXTREMITY PAIN: ICD-10-CM

## 2025-06-02 DIAGNOSIS — M47.816 LUMBAR SPONDYLOSIS: ICD-10-CM

## 2025-06-02 DIAGNOSIS — M48.061 LUMBAR FORAMINAL STENOSIS: ICD-10-CM

## 2025-06-02 DIAGNOSIS — M54.59 MECHANICAL LOW BACK PAIN: ICD-10-CM

## 2025-06-02 DIAGNOSIS — M54.16 LUMBAR RADICULOPATHY: ICD-10-CM

## 2025-06-02 PROCEDURE — 72148 MRI LUMBAR SPINE W/O DYE: CPT | Performed by: PHYSICAL MEDICINE & REHABILITATION

## 2025-06-12 ENCOUNTER — TELEPHONE (OUTPATIENT)
Dept: PHYSICAL MEDICINE AND REHAB | Facility: CLINIC | Age: 70
End: 2025-06-12

## 2025-06-12 ENCOUNTER — TELEMEDICINE (OUTPATIENT)
Dept: PHYSICAL MEDICINE AND REHAB | Facility: CLINIC | Age: 70
End: 2025-06-12
Payer: MEDICARE

## 2025-06-12 DIAGNOSIS — M54.16 LUMBAR RADICULOPATHY: ICD-10-CM

## 2025-06-12 DIAGNOSIS — M54.59 MECHANICAL LOW BACK PAIN: ICD-10-CM

## 2025-06-12 DIAGNOSIS — M48.062 SPINAL STENOSIS OF LUMBAR REGION WITH NEUROGENIC CLAUDICATION: ICD-10-CM

## 2025-06-12 DIAGNOSIS — M51.369 BULGE OF LUMBAR DISC WITHOUT MYELOPATHY: ICD-10-CM

## 2025-06-12 DIAGNOSIS — M51.372 DEGENERATION OF INTERVERTEBRAL DISC OF LUMBOSACRAL REGION WITH DISCOGENIC BACK PAIN AND LOWER EXTREMITY PAIN: ICD-10-CM

## 2025-06-12 DIAGNOSIS — M47.816 FACET SYNDROME, LUMBAR: Primary | ICD-10-CM

## 2025-06-12 DIAGNOSIS — M48.061 LUMBAR FORAMINAL STENOSIS: ICD-10-CM

## 2025-06-12 DIAGNOSIS — M47.816 LUMBAR SPONDYLOSIS: Primary | ICD-10-CM

## 2025-06-12 DIAGNOSIS — M47.816 LUMBAR SPONDYLOSIS: ICD-10-CM

## 2025-06-12 PROCEDURE — 1159F MED LIST DOCD IN RCRD: CPT | Performed by: PHYSICAL MEDICINE & REHABILITATION

## 2025-06-12 PROCEDURE — 1160F RVW MEDS BY RX/DR IN RCRD: CPT | Performed by: PHYSICAL MEDICINE & REHABILITATION

## 2025-06-12 PROCEDURE — 99214 OFFICE O/P EST MOD 30 MIN: CPT | Performed by: PHYSICAL MEDICINE & REHABILITATION

## 2025-06-12 NOTE — TELEPHONE ENCOUNTER
Initiated authorization for Caudal ALEKSANDRA UNDER IVCS. CPT/HCPCS 25569 dx:M54.16 to be done at [facility] with [employer] portal.    Status: Approved  Reference/Authorization # [id number]  Valid: 6/12/25-8/11/25  Authorization is not a guarantee of payment and may be subject to review once claim is submitted.     PLEASE INFORM THE PATIENT TO CONTACT THE OFFICE IF THE INSURANCE CHANGES AS HE/SHE IS RESPONSIBLE TO UPDATE THE OFFICE IF INSURANCE CHANGES SO THAT PROCEDURE IS BILLED CORRECTLY.

## 2025-06-12 NOTE — PROGRESS NOTES
[facility]  Progress Note    CHIEF COMPLAINT:    Chief Complaint   Patient presents with    Follow - Up     LOV: 05/22/2025. Pt is here to review LUMBAR MRI completed on: 06/02/2025.        History of Present Illness  Page Pritchett is a 69 year old female with degenerative arthritis who presents with bilateral leg pain.    She has experienced worsening leg pain, now affecting both legs, whereas previously it was only on one side. The pain radiates down both legs.    In 2021, she received an injection that provided significant relief at the time. However, her current symptoms have changed, with pain now present on both sides and extending down the legs.    Her current medications include Tylenol and gabapentin, which she takes for nerve pain. Gabapentin causes constipation and stomach pain, and she has not been using any laxatives to manage these side effects.    She has a strong fear of pain and anxiety related to medical procedures, stating 'I have a phobia with pain' and 'I can't stand the pain'. She is concerned about experiencing pain during procedures and prefers to be sedated to manage her anxiety.       PAST MEDICAL HISTORY:  Past Medical History[1]    SURGICAL HISTORY:  Past Surgical History[2]    SOCIAL HISTORY:   Social History     Occupational History    Not on file   Tobacco Use    Smoking status: Light Smoker     Current packs/day: 0.25     Average packs/day: 0.3 packs/day for 47.0 years (11.8 ttl pk-yrs)     Types: Cigarettes    Smokeless tobacco: Never   Vaping Use    Vaping status: Never Used   Substance and Sexual Activity    Alcohol use: No     Alcohol/week: 0.0 standard drinks of alcohol    Drug use: Never    Sexual activity: Not on file       FAMILY HISTORY:   Family History[3]    CURRENT MEDICATIONS:   Current Medications[4]    ALLERGIES:   Allergies[5]    REVIEW OF SYSTEMS:   Review of Systems   Constitutional: Negative.    HENT: Negative.     Eyes: Negative.    Respiratory: Negative.    Cardiovascular: Negative.    Gastrointestinal: Negative.    Genitourinary: Negative.    Musculoskeletal: As per HPI  Skin: Negative.    Neurological: As per HPI  Endo/Heme/Allergies: Negative.    Psychiatric/Behavioral: Negative.      All other systems reviewed and are negative. Pertinent positives and negatives noted in the HPI.    PHYSICAL EXAM:   There were no vitals taken for this visit.    There is no height or weight on file to calculate BMI.      General: No immediate distress  Head: Normocephalic/ Atraumatic  Eyes: Extra-occular movements intact.   Ears: No auricular hematoma or deformities  Mouth: No lesions or ulcerations  Heart: peripheral pulses intact. Normal capillary refill.   Lungs: Non-labored respirations  Abdomen: No abdominal guarding  Extremities: No lower extremity edema bilaterally   Skin: No lesions noted.   Cognition: alert & oriented x 3, attentive, able to follow 2 step commands, comprehension intact, spontaneous speech intact  Motor:    Musculoskeletal:        Data  Admission on 02/13/2025, Discharged on 02/13/2025   Component Date Value Ref Range Status    POC Glucose  02/13/2025 106 (H)  70 - 99 mg/dL Final    Case Report 02/13/2025    Final                    Value:Surgical Pathology                                Case: [id number]                                  Authorizing Provider:  Lalita Cervantes MD          Collected:           02/13/2025 01:50 PM          Ordering Location:     [facility]   Received:            02/13/2025 02:21 PM          Pathologist:           Corey Guzman MD                                                         Specimen:    Rectum, polyp x2                                                                           Final Diagnosis: 02/13/2025    Final                    Value:  Rectum polyps (x2):  Sessile serrated adenoma.  Hyperplastic polyp.        Clinical Information 02/13/2025    Final                     Value:R19.4 Change In Bowel Habits.  K59.00 Constipation, Unspecified Constipation Type.         Gross Description 02/13/2025    Final                    Value:The specimen is submitted in formalin labeled “Pritchett, rectal polyp x2” and consists of three fragments of pink-tan soft tissue measuring in aggregate 0.4 x 0.3 x 0.1 cm. The specimen is submitted entirely in cassette A1. (jq)    Corey Guzman M.D./[facility]      Interpretation 02/13/2025 Benign    Final   ]      Radiology Imaging:  I reviewed with the patient her MRI of the lumbar spine   MRI SPINE LUMBAR (CPT=72148)  Narrative: PROCEDURE: MRI SPINE LUMBAR (CPT=72148)     COMPARISON: XR LUMBAR SPINE AP LAT FLEX EXT (CPT=72110), 4/29/2024, 3:15 PM.  XR LUMBAR SPINE AP LAT FLEX EXT (CPT=72110), 7/28/2021, 2:57 PM.  CTA ABD/PEL (CPT=74174), 11/03/2022, 10:03 AM.  CT UROGRAM(W+WO) W/3D SH(CPT=74178/08188), 8/05/2022, 10:53   AM.  [facility], MRI SPINE LUMBAR (CPT=72148), 10/14/2021, 2:47 PM.     INDICATIONS: Lumbar radiculopathy; degeneration of intervertebral disc bulge of lumbar spine without myelopathy; lumbar foraminal stenosis (M54.16, M51.369, M48.061, M51.372).     TECHNIQUE: A variety of imaging planes and parameters were utilized for visualization of suspected pathology.     FINDINGS:   NUMERATION: For the purposes of this examination, the lowest fully formed disc space is designated as L5-S1.  ALIGNMENT: There is interruption of the expected lumbar lordosis by trace retrolisthesis of L3 on L4 and slight anterolistheses of L4 on L5 and L5 on S1.  BONES: A chronic fracture involving the superior endplate of T12 is evident with approximately 50% loss of vertebral body height. There is minimal retropulsion. No suspicious osseous lesion is evident. Multilevel anterior osteophyte formation is noted.   Slight reciprocal endplate signal abnormalities are likely degenerative in etiology.  CORD/CAUDA EQUINA: The conus  medullaris terminates at the level of the thoracolumbar junction. The distal cord and nerve roots have normal caliber, contour, and signal intensity.  PARASPINAL AREA: No visible mass.  Mild superficial paraspinal subcutaneous edema is observed.  OTHER:   A previously seen fat-intensity lesion at the upper pole of the right kidney appears substantially decreased in conspicuity.     LUMBAR DISC LEVELS:  L1-L2: There is mild disc degeneration with left worse than right hypertrophic facet arthrosis and associated buckling of the ligamentum flavum. No significant spinal canal or foraminal impingement results.  L2-L3: A moderate, diffuse disc bulge is seen with superimposed broad-based bilateral foraminal protrusions. Hypertrophic facet arthrosis is seen with slight buckling of the ligamentum flavum. There is mild prominence of dorsal epidural fat. These   findings contribute to borderline spinal canal narrowing without significant foraminal impingement.  L3-L4: A moderate, diffuse disc bulge is seen with superimposed broad-based bilateral foraminal protrusions. There is hypertrophic facet arthrosis with concurrent buckling of the ligamentum flavum. Additional prominence of dorsal epidural fat is seen.   These findings cause moderate spinal canal stenosis with right greater than left subarticular zone encroachment of the descending L4 nerve roots and mild right foraminal stenosis of the exiting L3 nerve root.  L4-L5: A large, diffuse disc bulge is seen with superimposed broad-based bilateral foraminal protrusions. Hypertrophic facet arthrosis is manifested with associated buckling of the ligamentum flavum. These findings contribute to moderate spinal canal   stenosis with bilateral subarticular zone contact of the descending L5 nerve roots.  L5-S1: A moderate to large, diffuse disc bulge is seen superimposed broad-based left greater than right foraminal and far lateral disc protrusions. Hypertrophic facet arthrosis is  seen, worse on the left side than right. These findings contribute to   bilateral subarticular zone encroachment of the descending S1 nerve roots and asymmetric moderate foraminal narrowing of the exiting left L5 nerve root                 Impression: CONCLUSION:   1. Multilevel degenerative changes of the lumbar spine are apparent with moderate spinal canal narrowing at L3-L4 and L4-L5.     2. Additional subarticular foraminal compromise as above described.     3. Chronic compression fracture deformity T12 with slight retropulsion of fracture fragments.     4. No acute osseous injury of the lumbar spine is detected.     5. Decreased conspicuity of the previously seen right renal angiomyolipoma.     6. Lesser incidental findings as above.           elm-remote.        Dictated by (CST): Ezio Mascorro MD on 6/05/2025 at 5:15 AM       Finalized by (CST): Ezio Mascorro MD on 6/05/2025 at 5:27 AM              ASSESSMENT AND PLAN:  The patient is a pleasant 69-year-old female presents with bilateral low back pain with radiation down both legs which is due to lumbar stenosis with facet hypertrophy and a lumbar radiculopathy.  I am recommending a caudal epidural steroid injection under IV conscious sedation.  She will follow-up with me 2 to 4 weeks after the procedure.  She should continue gabapentin and use MiraLAX for constipation.     Assessment & Plan  Lumbar radiculopathy  Chronic lumbar radiculopathy with bilateral lower extremity pain due to disc bulges at L3-L4 causing nerve compression.  - Schedule caudal epidural steroid injection under IV conscious sedation.  - Continue gabapentin for nerve pain.  - Recommend Miralax for constipation associated with gabapentin use.    Facet syndrome, lumbar  Lumbar facet syndrome with enlarged facet joints causing nerve pressure and bilateral leg pain indicating nerve involvement.    Degeneration of intervertebral disc of lumbosacral region  Degenerative changes in the lumbar  spine with disc bulges contributing to nerve compression and radiculopathy.    Lumbar foraminal stenosis  Lumbar foraminal stenosis contributing to nerve compression and bilateral leg pain.    Bulge of lumbar disc without myelopathy  Bulging discs at L3-L4 without myelopathy, causing nerve compression and radiculopathy.      RTC in 3 to 4 weeks after procedure  Discharge Instructions were provided as documented in AVS summary.  The patient was in agreement with the assessment and plan.  All questions were answered.  There were no barriers to learning.         1. Facet syndrome, lumbar    2. Lumbar spondylosis    3. Degeneration of intervertebral disc of lumbosacral region with discogenic back pain and lower extremity pain    4. Lumbar foraminal stenosis    5. Mechanical low back pain    6. Bulge of lumbar disc without myelopathy    7. Lumbar radiculopathy    8. Spinal stenosis of lumbar region with neurogenic claudication        Alex B. Behar MD  Physical Medicine and Rehabilitation/Sports Medicine  [address] [employer] Cures Act Notice to Patient: Medical documents like this are made available to patients in the interest of transparency. However, be advised this is a medical document and it is intended as [id number] communication between your medical providers. This medical document may contain abbreviations, assessments, medical data, and results or other terms that are unfamiliar. Medical documents are intended to carry relevant information, facts as evident, and the clinical opinion of the practitioner. As such, this medical document may be written in language that appears blunt or direct. You are encouraged to contact your medical provider and/or [facility] if you have any questions about this medical document.   Abridge tool was used for dictation purposes only and the patient was not recorded at any point during the visit.              [1]   Past Medical  History:   Angiomyolipoma of right kidney    Chronic low back pain    Diabetes (HCC)    Esophageal reflux    Essential hypertension    Taking losartan    Gastritis    High blood pressure    High cholesterol    Hyperlipidemia    Taking atorvastatin    Osteoarthritis    Psoriasis    Stroke (HCC)    \"small stroke\" many years ago    Visual impairment    readers   [2]   Past Surgical History:  Procedure Laterality Date          Cholecystectomy      Egd  2020    Spine surgery procedure unlisted      Tonsillectomy  1992    Total knee replacement Right 2020    no associated bleeding complications   [3]   Family History  Problem Relation Age of Onset    Diabetes Mother         Diabetes Type 2    Other (Other) Mother     Prostate Cancer Father     Cancer Father         Prostate    Other (Other) Sister         Gallbladder disease    Hypertension Other         Family h/o    Other (Other) Other         Family h    Bleeding Disorders Neg     DVT/VTE Neg     Breast Cancer Neg     Ovarian Cancer Neg    [4]   Current Outpatient Medications   Medication Sig Dispense Refill    GABAPENTIN 100 MG Oral Cap TAKE 1 CAPSULE(100 MG) BY MOUTH THREE TIMES DAILY 270 capsule 0    atorvastatin 10 MG Oral Tab Take 1 tablet (10 mg total) by mouth daily. 90 tablet 3    ergocalciferol 1.25 MG (67697 UT) Oral Cap Take 1 capsule (50,000 Units total) by mouth once a week. 12 capsule 0    JARDIANCE 25 MG Oral Tab       clotrimazole 1 % External Solution Apply 1 Application topically 2 (two) times daily. (Patient not taking: Reported on 2025) 60 mL 3    aspirin (ASPIRIN 81) 81 MG Oral Chew Tab Chew 1 tablet (81 mg total) by mouth daily. 90 tablet 3    alendronate 70 MG Oral Tab Take 1 tablet (70 mg total) by mouth every 7 days. 12 tablet 3    metFORMIN  MG Oral Tablet 24 Hr Take 1 tablet (750 mg total) by mouth daily. 90 tablet 3    Continuous Glucose Sensor (FREESTYLE KANDI 14 DAY SENSOR) Does not apply Misc Use QID 2 each  11    cyclobenzaprine 5 MG Oral Tab Take 1 tablet (5 mg total) by mouth 3 (three) times daily as needed for Muscle spasms. 30 tablet 0    Losartan Potassium-HCTZ 100-12.5 MG Oral Tab Take 1 tablet by mouth daily. 90 tablet 3    ONETOUCH ULTRA In Vitro Strip daily.     [5]   Allergies  Allergen Reactions    Lisinopril SWELLING and TONGUE SWELLING    Codeine ANXIETY     Caused pimples    Hydrocodone-Acetaminophen ANXIETY

## 2025-06-12 NOTE — PATIENT INSTRUCTIONS
1)My office will call you to schedule the Caudal ALEKSANDRA under IVCS once the procedure is approved by your insurance carrier.    2) Tylenol 500-1000 mg every 6-8 hours as needed for pain.  No more than 3000 mg daily.  3) Start gabapentin 100 mg three times per day. If limited improvement, then would increase to 200 mg three times per day.  4) Start Miralax for constipation

## 2025-06-13 NOTE — TELEPHONE ENCOUNTER
The updated IV Conscious Sedation policy for surgical procedures at [facility]. As the patient does not carry a diagnosis of Parkinson’s disease or extreme tremors, the procedure does not meet the criteria to be deemed medically necessary for IVCS.  Anesthesia will therefore be adjusted to local with oral sedation (Valium) or could be rescheduled to another facility ([facility]/ENDO).      Please advise if MAC sedation at OR is ok to move forward with on 7/2/2025 or IVCS at ENDO on 7/18/2025.

## 2025-06-16 NOTE — TELEPHONE ENCOUNTER
Patient has been scheduled for Caudal Epidural Steroid Injection on 7/18/2025 at ENDO with Dr. Behar.   Anesthesia type:  IVCS - Per ENDO, add the requested IVCS medications and instructions(physician’s preferred prescription has been included in the case/surgical case).  Arrival Time: 945am & Procedure Time: 1045am  Patient was advised that if he/she does receive the covid vaccine it needs to be at least 2 weeks before or after the injection.  Medications and allergies reviewed.  Educated to hold NSAIDS (Aleve, Ibuprofen, Motrin, Advil) and anti-inflammatories (Meloxicam, Naproxen, Diclofenac, Celebrex) and for cervical injections must hold Multi-Vitamins, Vitamin E, Fish Oil/Omega-3.  If patient is receiving IVCS, weight loss oral/injectable medications will need to be held for 7 days prior, ARBs/ACE Inhibitors day of procedure, SGLT2 Inhibitors 4 days prior to procedure and for males: ED Rx for 3 days prior to procedure.  Patient informed to fast 8 hours prior to procedure and 10-12 hours prior to procedure with IVCS if patient is on a weight loss medication.   Patient informed of ENDO's  policy:  he/she will need a  to and from procedure.   Endoscopy is located in the [facility] [address], [facility], [address]. 2nd floor to check in.  P:  [phone]     may park in the blue/green lot.  Patient verbalized understanding and agrees with plan.  Scheduled in Epic: Yes  Scheduled in Surgical Case: Yes  Follow up appointment made: NOV: 7/18/2025 Behar, Alex, MD  Authorization date valid until 8/11/2025 at [facility].

## 2025-07-02 RX ORDER — ERGOCALCIFEROL 1.25 MG/1
50000 CAPSULE, LIQUID FILLED ORAL WEEKLY
Qty: 12 CAPSULE | Refills: 0 | Status: SHIPPED | OUTPATIENT
Start: 2025-07-02

## 2025-07-02 NOTE — TELEPHONE ENCOUNTER
Please Review. Protocol Failed; No Protocol     Requested Prescriptions   Pending Prescriptions Disp Refills    ERGOCALCIFEROL 1.25 MG (68674 UT) Oral Cap [Pharmacy Med Name: VITAMIN D2 50,000IU (ERGO) CAP RX] 12 capsule 0     Sig: TAKE 1 CAPSULE BY MOUTH 1 TIME A WEEK       There is no refill protocol information for this order

## 2025-07-11 RX ORDER — MIDAZOLAM HYDROCHLORIDE 1 MG/ML
1 INJECTION INTRAMUSCULAR; INTRAVENOUS EVERY 5 MIN PRN
Status: DISCONTINUED | OUTPATIENT
Start: 2025-07-11 | End: 2025-07-11

## 2025-07-11 RX ORDER — MIDAZOLAM HYDROCHLORIDE 1 MG/ML
1 INJECTION INTRAMUSCULAR; INTRAVENOUS EVERY 5 MIN PRN
Status: CANCELLED | OUTPATIENT
Start: 2025-07-11

## 2025-07-18 ENCOUNTER — APPOINTMENT (OUTPATIENT)
Dept: GENERAL RADIOLOGY | Facility: HOSPITAL | Age: 70
End: 2025-07-18
Attending: PHYSICAL MEDICINE & REHABILITATION
Payer: MEDICARE

## 2025-07-18 ENCOUNTER — HOSPITAL ENCOUNTER (OUTPATIENT)
Facility: HOSPITAL | Age: 70
Setting detail: HOSPITAL OUTPATIENT SURGERY
Discharge: HOME | End: 2025-07-18
Attending: PHYSICAL MEDICINE & REHABILITATION | Admitting: PHYSICAL MEDICINE & REHABILITATION
Payer: MEDICARE

## 2025-07-18 VITALS
WEIGHT: 178 LBS | HEIGHT: 64 IN | DIASTOLIC BLOOD PRESSURE: 69 MMHG | OXYGEN SATURATION: 97 % | SYSTOLIC BLOOD PRESSURE: 122 MMHG | BODY MASS INDEX: 30.39 KG/M2 | RESPIRATION RATE: 11 BRPM | HEART RATE: 58 BPM

## 2025-07-18 PROBLEM — M48.062 SPINAL STENOSIS OF LUMBAR REGION WITH NEUROGENIC CLAUDICATION: Status: ACTIVE | Noted: 2025-07-18

## 2025-07-18 LAB — GLUCOSE BLDC GLUCOMTR-MCNC: 123 MG/DL (ref 70–99)

## 2025-07-18 PROCEDURE — 62323 NJX INTERLAMINAR LMBR/SAC: CPT | Performed by: PHYSICAL MEDICINE & REHABILITATION

## 2025-07-18 PROCEDURE — 99152 MOD SED SAME PHYS/QHP 5/>YRS: CPT | Performed by: PHYSICAL MEDICINE & REHABILITATION

## 2025-07-18 RX ORDER — BETAMETHASONE SODIUM PHOSPHATE AND BETAMETHASONE ACETATE 3; 3 MG/ML; MG/ML
INJECTION, SUSPENSION INTRA-ARTICULAR; INTRALESIONAL; INTRAMUSCULAR; SOFT TISSUE
Status: DISCONTINUED | OUTPATIENT
Start: 2025-07-18 | End: 2025-07-18

## 2025-07-18 RX ORDER — SODIUM CHLORIDE 9 MG/ML
INJECTION, SOLUTION INTRAMUSCULAR; INTRAVENOUS; SUBCUTANEOUS
Status: DISCONTINUED | OUTPATIENT
Start: 2025-07-18 | End: 2025-07-18

## 2025-07-18 RX ORDER — IOPAMIDOL 408 MG/ML
INJECTION, SOLUTION INTRATHECAL
Status: DISCONTINUED | OUTPATIENT
Start: 2025-07-18 | End: 2025-07-18

## 2025-07-18 RX ORDER — SODIUM CHLORIDE, SODIUM LACTATE, POTASSIUM CHLORIDE, CALCIUM CHLORIDE 600; 310; 30; 20 MG/100ML; MG/100ML; MG/100ML; MG/100ML
100 INJECTION, SOLUTION INTRAVENOUS CONTINUOUS
Status: DISCONTINUED | OUTPATIENT
Start: 2025-07-18 | End: 2025-07-18

## 2025-07-18 RX ORDER — MIDAZOLAM HYDROCHLORIDE 1 MG/ML
1 INJECTION INTRAMUSCULAR; INTRAVENOUS EVERY 5 MIN PRN
Status: DISCONTINUED | OUTPATIENT
Start: 2025-07-18 | End: 2025-07-18

## 2025-07-18 NOTE — DISCHARGE SUMMARY
Outpatient Surgery Brief Discharge Summary         Patient ID:  Page Pritchett  [id number]  69 year old  9/5/1955    Discharge Diagnoses: Lumbar stenosis, lumbar foraminal stenosis, lumbar disc bulge    Procedures: Caudal ALEKSANDRA under IVCS    Discharged Condition: stable    Disposition: home    Patient Instructions: Follow-up with Alex Behar, MD in 1-2 weeks.    Diet: regular diet  Activity: as tolerated    Alex B. Behar MD, [facility]  Physical Medicine and Rehabilitation/Sports Medicine  [facility]

## 2025-07-18 NOTE — OPERATIVE REPORT
Caudal Epidural Steroid Injection  NAME:  Page Pritchett    MR #:    [id number] :  1955     PHYSICIAN:  Alex Behar, MD        Operative Report    DATE OF PROCEDURE: 2025   PREOPERATIVE DIAGNOSES: Lumbar spondylosis, lumbar foraminal stenosis, lumbar disc bulge   POSTOPERATIVE DIAGNOSES:   Same   PROCEDURES: Caudal epidural steroid injection done under fluoroscopic guidance with contrast enhancement.   SURGEON: Alex Behar, MD   ANESTHESIA: Conscious Sedation    Time of sedation: 8 Minutes with 1 mg of Versed and 100 mcg of fentanyl     OPERATIVE PROCEDURE:  The patient was consented.  She was brought into the operating room suite and placed on the fluoroscopy table in the prone position.  She was sterilely prepped and draped in routine fashion.  The sacral hiatus was identified.  The overlying skin was anesthetized.  A 22-gauge spinal needle was introduced into the epidural space.  Needle placement was verified using fluoroscopy and radiographic contrast showing an epidurogram.  There was no withdrawal of blood of CSF from the needle.  Radiographic interpretation was that the needle was in proper position for a caudal epidural steroid injection.  I placed a mixture of 3mL of 6mg/mL Celestone, 3 mL of normal saline and 9 mL of 1% preservative-free lidocaine into the epidural space.  The patient tolerated the procedure well without any immediate complications.    Alex B. Behar MD, [facility]  Physical Medicine and Rehabilitation/Sports Medicine  [facility]

## 2025-07-18 NOTE — DISCHARGE INSTRUCTIONS
[facility] ENDOSCOPY LAB SUITES  [address]  [facility] [address]  Dept: [phone]  Loc: [phone]    No name on file       Post Injection/Procedure Instructions    PAIN:  Your usual pain should gradually decrease in 2-3 days, but relief may sometimes take up to two weeks after your procedure.  A temporary flare-up of pain for 1-2 days after a procedure is also normal.  Please take your usual pain medicines if this happens.      ACTIVITY LEVELS:  Day of Procedure:  Take it easy.  We recommend no new activities or heavy work.  Avoid sitting or standing for long periods of time and change your position as needed.  Day 2:  You may return to normal activities within reason.  If your physician gives you specific instructions, please follow these.  You may return to physical therapy.      DIET:  Return to your normal diet as tolerated.    MEDICATIONS:  Aspirin and Blood-thinners:  Follow specific instructions your doctor gave you.  Otherwise, refrain from taking aspirin and other blood-thinning medications for 6 hours after your injection.  Then resume your next scheduled dose.    Resume your other medications the day of the injection.    BANDAGE:  Remove after 24 hours.    SHOWERING:  You may shower the following day.  No bathing, swimming, or hot tub for 2 days after the procedure to reduce the risk of infection.    COLD PACKS:  You may use ice compresses over the injection site - 20 minutes on, then 40 minutes off as needed.  To avoid skin injury, place a thin cloth between cold pack and the skin.  Do not apply cold packs to numb areas following injection.    Contact [facility] immediately if you experience any of the following:  Fever (over 100 degrees F), chills, drainage, excessive swelling or redness from the injection site, problems with bowel or bladder control, or new weakness or new severe pain.  If you cannot reach your physician, please go to the nearest  emergency room.      COMMON SIDE EFFECTS:  Numbness for 4 to 8 hours due to the local anesthetic.  Minor pain at the injection site.  A small amount of bleeding at the injection site.  If a steroid medication was used during the procedure, possible side effects include redness of the face, headache, trouble sleeping, indigestion, increased appetite and/or a low grade fever (less that 100 degrees F).  All side effects should disappear within 1 to 3 days after the procedure.    POST-PROCEDURAL HEADACHE:  Mild headaches may be a normal reaction after a steroid injection.  Lie down as much as possible for the first 24 hours.  You can take Tylenol up to 3 grams per day in doses of 1 gram every 8 hours.  Drink plenty of caffeinated beverages.  If you continue to have headaches after 24 hours following the procedure, or experience severe headaches, please contact our office.

## 2025-07-18 NOTE — H&P
[facility]  part of [facility]    History & Physical    Page Pritchett Patient Status:  Hospital Outpatient Surgery    1955 MRN [id number]   Location [facility] ENDOSCOPY LAB SUITES Attending Behar, Alex, MD   Hosp Day # 0 PCP TRE WATTS MD     Date of Admission:  2025    History of Present Illness:  Page Pritchett is a(n) 69 year old female presents with bilateral low back pain with radiation down both legs which is due to lumbar stenosis with facet hypertrophy and a lumbar radiculopathy. I am recommending a caudal epidural steroid injection under IV conscious sedation. She will follow-up with me 2 to 4 weeks after the procedure. She should continue gabapentin and use MiraLAX for constipation.     History:  Past Medical History[1]  Past Surgical History[2]  Family History[3]   reports that she has been smoking cigarettes. She has a 11.8 pack-year smoking history. She has never used smokeless tobacco. She reports that she does not drink alcohol and does not use drugs.    Allergies:  Allergies[4]    Home Medications:  Prescriptions Prior to Admission[5]    Physical Exam:   General: Alert, orientated x3.  Cooperative.  No apparent distress.  Vital Signs:  Height 64\", weight 178 lb (80.7 kg), not currently breastfeeding.  HEENT: Exam is unremarkable.  Pupils are equal and round.    Lungs: no labored breathing.  Cardiac: Regular rate and rhythm.  Abdomen:  Soft, non-distended  Extremities:  No lower extremity edema noted.    Skin: Normal texture and turgor.  Neurologic:     Impression and Plan:  Problem List[6]    Ok to proceed with caudal epidural steroid injection under local anesthesia    Alex Behar, MD  2025  9:34 AM       [1]   Past Medical History:   Angiomyolipoma of right kidney    Chronic low back pain    Diabetes (HCC)    Esophageal reflux    Essential hypertension    Taking losartan    Gastritis    High blood pressure    High cholesterol    Hx of motion  sickness    Hyperlipidemia    Taking atorvastatin    Osteoarthritis    Psoriasis    Stroke (HCC)    \"small stroke\" many years ago    Visual impairment    readers   [2]   Past Surgical History:  Procedure Laterality Date          Cholecystectomy      Egd  2020    Spine surgery procedure unlisted      Tonsillectomy  1992    Total knee replacement Right 2020    no associated bleeding complications   [3]   Family History  Problem Relation Age of Onset    Diabetes Mother         Diabetes Type 2    Other (Other) Mother     Prostate Cancer Father     Cancer Father         Prostate    Other (Other) Sister         Gallbladder disease    Hypertension Other         Family h/o    Other (Other) Other         Family h    Bleeding Disorders Neg     DVT/VTE Neg     Breast Cancer Neg     Ovarian Cancer Neg    [4]   Allergies  Allergen Reactions    Lisinopril SWELLING and TONGUE SWELLING    Codeine ANXIETY     Caused pimples    Hydrocodone-Acetaminophen ANXIETY   [5]   Medications Prior to Admission   Medication Sig Dispense Refill Last Dose/Taking    ergocalciferol 1.25 MG (40457 UT) Oral Cap Take 1 capsule (50,000 Units total) by mouth once a week. 12 capsule 0 Taking    GABAPENTIN 100 MG Oral Cap TAKE 1 CAPSULE(100 MG) BY MOUTH THREE TIMES DAILY 270 capsule 0 Taking    atorvastatin 10 MG Oral Tab Take 1 tablet (10 mg total) by mouth daily. 90 tablet 3 Taking    JARDIANCE 25 MG Oral Tab Take 1 tablet (25 mg total) by mouth daily.   Taking    aspirin (ASPIRIN 81) 81 MG Oral Chew Tab Chew 1 tablet (81 mg total) by mouth daily. 90 tablet 3 Taking    alendronate 70 MG Oral Tab Take 1 tablet (70 mg total) by mouth every 7 days. 12 tablet 3 Taking    metFORMIN  MG Oral Tablet 24 Hr Take 1 tablet (750 mg total) by mouth daily. 90 tablet 3 Taking    Losartan Potassium-HCTZ 100-12.5 MG Oral Tab Take 1 tablet by mouth daily. 90 tablet 3 Taking    Continuous Glucose Sensor (FREESTYLE KANDI 14 DAY SENSOR) Does not  apply Misc Use QID 2 each 11     cyclobenzaprine 5 MG Oral Tab Take 1 tablet (5 mg total) by mouth 3 (three) times daily as needed for Muscle spasms. 30 tablet 0     ONETOUCH ULTRA In Vitro Strip daily.      [6]   Patient Active Problem List  Diagnosis    Bilateral hand numbness    Cerebrovascular accident (CVA) due to thrombosis of right middle cerebral artery (HCC)    Dizziness    Type 2 diabetes mellitus without complication, without long-term current use of insulin ([facility])    Migraine with aura and without status migrainosus, not intractable    Primary osteoarthritis of right knee    Cervical spinal stenosis    Essential hypertension    Hyperlipidemia    Cervical spondylosis with radiculopathy    NSAID induced gastritis    Cervical vertebral fusion    Class 2 severe obesity due to excess calories with serious comorbidity and body mass index (BMI) of 39.0 to 39.9 in adult ([facility])    Lumbar foraminal stenosis    Strain of gluteus medius    Lumbar disc herniation with radiculopathy    Bulge of lumbar disc without myelopathy    DDD (degenerative disc disease), lumbosacral    Facet syndrome, lumbar    Lumbar trigger point syndrome    Spondylolisthesis of lumbar region    S/P cervical spinal fusion    Hand weakness    Biomechanical lesion    Myalgia    Derangement of right shoulder joint    Morbid (severe) obesity due to excess calories ([facility])    Dysuria    Sore throat    Angiomyolipoma of right kidney    PVD (peripheral vascular disease) with claudication    Smokers' cough ([facility])    Subconjunctival hemorrhage of left eye    Lacunar stroke ([facility])    Severe headache    Memory problem    Rectal polyp

## 2025-08-12 PROBLEM — I63.81 LACUNAR STROKE (HCC): Status: RESOLVED | Noted: 2023-10-07 | Resolved: 2025-08-12

## 2025-08-12 PROBLEM — E66.01 MORBID (SEVERE) OBESITY DUE TO EXCESS CALORIES (HCC): Status: RESOLVED | Noted: 2022-06-20 | Resolved: 2025-08-12

## 2025-08-12 PROBLEM — E66.01 CLASS 2 SEVERE OBESITY DUE TO EXCESS CALORIES WITH SERIOUS COMORBIDITY AND BODY MASS INDEX (BMI) OF 39.0 TO 39.9 IN ADULT (HCC): Status: RESOLVED | Noted: 2021-07-28 | Resolved: 2025-08-12

## 2025-08-12 PROBLEM — E66.812 CLASS 2 SEVERE OBESITY DUE TO EXCESS CALORIES WITH SERIOUS COMORBIDITY AND BODY MASS INDEX (BMI) OF 39.0 TO 39.9 IN ADULT (HCC): Status: RESOLVED | Noted: 2021-07-28 | Resolved: 2025-08-12

## 2025-08-14 ENCOUNTER — NURSE TRIAGE (OUTPATIENT)
Dept: FAMILY MEDICINE CLINIC | Facility: CLINIC | Age: 70
End: 2025-08-14

## 2025-08-19 ENCOUNTER — OFFICE VISIT (OUTPATIENT)
Dept: FAMILY MEDICINE CLINIC | Facility: CLINIC | Age: 70
End: 2025-08-19

## 2025-08-19 ENCOUNTER — LAB ENCOUNTER (OUTPATIENT)
Dept: LAB | Age: 70
End: 2025-08-19
Attending: FAMILY MEDICINE

## 2025-08-19 ENCOUNTER — RESULTS FOLLOW-UP (OUTPATIENT)
Dept: FAMILY MEDICINE CLINIC | Facility: CLINIC | Age: 70
End: 2025-08-19

## 2025-08-19 VITALS
DIASTOLIC BLOOD PRESSURE: 71 MMHG | BODY MASS INDEX: 30 KG/M2 | SYSTOLIC BLOOD PRESSURE: 120 MMHG | WEIGHT: 174 LBS | HEART RATE: 62 BPM

## 2025-08-19 DIAGNOSIS — M51.16 LUMBAR DISC HERNIATION WITH RADICULOPATHY: ICD-10-CM

## 2025-08-19 DIAGNOSIS — E55.9 VITAMIN D DEFICIENCY: ICD-10-CM

## 2025-08-19 DIAGNOSIS — Z00.00 MEDICARE ANNUAL WELLNESS VISIT, INITIAL: Primary | ICD-10-CM

## 2025-08-19 DIAGNOSIS — N39.46 MIXED STRESS AND URGE URINARY INCONTINENCE: ICD-10-CM

## 2025-08-19 DIAGNOSIS — G43.109 MIGRAINE WITH AURA AND WITHOUT STATUS MIGRAINOSUS, NOT INTRACTABLE: ICD-10-CM

## 2025-08-19 DIAGNOSIS — I10 ESSENTIAL HYPERTENSION: ICD-10-CM

## 2025-08-19 DIAGNOSIS — Z86.73 HISTORY OF CVA (CEREBROVASCULAR ACCIDENT): ICD-10-CM

## 2025-08-19 DIAGNOSIS — E66.01 CLASS 2 SEVERE OBESITY DUE TO EXCESS CALORIES WITH SERIOUS COMORBIDITY AND BODY MASS INDEX (BMI) OF 39.0 TO 39.9 IN ADULT (HCC): ICD-10-CM

## 2025-08-19 DIAGNOSIS — E66.812 CLASS 2 SEVERE OBESITY DUE TO EXCESS CALORIES WITH SERIOUS COMORBIDITY AND BODY MASS INDEX (BMI) OF 39.0 TO 39.9 IN ADULT (HCC): ICD-10-CM

## 2025-08-19 DIAGNOSIS — E78.00 PURE HYPERCHOLESTEROLEMIA: ICD-10-CM

## 2025-08-19 DIAGNOSIS — F17.210 CIGARETTE NICOTINE DEPENDENCE WITHOUT COMPLICATION: ICD-10-CM

## 2025-08-19 DIAGNOSIS — K63.5 POLYP OF COLON, UNSPECIFIED PART OF COLON, UNSPECIFIED TYPE: ICD-10-CM

## 2025-08-19 DIAGNOSIS — M54.41 CHRONIC BILATERAL LOW BACK PAIN WITH BILATERAL SCIATICA: ICD-10-CM

## 2025-08-19 DIAGNOSIS — I73.9 PVD (PERIPHERAL VASCULAR DISEASE) WITH CLAUDICATION: ICD-10-CM

## 2025-08-19 DIAGNOSIS — E04.1 THYROID NODULE: ICD-10-CM

## 2025-08-19 DIAGNOSIS — M80.00XA AGE-RELATED OSTEOPOROSIS WITH CURRENT PATHOLOGICAL FRACTURE, INITIAL ENCOUNTER: ICD-10-CM

## 2025-08-19 DIAGNOSIS — Z91.81 RISK FOR FALLS: ICD-10-CM

## 2025-08-19 DIAGNOSIS — K59.01 SLOW TRANSIT CONSTIPATION: ICD-10-CM

## 2025-08-19 DIAGNOSIS — G89.29 CHRONIC BILATERAL LOW BACK PAIN WITH BILATERAL SCIATICA: ICD-10-CM

## 2025-08-19 DIAGNOSIS — M54.42 CHRONIC BILATERAL LOW BACK PAIN WITH BILATERAL SCIATICA: ICD-10-CM

## 2025-08-19 DIAGNOSIS — E11.9 TYPE 2 DIABETES MELLITUS WITHOUT COMPLICATION, WITHOUT LONG-TERM CURRENT USE OF INSULIN (HCC): ICD-10-CM

## 2025-08-19 DIAGNOSIS — Z12.11 COLON CANCER SCREENING: ICD-10-CM

## 2025-08-19 DIAGNOSIS — Z12.31 ENCOUNTER FOR SCREENING MAMMOGRAM FOR MALIGNANT NEOPLASM OF BREAST: ICD-10-CM

## 2025-08-19 LAB
ALBUMIN SERPL-MCNC: 4.6 G/DL (ref 3.2–4.8)
ALBUMIN/GLOB SERPL: 1.6 (ref 1–2)
ALP LIVER SERPL-CCNC: 98 U/L (ref 55–142)
ALT SERPL-CCNC: 17 U/L (ref 10–49)
ANION GAP SERPL CALC-SCNC: 5 MMOL/L (ref 0–18)
AST SERPL-CCNC: 19 U/L (ref ?–34)
BASOPHILS # BLD AUTO: 0.06 X10(3) UL (ref 0–0.2)
BASOPHILS NFR BLD AUTO: 0.8 %
BILIRUB SERPL-MCNC: 0.5 MG/DL (ref 0.2–1.1)
BILIRUB UR QL: NEGATIVE
BUN BLD-MCNC: 21 MG/DL (ref 9–23)
BUN/CREAT SERPL: 26.9 (ref 10–20)
CALCIUM BLD-MCNC: 9.9 MG/DL (ref 8.7–10.4)
CHLORIDE SERPL-SCNC: 104 MMOL/L (ref 98–112)
CHOLEST SERPL-MCNC: 168 MG/DL (ref ?–200)
CLARITY UR: CLEAR
CO2 SERPL-SCNC: 30 MMOL/L (ref 21–32)
CREAT BLD-MCNC: 0.78 MG/DL (ref 0.55–1.02)
CREAT UR-SCNC: 72.1 MG/DL
DEPRECATED RDW RBC AUTO: 44.9 FL (ref 35.1–46.3)
EGFRCR SERPLBLD CKD-EPI 2021: 81 ML/MIN/1.73M2 (ref 60–?)
EOSINOPHIL # BLD AUTO: 0.19 X10(3) UL (ref 0–0.7)
EOSINOPHIL NFR BLD AUTO: 2.5 %
ERYTHROCYTE [DISTWIDTH] IN BLOOD BY AUTOMATED COUNT: 13.2 % (ref 11–15)
FASTING PATIENT LIPID ANSWER: YES
FASTING STATUS PATIENT QL REPORTED: YES
GLOBULIN PLAS-MCNC: 2.9 G/DL (ref 2–3.5)
GLUCOSE BLD-MCNC: 129 MG/DL (ref 70–99)
GLUCOSE UR-MCNC: >1000 MG/DL
HCT VFR BLD AUTO: 39.8 % (ref 35–48)
HDLC SERPL-MCNC: 60 MG/DL (ref 40–59)
HEMOGLOBIN A1C: 7 % (ref 4.3–5.6)
HGB BLD-MCNC: 12.5 G/DL (ref 12–16)
IMM GRANULOCYTES # BLD AUTO: 0.01 X10(3) UL (ref 0–1)
IMM GRANULOCYTES NFR BLD: 0.1 %
KETONES UR-MCNC: NEGATIVE MG/DL
LDLC SERPL CALC-MCNC: 89 MG/DL (ref ?–100)
LEUKOCYTE ESTERASE UR QL STRIP.AUTO: NEGATIVE
LYMPHOCYTES # BLD AUTO: 2.3 X10(3) UL (ref 1–4)
LYMPHOCYTES NFR BLD AUTO: 30 %
MCH RBC QN AUTO: 29.1 PG (ref 26–34)
MCHC RBC AUTO-ENTMCNC: 31.4 G/DL (ref 31–37)
MCV RBC AUTO: 92.6 FL (ref 80–100)
MICROALBUMIN UR-MCNC: <0.3 MG/DL
MONOCYTES # BLD AUTO: 0.69 X10(3) UL (ref 0.1–1)
MONOCYTES NFR BLD AUTO: 9 %
NEUTROPHILS # BLD AUTO: 4.41 X10 (3) UL (ref 1.5–7.7)
NEUTROPHILS # BLD AUTO: 4.41 X10(3) UL (ref 1.5–7.7)
NEUTROPHILS NFR BLD AUTO: 57.6 %
NITRITE UR QL STRIP.AUTO: NEGATIVE
NONHDLC SERPL-MCNC: 108 MG/DL (ref ?–130)
OSMOLALITY SERPL CALC.SUM OF ELEC: 293 MOSM/KG (ref 275–295)
PH UR: 5.5 (ref 5–8)
PLATELET # BLD AUTO: 244 10(3)UL (ref 150–450)
POTASSIUM SERPL-SCNC: 4 MMOL/L (ref 3.5–5.1)
PROT SERPL-MCNC: 7.5 G/DL (ref 5.7–8.2)
PROT UR-MCNC: NEGATIVE MG/DL
RBC # BLD AUTO: 4.3 X10(6)UL (ref 3.8–5.3)
SODIUM SERPL-SCNC: 139 MMOL/L (ref 136–145)
SP GR UR STRIP: >1.03 (ref 1–1.03)
TRIGL SERPL-MCNC: 103 MG/DL (ref 30–149)
TSI SER-ACNC: 0.61 UIU/ML (ref 0.55–4.78)
UROBILINOGEN UR STRIP-ACNC: NORMAL
VIT D+METAB SERPL-MCNC: 70.8 NG/ML (ref 30–100)
VLDLC SERPL CALC-MCNC: 17 MG/DL (ref 0–30)
WBC # BLD AUTO: 7.7 X10(3) UL (ref 4–11)

## 2025-08-19 PROCEDURE — 84443 ASSAY THYROID STIM HORMONE: CPT

## 2025-08-19 PROCEDURE — 82043 UR ALBUMIN QUANTITATIVE: CPT

## 2025-08-19 PROCEDURE — 81001 URINALYSIS AUTO W/SCOPE: CPT

## 2025-08-19 PROCEDURE — G0439 PPPS, SUBSEQ VISIT: HCPCS | Performed by: FAMILY MEDICINE

## 2025-08-19 PROCEDURE — 99499 UNLISTED E&M SERVICE: CPT | Performed by: FAMILY MEDICINE

## 2025-08-19 PROCEDURE — 99214 OFFICE O/P EST MOD 30 MIN: CPT | Performed by: FAMILY MEDICINE

## 2025-08-19 PROCEDURE — 82306 VITAMIN D 25 HYDROXY: CPT

## 2025-08-19 PROCEDURE — 96160 PT-FOCUSED HLTH RISK ASSMT: CPT | Performed by: FAMILY MEDICINE

## 2025-08-19 PROCEDURE — 82570 ASSAY OF URINE CREATININE: CPT

## 2025-08-19 PROCEDURE — 36415 COLL VENOUS BLD VENIPUNCTURE: CPT

## 2025-08-19 PROCEDURE — 80053 COMPREHEN METABOLIC PANEL: CPT

## 2025-08-19 PROCEDURE — 83036 HEMOGLOBIN GLYCOSYLATED A1C: CPT | Performed by: FAMILY MEDICINE

## 2025-08-19 PROCEDURE — 80061 LIPID PANEL: CPT

## 2025-08-19 PROCEDURE — 85025 COMPLETE CBC W/AUTO DIFF WBC: CPT

## 2025-08-19 RX ORDER — EMPAGLIFLOZIN 25 MG/1
25 TABLET, FILM COATED ORAL DAILY
Qty: 90 TABLET | Refills: 3 | Status: SHIPPED | OUTPATIENT
Start: 2025-08-19

## 2025-08-19 RX ORDER — VARENICLINE TARTRATE 1 MG/1
1 TABLET, FILM COATED ORAL 2 TIMES DAILY
Qty: 60 TABLET | Refills: 6 | Status: SHIPPED | OUTPATIENT
Start: 2025-08-19 | End: 2026-03-17

## 2025-08-19 RX ORDER — LOSARTAN POTASSIUM AND HYDROCHLOROTHIAZIDE 12.5; 1 MG/1; MG/1
1 TABLET ORAL DAILY
Qty: 90 TABLET | Refills: 3 | Status: SHIPPED | OUTPATIENT
Start: 2025-08-19

## 2025-08-19 RX ORDER — ASPIRIN 81 MG/1
81 TABLET, CHEWABLE ORAL DAILY
Qty: 90 TABLET | Refills: 3 | Status: SHIPPED | OUTPATIENT
Start: 2025-08-19 | End: 2026-08-14

## 2025-08-19 RX ORDER — ATORVASTATIN CALCIUM 10 MG/1
10 TABLET, FILM COATED ORAL EVERY 24 HOURS
Qty: 90 TABLET | Refills: 3 | Status: SHIPPED | OUTPATIENT
Start: 2025-08-19

## 2025-08-19 RX ORDER — METFORMIN HYDROCHLORIDE 750 MG/1
750 TABLET, EXTENDED RELEASE ORAL DAILY
Qty: 90 TABLET | Refills: 3 | Status: SHIPPED | OUTPATIENT
Start: 2025-08-19

## 2025-08-19 RX ORDER — NICOTINE 21 MG/24HR
1 PATCH, TRANSDERMAL 24 HOURS TRANSDERMAL EVERY 24 HOURS
Qty: 30 EACH | Refills: 5 | Status: SHIPPED | OUTPATIENT
Start: 2025-08-19

## 2025-08-19 RX ORDER — ALENDRONATE SODIUM 70 MG/1
70 TABLET ORAL
Qty: 12 TABLET | Refills: 3 | Status: SHIPPED | OUTPATIENT
Start: 2025-08-19

## 2025-08-19 RX ORDER — BLOOD SUGAR DIAGNOSTIC
1 STRIP MISCELLANEOUS DAILY
Qty: 100 EACH | Refills: 3 | Status: SHIPPED | OUTPATIENT
Start: 2025-08-19

## 2025-08-21 ENCOUNTER — OFFICE VISIT (OUTPATIENT)
Dept: PHYSICAL MEDICINE AND REHAB | Facility: CLINIC | Age: 70
End: 2025-08-21

## 2025-08-21 VITALS — WEIGHT: 174 LBS | RESPIRATION RATE: 14 BRPM | HEIGHT: 64 IN | BODY MASS INDEX: 29.71 KG/M2

## 2025-08-21 DIAGNOSIS — M54.16 LUMBAR RADICULOPATHY: ICD-10-CM

## 2025-08-21 DIAGNOSIS — M47.816 LUMBAR SPONDYLOSIS: Primary | ICD-10-CM

## 2025-08-21 DIAGNOSIS — M51.369 BULGE OF LUMBAR DISC WITHOUT MYELOPATHY: ICD-10-CM

## 2025-08-21 DIAGNOSIS — M54.59 MECHANICAL LOW BACK PAIN: ICD-10-CM

## 2025-08-21 DIAGNOSIS — M51.372 DEGENERATION OF INTERVERTEBRAL DISC OF LUMBOSACRAL REGION WITH DISCOGENIC BACK PAIN AND LOWER EXTREMITY PAIN: ICD-10-CM

## 2025-08-21 DIAGNOSIS — M48.061 LUMBAR FORAMINAL STENOSIS: ICD-10-CM

## 2025-08-21 DIAGNOSIS — M48.062 SPINAL STENOSIS OF LUMBAR REGION WITH NEUROGENIC CLAUDICATION: ICD-10-CM

## 2025-08-21 DIAGNOSIS — M47.816 FACET SYNDROME, LUMBAR: ICD-10-CM

## 2025-08-21 PROCEDURE — 99214 OFFICE O/P EST MOD 30 MIN: CPT | Performed by: PHYSICAL MEDICINE & REHABILITATION

## 2025-08-21 PROCEDURE — 1159F MED LIST DOCD IN RCRD: CPT | Performed by: PHYSICAL MEDICINE & REHABILITATION

## 2025-08-21 PROCEDURE — 1125F AMNT PAIN NOTED PAIN PRSNT: CPT | Performed by: PHYSICAL MEDICINE & REHABILITATION

## 2025-08-21 PROCEDURE — 1160F RVW MEDS BY RX/DR IN RCRD: CPT | Performed by: PHYSICAL MEDICINE & REHABILITATION

## 2025-08-21 PROCEDURE — 3008F BODY MASS INDEX DOCD: CPT | Performed by: PHYSICAL MEDICINE & REHABILITATION

## 2025-08-22 ENCOUNTER — TELEPHONE (OUTPATIENT)
Facility: CLINIC | Age: 70
End: 2025-08-22

## 2025-08-25 ENCOUNTER — TELEPHONE (OUTPATIENT)
Dept: PHYSICAL MEDICINE AND REHAB | Facility: CLINIC | Age: 70
End: 2025-08-25

## 2025-08-25 DIAGNOSIS — M47.816 FACET SYNDROME, LUMBAR: Primary | ICD-10-CM

## 2025-08-25 DIAGNOSIS — M47.816 LUMBAR SPONDYLOSIS: ICD-10-CM

## (undated) DEVICE — ENCORE® LATEX ACCLAIM SIZE 7.5, STERILE LATEX POWDER-FREE SURGICAL GLOVE: Brand: ENCORE

## (undated) DEVICE — CANNULA 7MM TWIST AR-6570

## (undated) DEVICE — RETRIEVAL BALLOON CATHETER: Brand: EXTRACTOR™ PRO RX

## (undated) DEVICE — Device

## (undated) DEVICE — CHLORAPREP 26ML APPLICATOR

## (undated) DEVICE — SUCTION TIP FRAZIER 8 FR #2

## (undated) DEVICE — ABDOMINAL PAD: Brand: CURITY

## (undated) DEVICE — SOL  .9 1000ML BTL

## (undated) DEVICE — STERILE LATEX POWDER-FREE SURGICAL GLOVESWITH NITRILE COATING: Brand: PROTEXIS

## (undated) DEVICE — SUTURE ETHILON 4-0 662G

## (undated) DEVICE — GIJAW SINGLE-USE BIOPSY FORCEPS WITH NEEDLE: Brand: GIJAW

## (undated) DEVICE — INTENDED FOR TISSUE SEPARATION, AND OTHER PROCEDURES THAT REQUIRE A SHARP SURGICAL BLADE TO PUNCTURE OR CUT.: Brand: BARD-PARKER ® STAINLESS STEEL BLADES

## (undated) DEVICE — TUBING IRR 16FT CNT WV 3 ASCP

## (undated) DEVICE — DRAPE SRG 120X76IN SHLDR ASCP

## (undated) DEVICE — SET EXTN 2.7ML TBNG L20IN DIA0.100IN MACBOR

## (undated) DEVICE — BLADE SHVR COOLCUT 13CM 4MM

## (undated) DEVICE — LINE MNTR ADLT SET O2 INTMD

## (undated) DEVICE — SUTURE VICRYL 4-0 RB-1

## (undated) DEVICE — SUTURE VICRYL 5-0 P-3

## (undated) DEVICE — 3.0MM NEURO (MATCH HEAD) SOFT TOUCH

## (undated) DEVICE — 3M™ MICROFOAM™ TAPE 1528-4: Brand: 3M™ MICROFOAM™

## (undated) DEVICE — KIT ENDO ORCAPOD 160/180/190

## (undated) DEVICE — YANKAUER SUCTION INSTRUMENT NO CONTROL VENT, BULB TIP, CLEAR: Brand: YANKAUER

## (undated) DEVICE — ENCORE® LATEX MICRO SIZE 8, STERILE LATEX POWDER-FREE SURGICAL GLOVE: Brand: ENCORE

## (undated) DEVICE — GAUZE SPONGES,12 PLY: Brand: CURITY

## (undated) DEVICE — ENCORE® LATEX MICRO SIZE 7.5, STERILE LATEX POWDER-FREE SURGICAL GLOVE: Brand: ENCORE

## (undated) DEVICE — TOWEL SURG OR 17X30IN BLUE

## (undated) DEVICE — KIT TRC TRIMANO BEACH CHR ARM

## (undated) DEVICE — DRAPE ARTHROSCOPY KNEE

## (undated) DEVICE — DISSECTOR ESCP COOLCUT 4MM CRV

## (undated) DEVICE — DECANTER SPIKE TRANSFLOW

## (undated) DEVICE — SNAP KOVER: Brand: UNBRANDED

## (undated) DEVICE — 3M™ TEGADERM™ TRANSPARENT FILM DRESSING, 1626W, 4 IN X 4-3/4 IN (10 CM X 12 CM), 50 EACH/CARTON, 4 CARTON/CASE: Brand: 3M™ TEGADERM™

## (undated) DEVICE — NEEDLE SCORPION AR-13995N

## (undated) DEVICE — SOL  .9 3000ML

## (undated) DEVICE — CONMED SCOPE SAVER BITE BLOCK, 20X27 MM: Brand: SCOPE SAVER

## (undated) DEVICE — TRAY SKIN PREP PVP-1

## (undated) DEVICE — DERMABOND LIQUID ADHESIVE

## (undated) DEVICE — GAMMEX® NON-LATEX PI ORTHO SIZE 7.5, STERILE POLYISOPRENE POWDER-FREE SURGICAL GLOVE: Brand: GAMMEX

## (undated) DEVICE — ZIMMER® STERILE DISPOSABLE TOURNIQUET CUFF WITH PLC, DUAL PORT, SINGLE BLADDER, 34 IN. (86 CM)

## (undated) DEVICE — DRAPE SHEET LAPAROTOMY

## (undated) DEVICE — NEEDLE HYPO 16GA L1.5IN PUR REG BVL WRLD

## (undated) DEVICE — ARTHROSCOPY: Brand: MEDLINE INDUSTRIES, INC.

## (undated) DEVICE — CONTAINER UBG 200ML POR CLTH

## (undated) DEVICE — DRAIN SILICONE RND 1/8

## (undated) DEVICE — SUTURE SILK 0

## (undated) DEVICE — KIT CLEAN ENDOKIT 1.1OZ GOWNX2

## (undated) DEVICE — ADHESIVE MASTISOL 2/3CC VL

## (undated) DEVICE — NEEDLE SPNL 22GA L3.5IN BLK QNCKE STYL DISP

## (undated) DEVICE — SYRINGE MED 10ML LL TIP W/O SFTY DISP

## (undated) DEVICE — APPLICATOR PREP 10.5ML ORNG CHG 2% ISO ALC

## (undated) DEVICE — STERILE POLYISOPRENE POWDER-FREE SURGICAL GLOVES: Brand: PROTEXIS

## (undated) DEVICE — DRESSING BIOPATCH 1X4 CNTR

## (undated) DEVICE — SPINOCAN® 18 GA. X 3-1/2 IN. (90 MM) SPINAL NEEDLE: Brand: SPINOCAN®

## (undated) DEVICE — MICRO KOVER: Brand: UNBRANDED

## (undated) DEVICE — COVER STND 54X23IN MAYO REINF

## (undated) DEVICE — 1010 S-DRAPE TOWEL DRAPE 10/BX: Brand: STERI-DRAPE™

## (undated) DEVICE — TAPE POROUS CLOTH 3X10 YD

## (undated) DEVICE — BURR SHVR COOLCUT 13CM 4MM 8

## (undated) DEVICE — CERVICAL CDS: Brand: MEDLINE INDUSTRIES, INC.

## (undated) DEVICE — MEDI-VAC NON-CONDUCTIVE SUCTION TUBING: Brand: CARDINAL HEALTH

## (undated) DEVICE — SHOULDER ARTHROSCOPY: Brand: MEDLINE INDUSTRIES, INC.

## (undated) DEVICE — OCCLUSIVE GAUZE STRIP,3% BISMUTH TRIBROMOPHENATE IN PETROLATUM BLEND: Brand: XEROFORM

## (undated) DEVICE — IMMOBILIZER ORTH MED UNV

## (undated) DEVICE — MEDI-VAC NON-CONDUCTIVE SUCTION TUBING 6MM X 1.8M (6FT.) L: Brand: CARDINAL HEALTH

## (undated) DEVICE — NEEDLE HYPO 27GA L1.5IN REG BVL W/O SFTY

## (undated) DEVICE — BLADE SHVR 13CM 4MM EXCLBR

## (undated) DEVICE — LOCKING DEVICE RX & BIOPSY CAP

## (undated) DEVICE — CANNULA 8.5MM TWIST AR-6530

## (undated) DEVICE — 3M™ STERI-DRAPE™ INCISE DRAPE 1050 (60CM X 45CM): Brand: STERI-DRAPE™

## (undated) DEVICE — 60 ML SYRINGE REGULAR TIP: Brand: MONOJECT

## (undated) DEVICE — 4.0MM BARREL

## (undated) DEVICE — HIGH PERFORMANCE GUIDEWIRE: Brand: DREAMWIRE

## (undated) DEVICE — NVM5 MULTIMODALITY SURFACE KIT

## (undated) DEVICE — DRAPE SHEET LG

## (undated) DEVICE — 3M™ STERI-STRIP™ REINFORCED ADHESIVE SKIN CLOSURES, R1547, 1/2 IN X 4 IN (12 MM X 100 MM), 6 STRIPS/ENVELOPE: Brand: 3M™ STERI-STRIP™

## (undated) DEVICE — V2 SPECIMEN COLLECTION MANIFOLD KIT: Brand: NEPTUNE

## (undated) DEVICE — SPK10022 SCHLEIN POSITIONING KIT: Brand: SPK10022 SCHLEIN POSITIONING KIT

## (undated) DEVICE — SUCTION CANISTER, 3000CC,SAFELINER: Brand: DEROYAL

## (undated) DEVICE — STERILE LATEX POWDER-FREE SURGICAL GLOVES WITH HYDROGEL COATING, SMOOTH FINISH, STRAIGHT FINGER: Brand: PROTEXIS

## (undated) DEVICE — ENCORE® LATEX MICRO SIZE 7, STERILE LATEX POWDER-FREE SURGICAL GLOVE: Brand: ENCORE

## (undated) DEVICE — 60 ML SYRINGE LUER-LOCK TIP: Brand: MONOJECT

## (undated) DEVICE — PEN 6" SURGICAL MARKING PURPL

## (undated) DEVICE — FORCEP CUSH BAY BP DISP 7.5IN

## (undated) DEVICE — BANDAGE ROLL,100% COTTON, 6 PLY, LARGE: Brand: KERLIX

## (undated) DEVICE — WRAP COOLING SHLDR W/ICE PILLO

## (undated) DEVICE — CANNULA 5.75 CLEAR AR-6560

## (undated) DEVICE — Device: Brand: DEFENDO AIR/WATER/SUCTION AND BIOPSY VALVE

## (undated) DEVICE — SUTURE VICRYL 3-0 J497G

## (undated) DEVICE — DRAIN RESERVOIR RELIAVAC 100CC

## (undated) DEVICE — 3M™ STERI-DRAPE™ U-DRAPE 1015: Brand: STERI-DRAPE™

## (undated) DEVICE — SPHINCTEROTOME: Brand: DREAMTOME™ RX 44

## (undated) DEVICE — Device: Brand: CUSTOM PROCEDURE KIT

## (undated) NOTE — LETTER
7/22/2019              Page Pritchett        [address] [facility] [address]         ABOVE PATIENT SEEN/EXAMINED TODAY AT [facility] OFFICE. SHE'S BEING TREATED AND MAY RETURN TO WORK Wednesday 7/24 WITHOUT RESTRICTIONS.       Sincerely,    Clark Liu

## (undated) NOTE — LETTER
10/5/2020              Page Pritchett        [address] [facility] [address]         ABOVE PATIENT SEEN/EXAMINED TODAY AT [facility] OFFICE FOR A PREOPERATIVE PHYSICAL PENDING RIGHT TKA 10/19/20.   HER RECENT LAB WORK, CXR, AND PHARMACOLOGIC STR

## (undated) NOTE — LETTER
Dear Radha Hammer:    IF YOU'RE 46 OR OLDER, GETTING A COLORECTAL CANCER SCREENING TEST COULD SAVE YOUR LIFE    Colorectal cancer is the second leading cancer killer in the [place]. Polyps and colorectal cancer does not always cause symptoms.   That's why

## (undated) NOTE — LETTER
Dear Casimiro Merritt:    Diabetes is a serious chronic disease that requires regular visits with your doctor to monitor your condition.  There are five tests that are the cornerstones for monitoring your overall health with diabetes and developing a managemen

## (undated) NOTE — LETTER
8/12/2020              Page Pritchett        [address] [facility] [address]         To Whom it may concern:     This is to certify that Mariah Wilson had an appointment on 8/12/2020 with [facility]. She will need to remain q

## (undated) NOTE — LETTER
8/24/2020              Pierre Moran        [address] [address]         To:   [employer]   Fax [phone]    From:  Dr. Jona Ibarra had a virtual visit with above patient re:  migranous headache

## (undated) NOTE — LETTER
9/29/2020              Page Pritchett        [address] [place] [address]         To Whom it May Concern,    Ms. Malcolm Richardson underwent a medical procedure on 9/29/2020 and will need to be off this week to recover.     She is cleared to

## (undated) NOTE — LETTER
10/5/2020              Page Pritchett        [address] [facility] [address]         ABOVE PATIENT SEEN/EXAMINED AT [facility] OFFICE TODAY. MAY RETURN TO WORK TOMORROW WITHOUT RESTRICTIONS.       Sincerely,    Myah Ferrer,   [facility]

## (undated) NOTE — LETTER
10/5/2020              Anselmo Mark        [address] [facility] [address]         Dear Mateus Dean,      Sincerely,    Hanna Dill,   [address] [place] [address]  [address] [facility] [id number]

## (undated) NOTE — LETTER
Patient Name: Page Pritchett : 1955  Medical Record #: [id number] Printed: 7/15/2025  Page 1 of 2                                          [facility]  [address], [place], [place]  Autorización para operación y procedimiento quirúrgico                                                                                                      Por la presente, autorizo a BeharShaheen MD, mi médico y al asistente a realizar la operación/procedimiento quirúrgico a continuación, así sigifredo a administrar la anestesia que determine necesaria mi médico Nombre (s) de la operación/procedimiento: Caudal Epidural Steroid Injection en Pageblaine Pritchett     Reconozco que kim la operación/procedimiento quirúrgico, las condiciones imprevistas pueden requerir de procedimientos adicionales o diferentes a aquellos mencionados anteriormente.  Por lo tanto, autorizo y solicito además que el cirujano antes mencionado, los asistentes o las personas designadas realicen los procedimientos que, a brown juicio, ino necesarios y convenientes.    Mi cirujano/médico moreno discutido antes de mi cirugía los posibles beneficios, riesgos y efectos secundarios de lalo procedimiento, la probabilidad de alcanzar las metas y los posibles problemas que puedan ocurrir kim la recuperación.  Ellos también casanova discutido las alternativas razonables al procedimiento, incluso los riesgos, beneficios y efectos secundarios relacionados con las alternativas y los riesgos relacionados con no realizar lalo procedimiento.  Casanova respondido a todas mis preguntas y confirmo que no se ha dado ninguna garantía en cuanto a los resultados que pueda obtener.    En carlos de que surja la necesidad kim mi operación o kim el periodo postoperatorio, también autorizo se aplique cody y/o productos sanguíneos.  Asimismo, entiendo que a pesar de las cuidadosas pruebas y análisis de cody o de los productos sanguíneos que realizan las entidades  recolectoras, todavía puedo estar sujeto a efectos adversos sigifredo resultado de recibir trena transfusión de cody y/o productos sanguíneos.  A continuación se mencionan algunos, aunque no todos, los riesgos potenciales que pueden ocurrir: fiebre y reacciones alérgicas, reacciones hemolíticas, trasmisión de enfermedades sigifredo hepatitis, SIDA y citomegalovirus (CMV), así sigifredo sobrecarga de líquidos.   En carlos de que desee tener trena transfusión autóloga de mi propia cody o trena transfusión de un donante dirigido.  Lo discutiré con mi médico.  Check only if Refusing Blood or Blood Products  I understand refusal of blood or blood products as deemed necessary by my physician may have serious consequences to my condition to include possible death. I hereby assume responsibility for my refusal and release the hospital, its personnel, and my physicians from any responsibility for the consequences of my refusal.           o  Refuse       Autorizo el uso de cualquier muestra, órgano, tejido, parte del cuerpo u objeto extraño que pueda ser extraído de mi cuerpo kim la operación/procedimiento para fines de diagnóstico, investigación o de enseñanza y brown desecho posterior por las autoridades del hospital.  También, autorizo la revelación de los resultados de las pruebas de muestras y/o los informes escritos al médico tratante sigifredo personal médico del hospital u otros médicos de referencia o consulta involucrados en mi atención, a discreción del patólogo o de mi médico tratante.    Doy consentimiento para que se fotografíen o graben vídeos de las operaciones o procedimientos a realizarse, incluidas las partes de mi cuerpo que ino adecuadas para propósitos médicos, científicos o educativos, en el entendido de que, mi identidad no sea revelada por las fotografías o por textos descriptivos que las acompañen.  Si el procedimiento es fotografiado o grabado en vídeo, el cirujano obtendrá la imagen, cinta de vídeo o CD original.  El  hospital no se hará responsable por el almacenamiento, la revelación o el mantenimiento de la imagen, cinta o CD.    Autorizo la presencia de un especialista de producto u observadores en el quirófano, según lo considere necesario mi médico o las personas que éste designe.     Reconozco que en carlos de que mi procedimiento resulte en un tiempo prolongado de radiografía/fluoroscopia, puedo desarrollar trena reacción en la piel.    Si tengo trena orden de No intentar la reanimación (WARNER), dulce maria estado se suspenderá mientras esté en el quirófano, la nina de procedimientos y kim el periodo de recuperación a menos que yo indique lo contrario explícitamente (o trena persona autorizada a kian el consentimiento en mi nombre). El cirujano o mi médico tratante determinarán cuándo termina el periodo de recuperación aplicable a los efectos de restablecer la orden de WARNER.  Pacientes que se realizan un procedimiento de esterilización: Entiendo que si el procedimiento tiene éxito, los resultados serán permanentes y que, por lo tanto, me será imposible inseminar, concebir o tener hijos.  Asimismo, entiendo que el procedimiento tiene sigifredo propósito la esterilidad, aunque el resultado no está garantizado.   Admito que mi médico me ha explicado la aplicación de sedación/analgesia, incluidos los riesgos y beneficios y, consiento a la administración de sedación/analgesia conforme sea necesario o conveniente a juicio de mi médico.      CERTIFICO QUE HE LEÍDO Y COMPRENDIDO EL CONSENTIMIENTO ANTERIOR PARA LA OPERACIÓN y/o PROCEDIMIENTO.             ______________________________________  _______________________________  Firma del paciente      Firma de la persona responsible  Signature of patient      Signature of responsible person                        ___________________________________                                   Nombre en imprenta de la persona responsible         Name of responsible  person          ___________________________________                 Relación con el paciente         Relationship to patient    _________________________________________  ______________ ________________  Firma del testigo          Fecha / Date Hora / Time  Signature of witness    DECLARACÓN DEL MÉDICO Mediante mi firma al calce, afirmo que antes de la hora del procedimiento, he explicadoal paciente y/o a brown representante legal, los riesgos y beneficios involucrados en el tratamiento propuesto, así comocualquier alternativa razonable al tratamiento propuesto. También le(s) he explicado los riesgos y beneficios involucradosen el rechazo del tratarniento propuesto y alternativas al tratamiento propuesto, y he respondido a las preguntas del(la) paciente(My signature below affirms that prior to the time of the procedure, I have explained to the patient and/or his/her guardian, therisks and benefits involved in the proposed treatment and any reasonable alternative to the proposed treatment. I have alsoexplained the risks and benefits involved in refusal of the proposed treatment and have answered the patient's questions.)    ________________________________________   _________________________   _____________   (Firma del médico/Signature of Physician)                                    (Fecha/Date)                                             (Hora/Time)      Patient Name: Page Pritchett     : 1955                 Printed: 7/15/2025      Medical Record #: [id number]                                              Page 2 of 2

## (undated) NOTE — Clinical Note
TCM call completed. A TE was sent to the office for an appointment request. NCM contacted [facility] to confirm start of care. Thank you.

## (undated) NOTE — LETTER
7/8/2020          To Whom It May Concern:    Walter Bobby is currently under my medical care and may not return to work at this time. Please excuse Harika Kramer for 7/8/2020. She may return to work on 7/9/2020.     If you require additional information pl

## (undated) NOTE — LETTER
Date & Time: 6/20/2020, 1:12 PM  Patient: Prachi Pillai  Encounter Provider(s):    Odilon Ga MD       To Whom It May Concern:    Prachi Pillai was seen and treated in our department on 6/20/2020.  She should not return to work until 06/23/

## (undated) NOTE — LETTER
[facility]   Administracion de Anestesia  Yo, Page Pritchett, acepto ser atendido por un anestesiólogo, quien está especialmente capacitado para monitorearme y darme medicamento para hacerme dormir o mantenerme cómodo kim mi procedimiento.   Entiendo que mi anestesiólogo no es un empleado o agente de [facility]. Él o jocy trabaja para [facility]  Teresita el paciente que solicita los servicios de anestesia, estoy de acuerdo con lo siguiente:  Permitir al anestesiólogo (médico de anestesia) que me suministre el medicamento y hacer los procedimientos adicionales que ino necesarios. Algunos ejemplos son: Al iniciar o utilizar trena “IV” para suministrarme medicamentos, fluidos o cody kim mi procedimiento, y colocarme trena sonda de respiración, me ayudará a respirar cuando esté dormido (intubación). En el carlos de que mi corazón deje de funcionar correctamente, entiendo que mi anestesiólogo hará todo lo posible para salvar mi elba, a menos que los documentos de No resucitar de [facility] lo indiquen de otra manera.  Informar a mi anestesista antes del procedimiento:  Si estoy embarazada.  La última vez que comí o bebí algo.  Todos los medicamentos que jennifer (incluyendo medicamentos recetados, suplementos herbales y pastillas que puedo comprar sin receta médica (incluyendo drogas en la almodovar [medicamentos ilegales]). No informar a mi anestesiólogo acerca de estos medicamentos puede aumentar mi riesgo de complicaciones con la anestesia.  Si soy alérgico a cualquier cosa o he tenido previamente trena reacción adversa a la anestesia.  Entiendo cómo la anestesia me ayudará (beneficios).  Entiendo que con cualquier tipo de anestesia hay riesgos:  Los riesgos más comunes son: náuseas, vómitos, dolor de garganta, dolor muscular, daño a los ojos, la boca o los dientes (por la colocación de la sonda de respiración).  Los riesgos poco comunes incluyen: recordar  lo que sucedió kim mi procedimiento, reacciones alérgicas a los medicamentos, lesiones en las vías respiratorias, el corazón, los pulmones, la visión, los nervios o músculos, y en casos sumamente raros, la muerte.  Mii médico me ha explicado otras opciones de atención disponibles para mí (alternativas).  Pacientes embarazadas (“epidural”):    Entiendo que los riesgos de recibir trena inyección epidural (medicamento que se aplica en la espalda para ayudar a controlar el dolor kim el parto), incluyen picazón, presión arterial baja, dificultad para orinar, dolor de william o disminución en el ritmo del corazón del bebé. Los riesgos muy poco comunes incluyen infección, hemorragia, convulsiones, ritmo cardíaco irregular y lesión del nervio.  Anestesia regional (“columna vertebral”, “epidural” y “bloqueo de los nervios”):  Entiendo que hay complicaciones poco frecuentes blanca posibles, e incluyen dolor de william, sangrado, infección, convulsiones, ritmo cardíaco irregular y la lesión del nervio.  .   Puedo cambiar de opinión acerca de recibir servicios de anestesia en cualquier momento antes de que se me administre el medicamento.         Paciente (o representante) Firma/Relación con el paciente  Fecha  Hora  Patient Signature/Signature of Responsible person Date Time           Nombre del intérprete (en brown carlos)  Idioma/Organización  Hora  Name of  Language/Organization Time          Firma del anestesiólogo Fecha  Hora  Signature of anesthesiologist Date Time    He hablado del procedimiento y la información anterior con el paciente (o el representante del paciente) y respondí monty preguntas. El paciente o brown representante ha aceptado recibir los servicios de anestesia.         Testigo Fecha  Hora  Witness Date Time  He verificado que la firma es la del paciente o del representante del paciente, y que se firmó antes del procedimiento.    Nombre del paciente: Page valentin Nac.: 9/5/1955                  En letra de imprenta: February 12, 2025  Expediente médico No. [id number]                         Página 1 de 2  ----------ANESTHESIA CONSENT----------

## (undated) NOTE — LETTER
Patient Name: Page Pritchett  Surgery Date: 2/13/2025  Medical Record: [id number] CSN: [id number]      Surgeon(s):  Lalita Cervantes MD  Consent Procedure: COLONOSCOPY  Anesthesia Type: MAC    PATIENT AND FAMILY REQUESTING BOWEL PREP TO PHARMACY AND REVIEW OF INSTRUCTIONS Ukrainian AND ENGLISH.      THANK YOU     PRE ADMISSION

## (undated) NOTE — LETTER
Hospital Discharge Documentation  Please phone to schedule a hospital follow up appointment.     From: [address] Hospitalist's Office  Phone: [phone]    Patient discharged time/date: 8/18/2017  6:18 PM  Patient discharge disposition:  Home or Self acute stroke. Neurology was on consult. They felt this might of been a TIA. No embolic source seen on imaging of the neck or heart. 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   Neurology fe Commonly known as:  COZAAR      Take 100 mg by mouth daily.    Refills:  0     MetFORMIN HCl 500 MG Tabs  Commonly known as:  GLUCOPHAGE      Take 500 mg by mouth daily with breakfast.   Refills:  0     omeprazole 20 MG Cpdr  Commonly known as:  PRILOSEC ? Referrals as listed bleow in orders Assisted in scheduling required follow-up with community providers and services. Medication Reconciliation:  I am aware of an inpatient discharge within the last 30 days.   The discharge medication list has been olivia

## (undated) NOTE — LETTER
Patient Name: Page Pritchett : 1955  Medical Record #: [id number] Printed: 2025  Page 1 of 2                                          [facility]  [address], [place], [place]  Autorización para operación y procedimiento quirúrgico                                                                                                      Por la presente, autorizo a Lalita Cervantes MD, mi médico y al asistente a realizar la operación/procedimiento quirúrgico a continuación, así sigifredo a administrar la anestesia que determine necesaria mi médico Nombre (s) de la operación/procedimiento: COLONOSCOPY en Page Pritchett     Reconozco que kim la operación/procedimiento quirúrgico, las condiciones imprevistas pueden requerir de procedimientos adicionales o diferentes a aquellos mencionados anteriormente.  Por lo tanto, autorizo y solicito además que el cirujano antes mencionado, los asistentes o las personas designadas realicen los procedimientos que, a brown juicio, ino necesarios y convenientes.    Mi cirujano/médico moreno discutido antes de mi cirugía los posibles beneficios, riesgos y efectos secundarios de lalo procedimiento, la probabilidad de alcanzar las metas y los posibles problemas que puedan ocurrir kim la recuperación.  Ellos también casanova discutido las alternativas razonables al procedimiento, incluso los riesgos, beneficios y efectos secundarios relacionados con las alternativas y los riesgos relacionados con no realizar lalo procedimiento.  Casanova respondido a todas mis preguntas y confirmo que no se ha dado ninguna garantía en cuanto a los resultados que pueda obtener.    En carlos de que surja la necesidad kim mi operación o kim el periodo postoperatorio, también autorizo se aplique cody y/o productos sanguíneos.  Asimismo, entiendo que a pesar de las cuidadosas pruebas y análisis de cody o de los productos sanguíneos que realizan las entidades recolectoras, todavía puedo  estar sujeto a efectos adversos sigifredo resultado de recibir trena transfusión de cody y/o productos sanguíneos.  A continuación se mencionan algunos, aunque no todos, los riesgos potenciales que pueden ocurrir: fiebre y reacciones alérgicas, reacciones hemolíticas, trasmisión de enfermedades sigifredo hepatitis, SIDA y citomegalovirus (CMV), así sigifredo sobrecarga de líquidos.   En carlos de que desee tener trena transfusión autóloga de mi propia cody o trena transfusión de un donante dirigido.  Lo discutiré con mi médico.  Check only if Refusing Blood or Blood Products  I understand refusal of blood or blood products as deemed necessary by my physician may have serious consequences to my condition to include possible death. I hereby assume responsibility for my refusal and release the hospital, its personnel, and my physicians from any responsibility for the consequences of my refusal.           o  Refuse       Autorizo el uso de cualquier muestra, órgano, tejido, parte del cuerpo u objeto extraño que pueda ser extraído de mi cuerpo kim la operación/procedimiento para fines de diagnóstico, investigación o de enseñanza y brown desecho posterior por las autoridades del hospital.  También, autorizo la revelación de los resultados de las pruebas de muestras y/o los informes escritos al médico tratante sigifredo personal médico del hospital u otros médicos de referencia o consulta involucrados en mi atención, a discreción del patólogo o de mi médico tratante.    Doy consentimiento para que se fotografíen o graben vídeos de las operaciones o procedimientos a realizarse, incluidas las partes de mi cuerpo que ino adecuadas para propósitos médicos, científicos o educativos, en el entendido de que, mi identidad no sea revelada por las fotografías o por textos descriptivos que las acompañen.  Si el procedimiento es fotografiado o grabado en vídeo, el cirujano obtendrá la imagen, cinta de vídeo o CD original.  El hospital no se hará  responsable por el almacenamiento, la revelación o el mantenimiento de la imagen, cinta o CD.    Autorizo la presencia de un especialista de producto u observadores en el quirófano, según lo considere necesario mi médico o las personas que éste designe.     Reconozco que en carlos de que mi procedimiento resulte en un tiempo prolongado de radiografía/fluoroscopia, puedo desarrollar trena reacción en la piel.    Si tengo trena orden de No intentar la reanimación (WARNER), dulce maria estado se suspenderá mientras esté en el quirófano, la nina de procedimientos y kim el periodo de recuperación a menos que yo indique lo contrario explícitamente (o trena persona autorizada a kian el consentimiento en mi nombre). El cirujano o mi médico tratante determinarán cuándo termina el periodo de recuperación aplicable a los efectos de restablecer la orden de WARNER.  Pacientes que se realizan un procedimiento de esterilización: Entiendo que si el procedimiento tiene éxito, los resultados serán permanentes y que, por lo tanto, me será imposible inseminar, concebir o tener hijos.  Asimismo, entiendo que el procedimiento tiene sigifredo propósito la esterilidad, aunque el resultado no está garantizado.   Admito que mi médico me ha explicado la aplicación de sedación/analgesia, incluidos los riesgos y beneficios y, consiento a la administración de sedación/analgesia conforme sea necesario o conveniente a juicio de mi médico.      CERTIFICO QUE HE LEÍDO Y COMPRENDIDO EL CONSENTIMIENTO ANTERIOR PARA LA OPERACIÓN y/o PROCEDIMIENTO.             ______________________________________  _______________________________  Firma del paciente      Firma de la persona responsible  Signature of patient      Signature of responsible person                        ___________________________________                                   Nombre en imprenta de la persona responsible         Name of responsible person          ___________________________________                  Relación con el paciente         Relationship to patient    _________________________________________  ______________ ________________  Firma del testigo          Fecha / Date Hora / Time  Signature of witness    DECLARACÓN DEL MÉDICO Mediante mi firma al calce, afirmo que antes de la hora del procedimiento, he explicadoal paciente y/o a brown representante legal, los riesgos y beneficios involucrados en el tratamiento propuesto, así comocualquier alternativa razonable al tratamiento propuesto. También le(s) he explicado los riesgos y beneficios involucradosen el rechazo del tratarniento propuesto y alternativas al tratamiento propuesto, y he respondido a las preguntas del(la) paciente(My signature below affirms that prior to the time of the procedure, I have explained to the patient and/or his/her guardian, therisks and benefits involved in the proposed treatment and any reasonable alternative to the proposed treatment. I have alsoexplained the risks and benefits involved in refusal of the proposed treatment and have answered the patient's questions.)    ________________________________________   _________________________   _____________   (Firma del médico/Signature of Physician)                                    (Fecha/Date)                                             (Hora/Time)      Patient Name: Page Pritchett     : 1955                 Printed: 2025      Medical Record #: [id number]                                              Page 2 of 2

## (undated) NOTE — LETTER
8/12/2020              Page Pritchett        [id number] [facility] [address]       To Whom it may concern:     This is to certify that Pierre Moran had an appointment on 8/11/2020 with Sammy Posada.  She will need to remain quar